# Patient Record
Sex: MALE | Race: WHITE | NOT HISPANIC OR LATINO | Employment: OTHER | URBAN - METROPOLITAN AREA
[De-identification: names, ages, dates, MRNs, and addresses within clinical notes are randomized per-mention and may not be internally consistent; named-entity substitution may affect disease eponyms.]

---

## 2019-07-09 ENCOUNTER — TRANSCRIBE ORDERS (OUTPATIENT)
Dept: ADMINISTRATIVE | Facility: HOSPITAL | Age: 74
End: 2019-07-09

## 2019-07-09 DIAGNOSIS — M54.16 LUMBAR RADICULOPATHY: Primary | ICD-10-CM

## 2019-07-11 ENCOUNTER — HOSPITAL ENCOUNTER (OUTPATIENT)
Dept: MRI IMAGING | Facility: HOSPITAL | Age: 74
Discharge: HOME | End: 2019-07-11
Admitting: INTERNAL MEDICINE

## 2019-07-11 DIAGNOSIS — M54.16 LUMBAR RADICULOPATHY: ICD-10-CM

## 2019-07-11 PROCEDURE — 72148 MRI LUMBAR SPINE W/O DYE: CPT

## 2020-05-06 ENCOUNTER — TRANSCRIBE ORDERS (OUTPATIENT)
Dept: ADMINISTRATIVE | Facility: HOSPITAL | Age: 75
End: 2020-05-06

## 2020-05-06 DIAGNOSIS — M25.562 LEFT KNEE PAIN, UNSPECIFIED CHRONICITY: ICD-10-CM

## 2020-05-06 DIAGNOSIS — M25.462 KNEE EFFUSION, LEFT: Primary | ICD-10-CM

## 2020-05-18 ENCOUNTER — HOSPITAL ENCOUNTER (OUTPATIENT)
Dept: MRI IMAGING | Facility: HOSPITAL | Age: 75
Discharge: HOME | End: 2020-05-18
Admitting: INTERNAL MEDICINE

## 2020-05-18 DIAGNOSIS — M25.462 KNEE EFFUSION, LEFT: ICD-10-CM

## 2020-05-18 DIAGNOSIS — M25.562 LEFT KNEE PAIN, UNSPECIFIED CHRONICITY: ICD-10-CM

## 2020-05-18 PROCEDURE — 73721 MRI JNT OF LWR EXTRE W/O DYE: CPT

## 2021-01-01 ENCOUNTER — OFFICE VISIT (OUTPATIENT)
Dept: INTERNAL MEDICINE | Facility: CLINIC | Age: 76
End: 2021-01-01

## 2021-01-01 VITALS
SYSTOLIC BLOOD PRESSURE: 132 MMHG | TEMPERATURE: 98.2 F | HEIGHT: 73 IN | WEIGHT: 240.8 LBS | OXYGEN SATURATION: 100 % | BODY MASS INDEX: 31.91 KG/M2 | RESPIRATION RATE: 16 BRPM | HEART RATE: 69 BPM | DIASTOLIC BLOOD PRESSURE: 86 MMHG

## 2021-01-01 DIAGNOSIS — G62.9 PERIPHERAL POLYNEUROPATHY: ICD-10-CM

## 2021-01-01 DIAGNOSIS — M17.0 OSTEOARTHRITIS OF BOTH KNEES, UNSPECIFIED OSTEOARTHRITIS TYPE: Primary | ICD-10-CM

## 2021-01-01 PROCEDURE — 99214 OFFICE O/P EST MOD 30 MIN: CPT | Performed by: INTERNAL MEDICINE

## 2021-01-01 RX ORDER — GABAPENTIN 600 MG/1
TABLET ORAL
Qty: 810 TABLET | Refills: 1 | Status: SHIPPED | OUTPATIENT
Start: 2021-01-01 | End: 2022-01-01 | Stop reason: SDUPTHER

## 2021-03-23 ENCOUNTER — TELEPHONE (OUTPATIENT)
Dept: INTERNAL MEDICINE | Facility: CLINIC | Age: 76
End: 2021-03-23

## 2021-03-23 DIAGNOSIS — G62.9 PERIPHERAL POLYNEUROPATHY: Primary | ICD-10-CM

## 2021-03-23 RX ORDER — GABAPENTIN 600 MG/1
TABLET ORAL
Qty: 810 TABLET | Refills: 1 | Status: SHIPPED | OUTPATIENT
Start: 2021-03-23 | End: 2021-09-24

## 2021-06-21 ENCOUNTER — TELEPHONE (OUTPATIENT)
Dept: INTERNAL MEDICINE | Facility: CLINIC | Age: 76
End: 2021-06-21

## 2021-07-07 ENCOUNTER — OFFICE VISIT (OUTPATIENT)
Dept: INTERNAL MEDICINE | Facility: CLINIC | Age: 76
End: 2021-07-07

## 2021-07-07 VITALS
RESPIRATION RATE: 16 BRPM | TEMPERATURE: 97.5 F | HEART RATE: 63 BPM | BODY MASS INDEX: 31.01 KG/M2 | SYSTOLIC BLOOD PRESSURE: 132 MMHG | DIASTOLIC BLOOD PRESSURE: 72 MMHG | HEIGHT: 73 IN | WEIGHT: 234 LBS | OXYGEN SATURATION: 97 %

## 2021-07-07 DIAGNOSIS — C44.310 FACIAL BASAL CELL CANCER: Primary | ICD-10-CM

## 2021-07-07 PROCEDURE — 99213 OFFICE O/P EST LOW 20 MIN: CPT | Performed by: INTERNAL MEDICINE

## 2021-07-07 NOTE — PROGRESS NOTES
"Chief Complaint  Suspicious Skin Lesion    Subjective          Lionel Whitehead presents to [facility]  History of Present Illness  Nonhealing area on the nose that has been there for months.  He was treated with liquid nitrogen previously and is much smaller than before but is persistent.  Not necessarily painful  Objective   Vital Signs:   /72 (BP Location: Left arm, Patient Position: Sitting, Cuff Size: Large Adult)   Pulse 63   Temp 97.5 °F (36.4 °C) (Temporal)   Resp 16   Ht 185.4 cm (73\")   Wt 106 kg (234 lb)   SpO2 97%   BMI 30.87 kg/m²     Physical Exam   Left nares has a 7 to 8 mm diameter irregular flesh-colored lesion     Result Review :                 Assessment and Plan basal cell cancer clinically.  Referral to Dr. Gordo Ruiz for Mohs surgery.  Follow-up as needed and schedule wellness exam in the next few months  Diagnoses and all orders for this visit:    1. Facial basal cell cancer (Primary)  -     Ambulatory Referral to Dermatology        Follow Up   Return in about 3 months (around 10/7/2021) for Medicare Wellness.  Patient was given instructions and counseling regarding his condition or for health maintenance advice. Please see specific information pulled into the AVS if appropriate.       "

## 2021-09-24 DIAGNOSIS — G62.9 PERIPHERAL POLYNEUROPATHY: ICD-10-CM

## 2021-09-24 RX ORDER — GABAPENTIN 600 MG/1
TABLET ORAL
Qty: 810 TABLET | Refills: 0 | Status: SHIPPED | OUTPATIENT
Start: 2021-09-24 | End: 2021-01-01 | Stop reason: SDUPTHER

## 2021-12-29 NOTE — PROGRESS NOTES
"Chief Complaint  Med Refill (F/U VISIT)    Subjective          Lionel Whitehead presents to [facility]  History of Present Illness  Follow-up for chronic pain management.  No changes in medication.  Denies any sharing of medication or misuse of medication.  Overall pain is pretty well controlled.  Denies any other pain medication use.  Has not gotten any pain medicine from other providers.  Overall things are going well.  He is not quite walking as much.  His knee is doing better but still bothers him somewhat  Objective   Vital Signs:   /86   Pulse 69   Temp 98.2 °F (36.8 °C)   Resp 16   Ht 185.4 cm (73\")   Wt 109 kg (240 lb 12.8 oz)   SpO2 100%   BMI 31.77 kg/m²     Physical Exam  Vitals and nursing note reviewed.   Constitutional:       Appearance: Normal appearance.   HENT:      Head: Normocephalic and atraumatic.   Cardiovascular:      Rate and Rhythm: Normal rate and regular rhythm.   Pulmonary:      Effort: Pulmonary effort is normal.      Breath sounds: Normal breath sounds.   Abdominal:      General: Abdomen is flat.      Palpations: Abdomen is soft.   Musculoskeletal:         General: No swelling or deformity.      Cervical back: Neck supple.      Right lower leg: No edema.      Left lower leg: No edema.   Skin:     General: Skin is warm.      Capillary Refill: Capillary refill takes less than 2 seconds.      Findings: No rash.   Neurological:      General: No focal deficit present.      Mental Status: He is alert and oriented to person, place, and time.      Sensory: Sensory deficit present.   Psychiatric:         Mood and Affect: Mood normal.         Behavior: Behavior normal.        Result Review : Previous notes                Assessment and Plan peripheral neuropathy stable.  He does pretty well overall on the gabapentin.  Obviously it is a pretty large dose but he is been off narcotics for a long time and this works well for " him.  Osteoarthritis of the knees.  He is not interested in a definitive treatment at this point.  He is not interested in any vaccines  Diagnoses and all orders for this visit:    1. Osteoarthritis of both knees, unspecified osteoarthritis type (Primary)    2. Peripheral polyneuropathy  -     gabapentin (NEURONTIN) 600 MG tablet; TAKE TWO TABLETS BY MOUTH THREE TIMES A DAY AND THREE TABLETS AT BEDTIME.  Dispense: 810 tablet; Refill: 1        Follow Up   Return in about 6 months (around 6/27/2022) for Medicare Wellness.  Patient was given instructions and counseling regarding his condition or for health maintenance advice. Please see specific information pulled into the AVS if appropriate.       Answers for HPI/ROS submitted by the patient on 12/25/2021  What is the primary reason for your visit?: Other  Please describe your symptoms.: Prescription refill.  Have you had these symptoms before?: Yes  How long have you been having these symptoms?: Greater than 2 weeks  Please list any medications you are currently taking for this condition.: Gabapentin  Please describe any probable cause for these symptoms. : Age

## 2022-01-01 ENCOUNTER — TELEPHONE (OUTPATIENT)
Dept: INTERNAL MEDICINE | Facility: CLINIC | Age: 77
End: 2022-01-01

## 2022-01-01 ENCOUNTER — TELEPHONE (OUTPATIENT)
Dept: GASTROENTEROLOGY | Facility: CLINIC | Age: 77
End: 2022-01-01

## 2022-01-01 ENCOUNTER — APPOINTMENT (OUTPATIENT)
Dept: GENERAL RADIOLOGY | Facility: HOSPITAL | Age: 77
End: 2022-01-01

## 2022-01-01 ENCOUNTER — ANESTHESIA EVENT (OUTPATIENT)
Dept: PERIOP | Facility: HOSPITAL | Age: 77
End: 2022-01-01

## 2022-01-01 ENCOUNTER — APPOINTMENT (OUTPATIENT)
Dept: CT IMAGING | Facility: HOSPITAL | Age: 77
End: 2022-01-01

## 2022-01-01 ENCOUNTER — APPOINTMENT (OUTPATIENT)
Dept: ULTRASOUND IMAGING | Facility: HOSPITAL | Age: 77
End: 2022-01-01

## 2022-01-01 ENCOUNTER — ANESTHESIA (OUTPATIENT)
Dept: PERIOP | Facility: HOSPITAL | Age: 77
End: 2022-01-01

## 2022-01-01 ENCOUNTER — OFFICE VISIT (OUTPATIENT)
Dept: INTERNAL MEDICINE | Facility: CLINIC | Age: 77
End: 2022-01-01

## 2022-01-01 ENCOUNTER — APPOINTMENT (OUTPATIENT)
Dept: MRI IMAGING | Facility: HOSPITAL | Age: 77
End: 2022-01-01

## 2022-01-01 ENCOUNTER — HOSPITAL ENCOUNTER (INPATIENT)
Facility: HOSPITAL | Age: 77
LOS: 13 days | End: 2022-12-19
Attending: EMERGENCY MEDICINE | Admitting: INTERNAL MEDICINE

## 2022-01-01 VITALS
RESPIRATION RATE: 16 BRPM | SYSTOLIC BLOOD PRESSURE: 132 MMHG | HEART RATE: 92 BPM | DIASTOLIC BLOOD PRESSURE: 62 MMHG | OXYGEN SATURATION: 96 % | HEIGHT: 73 IN | BODY MASS INDEX: 30.62 KG/M2 | TEMPERATURE: 97.3 F | WEIGHT: 231 LBS

## 2022-01-01 VITALS
RESPIRATION RATE: 18 BRPM | OXYGEN SATURATION: 98 % | DIASTOLIC BLOOD PRESSURE: 76 MMHG | TEMPERATURE: 98.6 F | HEART RATE: 84 BPM | HEIGHT: 73 IN | WEIGHT: 247 LBS | SYSTOLIC BLOOD PRESSURE: 134 MMHG | BODY MASS INDEX: 32.74 KG/M2

## 2022-01-01 VITALS
HEIGHT: 73 IN | OXYGEN SATURATION: 97 % | WEIGHT: 220 LBS | TEMPERATURE: 98.8 F | HEART RATE: 87 BPM | RESPIRATION RATE: 18 BRPM | BODY MASS INDEX: 29.16 KG/M2

## 2022-01-01 VITALS
DIASTOLIC BLOOD PRESSURE: 50 MMHG | SYSTOLIC BLOOD PRESSURE: 92 MMHG | WEIGHT: 227.74 LBS | HEART RATE: 44 BPM | HEIGHT: 73 IN | OXYGEN SATURATION: 90 % | TEMPERATURE: 96.8 F | BODY MASS INDEX: 30.18 KG/M2 | RESPIRATION RATE: 26 BRPM

## 2022-01-01 DIAGNOSIS — G89.29 CHRONIC RIGHT SHOULDER PAIN: Primary | ICD-10-CM

## 2022-01-01 DIAGNOSIS — M25.562 CHRONIC PAIN OF LEFT KNEE: ICD-10-CM

## 2022-01-01 DIAGNOSIS — R03.0 ELEVATED BLOOD PRESSURE READING: Primary | ICD-10-CM

## 2022-01-01 DIAGNOSIS — G62.9 PERIPHERAL POLYNEUROPATHY: ICD-10-CM

## 2022-01-01 DIAGNOSIS — G89.29 CHRONIC PAIN OF LEFT KNEE: ICD-10-CM

## 2022-01-01 DIAGNOSIS — M25.511 CHRONIC RIGHT SHOULDER PAIN: Primary | ICD-10-CM

## 2022-01-01 DIAGNOSIS — K92.2 UPPER GI BLEED: Primary | ICD-10-CM

## 2022-01-01 DIAGNOSIS — R73.01 ELEVATED FASTING GLUCOSE: ICD-10-CM

## 2022-01-01 DIAGNOSIS — E78.5 HYPERLIPIDEMIA, UNSPECIFIED HYPERLIPIDEMIA TYPE: ICD-10-CM

## 2022-01-01 DIAGNOSIS — B34.9 VIRAL INFECTION: Primary | ICD-10-CM

## 2022-01-01 LAB
ABO GROUP BLD: NORMAL
ABO GROUP BLD: NORMAL
ALBUMIN FLD-MCNC: <0.2 G/DL
ALBUMIN SERPL-MCNC: 2.5 G/DL (ref 3.5–5.2)
ALBUMIN SERPL-MCNC: 2.6 G/DL (ref 3.5–5.2)
ALBUMIN SERPL-MCNC: 2.6 G/DL (ref 3.5–5.2)
ALBUMIN SERPL-MCNC: 2.7 G/DL (ref 3.5–5.2)
ALBUMIN SERPL-MCNC: 2.8 G/DL (ref 3.5–5.2)
ALBUMIN SERPL-MCNC: 2.9 G/DL (ref 3.5–5.2)
ALBUMIN SERPL-MCNC: 2.9 G/DL (ref 3.5–5.2)
ALBUMIN SERPL-MCNC: 3.1 G/DL (ref 3.5–5.2)
ALBUMIN SERPL-MCNC: 4.4 G/DL (ref 3.7–4.7)
ALBUMIN/GLOB SERPL: 0.8 G/DL
ALBUMIN/GLOB SERPL: 0.9 G/DL
ALBUMIN/GLOB SERPL: 0.9 G/DL
ALBUMIN/GLOB SERPL: 1.1 G/DL
ALBUMIN/GLOB SERPL: 1.2 G/DL
ALBUMIN/GLOB SERPL: 1.3 G/DL
ALBUMIN/GLOB SERPL: 1.8 {RATIO} (ref 1.2–2.2)
ALP SERPL-CCNC: 109 U/L (ref 39–117)
ALP SERPL-CCNC: 116 IU/L (ref 44–121)
ALP SERPL-CCNC: 223 U/L (ref 39–117)
ALP SERPL-CCNC: 251 U/L (ref 39–117)
ALP SERPL-CCNC: 262 U/L (ref 39–117)
ALP SERPL-CCNC: 267 U/L (ref 39–117)
ALP SERPL-CCNC: 275 U/L (ref 39–117)
ALP SERPL-CCNC: 282 U/L (ref 39–117)
ALP SERPL-CCNC: 323 U/L (ref 39–117)
ALP SERPL-CCNC: 360 U/L (ref 39–117)
ALP SERPL-CCNC: 397 U/L (ref 39–117)
ALPHA-FETOPROTEIN: 650 NG/ML (ref 0–8.3)
ALT SERPL W P-5'-P-CCNC: 137 U/L (ref 1–41)
ALT SERPL W P-5'-P-CCNC: 1480 U/L (ref 1–41)
ALT SERPL W P-5'-P-CCNC: 204 U/L (ref 1–41)
ALT SERPL W P-5'-P-CCNC: 25 U/L (ref 1–41)
ALT SERPL W P-5'-P-CCNC: 2610 U/L (ref 1–41)
ALT SERPL W P-5'-P-CCNC: 327 U/L (ref 1–41)
ALT SERPL W P-5'-P-CCNC: 3613 U/L (ref 1–41)
ALT SERPL W P-5'-P-CCNC: 419 U/L (ref 1–41)
ALT SERPL W P-5'-P-CCNC: 592 U/L (ref 1–41)
ALT SERPL W P-5'-P-CCNC: 943 U/L (ref 1–41)
ALT SERPL-CCNC: 18 IU/L (ref 0–44)
AMMONIA BLD-SCNC: 126 UMOL/L (ref 16–60)
AMMONIA BLD-SCNC: 142 UMOL/L (ref 16–60)
AMMONIA BLD-SCNC: 15 UMOL/L (ref 16–60)
AMMONIA BLD-SCNC: 174 UMOL/L (ref 16–60)
AMPHETAMINES UR QL SCN: NEGATIVE NG/ML
ANION GAP SERPL CALCULATED.3IONS-SCNC: 10.7 MMOL/L (ref 5–15)
ANION GAP SERPL CALCULATED.3IONS-SCNC: 10.9 MMOL/L (ref 5–15)
ANION GAP SERPL CALCULATED.3IONS-SCNC: 11.3 MMOL/L (ref 5–15)
ANION GAP SERPL CALCULATED.3IONS-SCNC: 11.3 MMOL/L (ref 5–15)
ANION GAP SERPL CALCULATED.3IONS-SCNC: 11.4 MMOL/L (ref 5–15)
ANION GAP SERPL CALCULATED.3IONS-SCNC: 11.4 MMOL/L (ref 5–15)
ANION GAP SERPL CALCULATED.3IONS-SCNC: 12.3 MMOL/L (ref 5–15)
ANION GAP SERPL CALCULATED.3IONS-SCNC: 12.7 MMOL/L (ref 5–15)
ANION GAP SERPL CALCULATED.3IONS-SCNC: 13 MMOL/L (ref 5–15)
ANION GAP SERPL CALCULATED.3IONS-SCNC: 14 MMOL/L (ref 5–15)
ANION GAP SERPL CALCULATED.3IONS-SCNC: 14.7 MMOL/L (ref 5–15)
ANION GAP SERPL CALCULATED.3IONS-SCNC: 15.6 MMOL/L (ref 5–15)
ANION GAP SERPL CALCULATED.3IONS-SCNC: 16.7 MMOL/L (ref 5–15)
ANION GAP SERPL CALCULATED.3IONS-SCNC: 9.5 MMOL/L (ref 5–15)
ANION GAP SERPL CALCULATED.3IONS-SCNC: 9.6 MMOL/L (ref 5–15)
ANION GAP SERPL CALCULATED.3IONS-SCNC: 9.9 MMOL/L (ref 5–15)
ANISOCYTOSIS BLD QL: NORMAL
APPEARANCE FLD: CLEAR
APTT PPP: 26.3 SECONDS (ref 24.3–38.1)
APTT PPP: 27.4 SECONDS (ref 24.3–38.1)
ARTERIAL PATENCY WRIST A: ABNORMAL
ARTERIAL PATENCY WRIST A: ABNORMAL
AST SERPL-CCNC: 13 IU/L (ref 0–40)
AST SERPL-CCNC: 18 U/L (ref 1–40)
AST SERPL-CCNC: 24 U/L (ref 1–40)
AST SERPL-CCNC: 25 U/L (ref 1–40)
AST SERPL-CCNC: 25 U/L (ref 1–40)
AST SERPL-CCNC: 31 U/L (ref 1–40)
AST SERPL-CCNC: 362 U/L (ref 1–40)
AST SERPL-CCNC: 37 U/L (ref 1–40)
AST SERPL-CCNC: 52 U/L (ref 1–40)
AST SERPL-CCNC: 87 U/L (ref 1–40)
AST SERPL-CCNC: 889 U/L (ref 1–40)
ATMOSPHERIC PRESS: 741 MMHG
ATMOSPHERIC PRESS: 747 MMHG
BACTERIA FLD CULT: NORMAL
BACTERIA SPEC AEROBE CULT: NORMAL
BACTERIA UR QL AUTO: ABNORMAL /HPF
BACTERIA UR QL AUTO: ABNORMAL /HPF
BARBITURATES UR QL SCN: NEGATIVE NG/ML
BASE EXCESS BLDA CALC-SCNC: -2.4 MMOL/L (ref 0–2)
BASE EXCESS BLDA CALC-SCNC: -8.6 MMOL/L (ref 0–2)
BASOPHILS # BLD AUTO: 0 10*3/MM3 (ref 0–0.2)
BASOPHILS # BLD AUTO: 0.01 10*3/MM3 (ref 0–0.2)
BASOPHILS # BLD AUTO: 0.03 10*3/MM3 (ref 0–0.2)
BASOPHILS # BLD AUTO: 0.03 10*3/MM3 (ref 0–0.2)
BASOPHILS # BLD AUTO: 0.04 10*3/MM3 (ref 0–0.2)
BASOPHILS # BLD AUTO: 0.04 10*3/MM3 (ref 0–0.2)
BASOPHILS # BLD AUTO: 0.05 10*3/MM3 (ref 0–0.2)
BASOPHILS NFR BLD AUTO: 0 % (ref 0–1.5)
BASOPHILS NFR BLD AUTO: 0.1 % (ref 0–1.5)
BASOPHILS NFR BLD AUTO: 0.2 % (ref 0–1.5)
BASOPHILS NFR BLD AUTO: 0.3 % (ref 0–1.5)
BASOPHILS NFR BLD AUTO: 0.4 % (ref 0–1.5)
BDY SITE: ABNORMAL
BDY SITE: ABNORMAL
BENZODIAZ UR QL SCN: NEGATIVE NG/ML
BH BB BLOOD EXPIRATION DATE: NORMAL
BH BB BLOOD TYPE BARCODE: 600
BH BB BLOOD TYPE BARCODE: 6200
BH BB BLOOD TYPE BARCODE: 6200
BH BB DISPENSE STATUS: NORMAL
BH BB PRODUCT CODE: NORMAL
BH BB UNIT NUMBER: NORMAL
BILIRUB SERPL-MCNC: 0.4 MG/DL (ref 0–1.2)
BILIRUB SERPL-MCNC: 0.6 MG/DL (ref 0–1.2)
BILIRUB SERPL-MCNC: 1.5 MG/DL (ref 0–1.2)
BILIRUB SERPL-MCNC: 1.6 MG/DL (ref 0–1.2)
BILIRUB SERPL-MCNC: 1.7 MG/DL (ref 0–1.2)
BILIRUB SERPL-MCNC: 1.8 MG/DL (ref 0–1.2)
BILIRUB SERPL-MCNC: 1.8 MG/DL (ref 0–1.2)
BILIRUB SERPL-MCNC: 2.5 MG/DL (ref 0–1.2)
BILIRUB SERPL-MCNC: 2.6 MG/DL (ref 0–1.2)
BILIRUB SERPL-MCNC: 2.7 MG/DL (ref 0–1.2)
BILIRUB SERPL-MCNC: 3.1 MG/DL (ref 0–1.2)
BILIRUB UR QL STRIP: NEGATIVE
BILIRUB UR QL STRIP: NEGATIVE
BLD GP AB SCN SERPL QL: NEGATIVE
BODY TEMPERATURE: 37 C
BODY TEMPERATURE: 37 C
BUN SERPL-MCNC: 43 MG/DL (ref 8–23)
BUN SERPL-MCNC: 43 MG/DL (ref 8–23)
BUN SERPL-MCNC: 45 MG/DL (ref 8–23)
BUN SERPL-MCNC: 47 MG/DL (ref 8–23)
BUN SERPL-MCNC: 47 MG/DL (ref 8–23)
BUN SERPL-MCNC: 48 MG/DL (ref 8–23)
BUN SERPL-MCNC: 48 MG/DL (ref 8–23)
BUN SERPL-MCNC: 53 MG/DL (ref 8–23)
BUN SERPL-MCNC: 53 MG/DL (ref 8–23)
BUN SERPL-MCNC: 60 MG/DL (ref 8–23)
BUN SERPL-MCNC: 65 MG/DL (ref 8–23)
BUN SERPL-MCNC: 68 MG/DL (ref 8–23)
BUN SERPL-MCNC: 74 MG/DL (ref 8–23)
BUN SERPL-MCNC: 8 MG/DL (ref 8–27)
BUN SERPL-MCNC: 82 MG/DL (ref 8–23)
BUN SERPL-MCNC: 85 MG/DL (ref 8–23)
BUN SERPL-MCNC: 89 MG/DL (ref 8–23)
BUN/CREAT SERPL: 38.7 (ref 7–25)
BUN/CREAT SERPL: 39.2 (ref 7–25)
BUN/CREAT SERPL: 39.4 (ref 7–25)
BUN/CREAT SERPL: 41.3 (ref 7–25)
BUN/CREAT SERPL: 42 (ref 7–25)
BUN/CREAT SERPL: 42.5 (ref 7–25)
BUN/CREAT SERPL: 42.9 (ref 7–25)
BUN/CREAT SERPL: 43.8 (ref 7–25)
BUN/CREAT SERPL: 44.3 (ref 7–25)
BUN/CREAT SERPL: 47.1 (ref 7–25)
BUN/CREAT SERPL: 48 (ref 7–25)
BUN/CREAT SERPL: 48 (ref 7–25)
BUN/CREAT SERPL: 48.6 (ref 7–25)
BUN/CREAT SERPL: 54.6 (ref 7–25)
BUN/CREAT SERPL: 56.7 (ref 7–25)
BUN/CREAT SERPL: 59.1 (ref 7–25)
BUN/CREAT SERPL: 8 (ref 10–24)
BZE UR QL SCN: NEGATIVE NG/ML
CALCIUM SERPL-MCNC: 9.5 MG/DL (ref 8.6–10.2)
CALCIUM SPEC-SCNC: 7.7 MG/DL (ref 8.6–10.5)
CALCIUM SPEC-SCNC: 7.9 MG/DL (ref 8.6–10.5)
CALCIUM SPEC-SCNC: 8 MG/DL (ref 8.6–10.5)
CALCIUM SPEC-SCNC: 8 MG/DL (ref 8.6–10.5)
CALCIUM SPEC-SCNC: 8.1 MG/DL (ref 8.6–10.5)
CALCIUM SPEC-SCNC: 8.3 MG/DL (ref 8.6–10.5)
CALCIUM SPEC-SCNC: 8.4 MG/DL (ref 8.6–10.5)
CALCIUM SPEC-SCNC: 8.5 MG/DL (ref 8.6–10.5)
CALCIUM SPEC-SCNC: 8.6 MG/DL (ref 8.6–10.5)
CALCIUM SPEC-SCNC: 8.7 MG/DL (ref 8.6–10.5)
CANNABINOIDS UR QL SCN: NEGATIVE NG/ML
CHLORIDE SERPL-SCNC: 103 MMOL/L (ref 98–107)
CHLORIDE SERPL-SCNC: 105 MMOL/L (ref 96–106)
CHLORIDE SERPL-SCNC: 105 MMOL/L (ref 98–107)
CHLORIDE SERPL-SCNC: 105 MMOL/L (ref 98–107)
CHLORIDE SERPL-SCNC: 106 MMOL/L (ref 98–107)
CHLORIDE SERPL-SCNC: 109 MMOL/L (ref 98–107)
CHLORIDE SERPL-SCNC: 112 MMOL/L (ref 98–107)
CHLORIDE SERPL-SCNC: 113 MMOL/L (ref 98–107)
CHLORIDE SERPL-SCNC: 118 MMOL/L (ref 98–107)
CHLORIDE SERPL-SCNC: 119 MMOL/L (ref 98–107)
CHLORIDE SERPL-SCNC: 120 MMOL/L (ref 98–107)
CHLORIDE SERPL-SCNC: 123 MMOL/L (ref 98–107)
CHLORIDE SERPL-SCNC: 123 MMOL/L (ref 98–107)
CHLORIDE SERPL-SCNC: 124 MMOL/L (ref 98–107)
CHLORIDE SERPL-SCNC: 124 MMOL/L (ref 98–107)
CHLORIDE SERPL-SCNC: 127 MMOL/L (ref 98–107)
CHLORIDE SERPL-SCNC: 127 MMOL/L (ref 98–107)
CHOLEST SERPL-MCNC: 175 MG/DL (ref 100–199)
CHOLEST/HDLC SERPL: 5 RATIO (ref 0–5)
CLARITY UR: CLEAR
CLARITY UR: CLEAR
CO2 SERPL-SCNC: 14.3 MMOL/L (ref 22–29)
CO2 SERPL-SCNC: 15.3 MMOL/L (ref 22–29)
CO2 SERPL-SCNC: 15.4 MMOL/L (ref 22–29)
CO2 SERPL-SCNC: 16.3 MMOL/L (ref 22–29)
CO2 SERPL-SCNC: 17 MMOL/L (ref 22–29)
CO2 SERPL-SCNC: 19 MMOL/L (ref 22–29)
CO2 SERPL-SCNC: 19.1 MMOL/L (ref 22–29)
CO2 SERPL-SCNC: 19.1 MMOL/L (ref 22–29)
CO2 SERPL-SCNC: 19.4 MMOL/L (ref 22–29)
CO2 SERPL-SCNC: 19.6 MMOL/L (ref 22–29)
CO2 SERPL-SCNC: 19.7 MMOL/L (ref 22–29)
CO2 SERPL-SCNC: 20.3 MMOL/L (ref 22–29)
CO2 SERPL-SCNC: 21 MMOL/L (ref 20–29)
CO2 SERPL-SCNC: 21.5 MMOL/L (ref 22–29)
CO2 SERPL-SCNC: 21.6 MMOL/L (ref 22–29)
COLOR FLD: COLORLESS
COLOR UR: ABNORMAL
COLOR UR: YELLOW
CREAT SERPL-MCNC: 0.97 MG/DL (ref 0.76–1.27)
CREAT SERPL-MCNC: 1 MG/DL (ref 0.76–1.27)
CREAT SERPL-MCNC: 1.05 MG/DL (ref 0.76–1.27)
CREAT SERPL-MCNC: 1.09 MG/DL (ref 0.76–1.27)
CREAT SERPL-MCNC: 1.09 MG/DL (ref 0.76–1.27)
CREAT SERPL-MCNC: 1.12 MG/DL (ref 0.76–1.27)
CREAT SERPL-MCNC: 1.12 MG/DL (ref 0.76–1.27)
CREAT SERPL-MCNC: 1.15 MG/DL (ref 0.76–1.27)
CREAT SERPL-MCNC: 1.19 MG/DL (ref 0.76–1.27)
CREAT SERPL-MCNC: 1.2 MG/DL (ref 0.76–1.27)
CREAT SERPL-MCNC: 1.21 MG/DL (ref 0.76–1.27)
CREAT SERPL-MCNC: 1.25 MG/DL (ref 0.76–1.27)
CREAT SERPL-MCNC: 1.37 MG/DL (ref 0.76–1.27)
CREAT SERPL-MCNC: 1.5 MG/DL (ref 0.76–1.27)
CREAT SERPL-MCNC: 1.57 MG/DL (ref 0.76–1.27)
CREAT SERPL-MCNC: 1.83 MG/DL (ref 0.76–1.27)
CREAT SERPL-MCNC: 1.93 MG/DL (ref 0.76–1.27)
CREAT UR-MCNC: 66.8 MG/DL (ref 20–300)
CROSSMATCH INTERPRETATION: NORMAL
CRP SERPL-MCNC: 15.16 MG/DL (ref 0–0.5)
CYTO UR: NORMAL
D-LACTATE SERPL-SCNC: 2.5 MMOL/L (ref 0.5–2)
D-LACTATE SERPL-SCNC: 3.5 MMOL/L (ref 0.5–2)
D-LACTATE SERPL-SCNC: 3.6 MMOL/L (ref 0.5–2)
D-LACTATE SERPL-SCNC: 3.9 MMOL/L (ref 0.5–2)
D-LACTATE SERPL-SCNC: 4.1 MMOL/L (ref 0.5–2)
D-LACTATE SERPL-SCNC: 5.2 MMOL/L (ref 0.5–2)
D-LACTATE SERPL-SCNC: 5.4 MMOL/L (ref 0.5–2)
D-LACTATE SERPL-SCNC: 6.2 MMOL/L (ref 0.5–2)
DEPRECATED RDW RBC AUTO: 54.8 FL (ref 37–54)
DEPRECATED RDW RBC AUTO: 54.8 FL (ref 37–54)
DEPRECATED RDW RBC AUTO: 58.4 FL (ref 37–54)
DEPRECATED RDW RBC AUTO: 65.3 FL (ref 37–54)
DEPRECATED RDW RBC AUTO: 65.6 FL (ref 37–54)
DEPRECATED RDW RBC AUTO: 66.1 FL (ref 37–54)
DEPRECATED RDW RBC AUTO: 66.3 FL (ref 37–54)
DEPRECATED RDW RBC AUTO: 66.4 FL (ref 37–54)
DEPRECATED RDW RBC AUTO: 71.5 FL (ref 37–54)
EGFRCR SERPLBLD CKD-EPI 2021: 35.7 ML/MIN/1.73
EGFRCR SERPLBLD CKD-EPI 2021: 38 ML/MIN/1.73
EGFRCR SERPLBLD CKD-EPI 2021: 45.7 ML/MIN/1.73
EGFRCR SERPLBLD CKD-EPI 2021: 48.2 ML/MIN/1.73
EGFRCR SERPLBLD CKD-EPI 2021: 53.8 ML/MIN/1.73
EGFRCR SERPLBLD CKD-EPI 2021: 60 ML/MIN/1.73
EGFRCR SERPLBLD CKD-EPI 2021: 62.4 ML/MIN/1.73
EGFRCR SERPLBLD CKD-EPI 2021: 63.1 ML/MIN/1.73
EGFRCR SERPLBLD CKD-EPI 2021: 63.7 ML/MIN/1.73
EGFRCR SERPLBLD CKD-EPI 2021: 66.4 ML/MIN/1.73
EGFRCR SERPLBLD CKD-EPI 2021: 68.5 ML/MIN/1.73
EGFRCR SERPLBLD CKD-EPI 2021: 68.5 ML/MIN/1.73
EGFRCR SERPLBLD CKD-EPI 2021: 70.8 ML/MIN/1.73
EGFRCR SERPLBLD CKD-EPI 2021: 70.8 ML/MIN/1.73
EGFRCR SERPLBLD CKD-EPI 2021: 74 ML/MIN/1.73
EGFRCR SERPLBLD CKD-EPI 2021: 78.5 ML/MIN/1.73
EGFRCR SERPLBLD CKD-EPI 2021: 81.4 ML/MIN/1.73
EOSINOPHIL # BLD AUTO: 0 10*3/MM3 (ref 0–0.4)
EOSINOPHIL # BLD AUTO: 0.01 10*3/MM3 (ref 0–0.4)
EOSINOPHIL # BLD AUTO: 0.02 10*3/MM3 (ref 0–0.4)
EOSINOPHIL # BLD AUTO: 0.09 10*3/MM3 (ref 0–0.4)
EOSINOPHIL # BLD AUTO: 0.11 10*3/MM3 (ref 0–0.4)
EOSINOPHIL # BLD AUTO: 0.18 10*3/MM3 (ref 0–0.4)
EOSINOPHIL # BLD AUTO: 0.18 10*3/MM3 (ref 0–0.4)
EOSINOPHIL NFR BLD AUTO: 0 % (ref 0.3–6.2)
EOSINOPHIL NFR BLD AUTO: 0.1 % (ref 0.3–6.2)
EOSINOPHIL NFR BLD AUTO: 0.3 % (ref 0.3–6.2)
EOSINOPHIL NFR BLD AUTO: 0.6 % (ref 0.3–6.2)
EOSINOPHIL NFR BLD AUTO: 1.2 % (ref 0.3–6.2)
EOSINOPHIL NFR BLD AUTO: 1.4 % (ref 0.3–6.2)
EOSINOPHIL NFR BLD AUTO: 1.5 % (ref 0.3–6.2)
ERYTHROCYTE [DISTWIDTH] IN BLOOD BY AUTOMATED COUNT: 15.5 % (ref 12.3–15.4)
ERYTHROCYTE [DISTWIDTH] IN BLOOD BY AUTOMATED COUNT: 15.7 % (ref 12.3–15.4)
ERYTHROCYTE [DISTWIDTH] IN BLOOD BY AUTOMATED COUNT: 16.8 % (ref 12.3–15.4)
ERYTHROCYTE [DISTWIDTH] IN BLOOD BY AUTOMATED COUNT: 17.9 % (ref 12.3–15.4)
ERYTHROCYTE [DISTWIDTH] IN BLOOD BY AUTOMATED COUNT: 17.9 % (ref 12.3–15.4)
ERYTHROCYTE [DISTWIDTH] IN BLOOD BY AUTOMATED COUNT: 18.2 % (ref 12.3–15.4)
ERYTHROCYTE [DISTWIDTH] IN BLOOD BY AUTOMATED COUNT: 18.3 % (ref 12.3–15.4)
ERYTHROCYTE [DISTWIDTH] IN BLOOD BY AUTOMATED COUNT: 18.4 % (ref 12.3–15.4)
ERYTHROCYTE [DISTWIDTH] IN BLOOD BY AUTOMATED COUNT: 18.6 % (ref 12.3–15.4)
EXPIRATION DATE: NORMAL
FLUAV AG NPH QL: NEGATIVE
FLUAV RNA RESP QL NAA+PROBE: NOT DETECTED
FLUBV AG NPH QL: NEGATIVE
FLUBV RNA RESP QL NAA+PROBE: NOT DETECTED
GLOBULIN SER CALC-MCNC: 2.5 G/DL (ref 1.5–4.5)
GLOBULIN UR ELPH-MCNC: 2.1 GM/DL
GLOBULIN UR ELPH-MCNC: 2.1 GM/DL
GLOBULIN UR ELPH-MCNC: 2.2 GM/DL
GLOBULIN UR ELPH-MCNC: 2.3 GM/DL
GLOBULIN UR ELPH-MCNC: 2.3 GM/DL
GLOBULIN UR ELPH-MCNC: 2.4 GM/DL
GLOBULIN UR ELPH-MCNC: 2.5 GM/DL
GLOBULIN UR ELPH-MCNC: 2.8 GM/DL
GLOBULIN UR ELPH-MCNC: 3 GM/DL
GLOBULIN UR ELPH-MCNC: 3.2 GM/DL
GLUCOSE BLDC GLUCOMTR-MCNC: 127 MG/DL (ref 70–130)
GLUCOSE BLDC GLUCOMTR-MCNC: 135 MG/DL (ref 70–130)
GLUCOSE BLDC GLUCOMTR-MCNC: 139 MG/DL (ref 70–130)
GLUCOSE BLDC GLUCOMTR-MCNC: 155 MG/DL (ref 70–130)
GLUCOSE BLDC GLUCOMTR-MCNC: 155 MG/DL (ref 70–130)
GLUCOSE BLDC GLUCOMTR-MCNC: 156 MG/DL (ref 70–130)
GLUCOSE BLDC GLUCOMTR-MCNC: 159 MG/DL (ref 70–130)
GLUCOSE BLDC GLUCOMTR-MCNC: 163 MG/DL (ref 70–130)
GLUCOSE BLDC GLUCOMTR-MCNC: 164 MG/DL (ref 70–130)
GLUCOSE BLDC GLUCOMTR-MCNC: 165 MG/DL (ref 70–130)
GLUCOSE BLDC GLUCOMTR-MCNC: 167 MG/DL (ref 70–130)
GLUCOSE BLDC GLUCOMTR-MCNC: 168 MG/DL (ref 70–130)
GLUCOSE BLDC GLUCOMTR-MCNC: 169 MG/DL (ref 70–130)
GLUCOSE BLDC GLUCOMTR-MCNC: 174 MG/DL (ref 70–130)
GLUCOSE BLDC GLUCOMTR-MCNC: 177 MG/DL (ref 70–130)
GLUCOSE BLDC GLUCOMTR-MCNC: 180 MG/DL (ref 70–130)
GLUCOSE BLDC GLUCOMTR-MCNC: 181 MG/DL (ref 70–130)
GLUCOSE BLDC GLUCOMTR-MCNC: 185 MG/DL (ref 70–130)
GLUCOSE BLDC GLUCOMTR-MCNC: 187 MG/DL (ref 70–130)
GLUCOSE BLDC GLUCOMTR-MCNC: 188 MG/DL (ref 70–130)
GLUCOSE BLDC GLUCOMTR-MCNC: 190 MG/DL (ref 70–130)
GLUCOSE BLDC GLUCOMTR-MCNC: 197 MG/DL (ref 70–130)
GLUCOSE BLDC GLUCOMTR-MCNC: 198 MG/DL (ref 70–130)
GLUCOSE BLDC GLUCOMTR-MCNC: 203 MG/DL (ref 70–130)
GLUCOSE BLDC GLUCOMTR-MCNC: 212 MG/DL (ref 70–130)
GLUCOSE BLDC GLUCOMTR-MCNC: 228 MG/DL (ref 70–130)
GLUCOSE BLDC GLUCOMTR-MCNC: 237 MG/DL (ref 70–130)
GLUCOSE SERPL-MCNC: 116 MG/DL (ref 65–99)
GLUCOSE SERPL-MCNC: 136 MG/DL (ref 65–99)
GLUCOSE SERPL-MCNC: 136 MG/DL (ref 65–99)
GLUCOSE SERPL-MCNC: 137 MG/DL (ref 65–99)
GLUCOSE SERPL-MCNC: 144 MG/DL (ref 65–99)
GLUCOSE SERPL-MCNC: 144 MG/DL (ref 65–99)
GLUCOSE SERPL-MCNC: 145 MG/DL (ref 65–99)
GLUCOSE SERPL-MCNC: 149 MG/DL (ref 65–99)
GLUCOSE SERPL-MCNC: 153 MG/DL (ref 65–99)
GLUCOSE SERPL-MCNC: 155 MG/DL (ref 65–99)
GLUCOSE SERPL-MCNC: 157 MG/DL (ref 65–99)
GLUCOSE SERPL-MCNC: 170 MG/DL (ref 65–99)
GLUCOSE SERPL-MCNC: 171 MG/DL (ref 65–99)
GLUCOSE SERPL-MCNC: 172 MG/DL (ref 65–99)
GLUCOSE SERPL-MCNC: 174 MG/DL (ref 65–99)
GLUCOSE SERPL-MCNC: 187 MG/DL (ref 65–99)
GLUCOSE SERPL-MCNC: 192 MG/DL (ref 65–99)
GLUCOSE UR STRIP-MCNC: NEGATIVE MG/DL
GLUCOSE UR STRIP-MCNC: NEGATIVE MG/DL
GRAM STN SPEC: NORMAL
GRAM STN SPEC: NORMAL
HBA1C MFR BLD: 6.1 % (ref 4.8–5.6)
HCO3 BLDA-SCNC: 15.2 MMOL/L (ref 20–26)
HCO3 BLDA-SCNC: 20.8 MMOL/L (ref 20–26)
HCT VFR BLD AUTO: 20.2 % (ref 37.5–51)
HCT VFR BLD AUTO: 23.6 % (ref 37.5–51)
HCT VFR BLD AUTO: 23.6 % (ref 37.5–51)
HCT VFR BLD AUTO: 25.5 % (ref 37.5–51)
HCT VFR BLD AUTO: 25.6 % (ref 37.5–51)
HCT VFR BLD AUTO: 25.9 % (ref 37.5–51)
HCT VFR BLD AUTO: 26.4 % (ref 37.5–51)
HCT VFR BLD AUTO: 26.4 % (ref 37.5–51)
HCT VFR BLD AUTO: 27.2 % (ref 37.5–51)
HCT VFR BLD AUTO: 27.3 % (ref 37.5–51)
HCT VFR BLD AUTO: 27.3 % (ref 37.5–51)
HCT VFR BLD AUTO: 27.4 % (ref 37.5–51)
HCT VFR BLD AUTO: 27.9 % (ref 37.5–51)
HCT VFR BLD AUTO: 28.1 % (ref 37.5–51)
HCT VFR BLD AUTO: 28.1 % (ref 37.5–51)
HCT VFR BLD AUTO: 29 % (ref 37.5–51)
HCT VFR BLD AUTO: 34.7 % (ref 37.5–51)
HCT VFR BLD AUTO: 35.6 % (ref 37.5–51)
HCT VFR BLD AUTO: 35.9 % (ref 37.5–51)
HDLC SERPL-MCNC: 35 MG/DL
HGB BLD-MCNC: 10.2 G/DL (ref 13–17.7)
HGB BLD-MCNC: 10.4 G/DL (ref 13–17.7)
HGB BLD-MCNC: 10.4 G/DL (ref 13–17.7)
HGB BLD-MCNC: 6.5 G/DL (ref 13–17.7)
HGB BLD-MCNC: 7.7 G/DL (ref 13–17.7)
HGB BLD-MCNC: 7.7 G/DL (ref 13–17.7)
HGB BLD-MCNC: 7.9 G/DL (ref 13–17.7)
HGB BLD-MCNC: 8 G/DL (ref 13–17.7)
HGB BLD-MCNC: 8.2 G/DL (ref 13–17.7)
HGB BLD-MCNC: 8.4 G/DL (ref 13–17.7)
HGB BLD-MCNC: 8.5 G/DL (ref 13–17.7)
HGB BLD-MCNC: 8.5 G/DL (ref 13–17.7)
HGB BLD-MCNC: 8.6 G/DL (ref 13–17.7)
HGB BLD-MCNC: 8.7 G/DL (ref 13–17.7)
HGB BLD-MCNC: 8.8 G/DL (ref 13–17.7)
HGB BLD-MCNC: 9 G/DL (ref 13–17.7)
HGB BLD-MCNC: 9.1 G/DL (ref 13–17.7)
HGB BLD-MCNC: 9.2 G/DL (ref 13–17.7)
HGB BLD-MCNC: 9.2 G/DL (ref 13–17.7)
HGB BLDA-MCNC: 8.2 G/DL (ref 14–18)
HGB BLDA-MCNC: 9.1 G/DL (ref 14–18)
HGB UR QL STRIP.AUTO: ABNORMAL
HGB UR QL STRIP.AUTO: ABNORMAL
HYALINE CASTS UR QL AUTO: ABNORMAL /LPF
HYALINE CASTS UR QL AUTO: ABNORMAL /LPF
IMM GRANULOCYTES # BLD AUTO: 0.1 10*3/MM3 (ref 0–0.05)
IMM GRANULOCYTES # BLD AUTO: 0.1 10*3/MM3 (ref 0–0.05)
IMM GRANULOCYTES # BLD AUTO: 0.15 10*3/MM3 (ref 0–0.05)
IMM GRANULOCYTES # BLD AUTO: 0.2 10*3/MM3 (ref 0–0.05)
IMM GRANULOCYTES # BLD AUTO: 0.24 10*3/MM3 (ref 0–0.05)
IMM GRANULOCYTES # BLD AUTO: 0.27 10*3/MM3 (ref 0–0.05)
IMM GRANULOCYTES # BLD AUTO: 0.35 10*3/MM3 (ref 0–0.05)
IMM GRANULOCYTES # BLD AUTO: 0.36 10*3/MM3 (ref 0–0.05)
IMM GRANULOCYTES # BLD AUTO: 0.49 10*3/MM3 (ref 0–0.05)
IMM GRANULOCYTES NFR BLD AUTO: 1.3 % (ref 0–0.5)
IMM GRANULOCYTES NFR BLD AUTO: 1.3 % (ref 0–0.5)
IMM GRANULOCYTES NFR BLD AUTO: 1.4 % (ref 0–0.5)
IMM GRANULOCYTES NFR BLD AUTO: 1.5 % (ref 0–0.5)
IMM GRANULOCYTES NFR BLD AUTO: 2.1 % (ref 0–0.5)
IMM GRANULOCYTES NFR BLD AUTO: 2.2 % (ref 0–0.5)
IMM GRANULOCYTES NFR BLD AUTO: 2.3 % (ref 0–0.5)
IMM GRANULOCYTES NFR BLD AUTO: 2.8 % (ref 0–0.5)
IMM GRANULOCYTES NFR BLD AUTO: 3.4 % (ref 0–0.5)
INR PPP: 1.34 (ref 0.9–1.1)
INR PPP: 1.68 (ref 0.9–1.1)
INR PPP: 1.77 (ref 0.9–1.1)
INTERNAL CONTROL: NORMAL
KETONES UR QL STRIP: NEGATIVE
KETONES UR QL STRIP: NEGATIVE
LAB AP CASE REPORT: NORMAL
LAB AP CASE REPORT: NORMAL
LAB AP DIAGNOSIS COMMENT: NORMAL
LAB AP SPECIAL STAINS: NORMAL
LABCORP SARS-COV-2, NAA 2 DAY TAT: NORMAL
LABORATORY COMMENT REPORT: NORMAL
LDLC SERPL CALC-MCNC: 121 MG/DL (ref 0–99)
LEUKOCYTE ESTERASE UR QL STRIP.AUTO: ABNORMAL
LEUKOCYTE ESTERASE UR QL STRIP.AUTO: ABNORMAL
LIPASE SERPL-CCNC: 34 U/L (ref 13–60)
LYMPHOCYTES # BLD AUTO: 0.44 10*3/MM3 (ref 0.7–3.1)
LYMPHOCYTES # BLD AUTO: 0.48 10*3/MM3 (ref 0.7–3.1)
LYMPHOCYTES # BLD AUTO: 0.52 10*3/MM3 (ref 0.7–3.1)
LYMPHOCYTES # BLD AUTO: 0.64 10*3/MM3 (ref 0.7–3.1)
LYMPHOCYTES # BLD AUTO: 0.64 10*3/MM3 (ref 0.7–3.1)
LYMPHOCYTES # BLD AUTO: 0.73 10*3/MM3 (ref 0.7–3.1)
LYMPHOCYTES # BLD AUTO: 0.75 10*3/MM3 (ref 0.7–3.1)
LYMPHOCYTES # BLD AUTO: 0.75 10*3/MM3 (ref 0.7–3.1)
LYMPHOCYTES # BLD AUTO: 1.61 10*3/MM3 (ref 0.7–3.1)
LYMPHOCYTES NFR BLD AUTO: 11.3 % (ref 19.6–45.3)
LYMPHOCYTES NFR BLD AUTO: 4.2 % (ref 19.6–45.3)
LYMPHOCYTES NFR BLD AUTO: 4.6 % (ref 19.6–45.3)
LYMPHOCYTES NFR BLD AUTO: 4.9 % (ref 19.6–45.3)
LYMPHOCYTES NFR BLD AUTO: 5.2 % (ref 19.6–45.3)
LYMPHOCYTES NFR BLD AUTO: 5.9 % (ref 19.6–45.3)
LYMPHOCYTES NFR BLD AUTO: 6.4 % (ref 19.6–45.3)
LYMPHOCYTES NFR BLD AUTO: 6.7 % (ref 19.6–45.3)
LYMPHOCYTES NFR BLD AUTO: 6.8 % (ref 19.6–45.3)
LYMPHOCYTES NFR FLD MANUAL: 68 %
Lab: ABNORMAL
Lab: ABNORMAL
Lab: NORMAL
MAGNESIUM SERPL-MCNC: 2 MG/DL (ref 1.6–2.4)
MAGNESIUM SERPL-MCNC: 2.1 MG/DL (ref 1.6–2.4)
MCH RBC QN AUTO: 30.1 PG (ref 26.6–33)
MCH RBC QN AUTO: 30.9 PG (ref 26.6–33)
MCH RBC QN AUTO: 30.9 PG (ref 26.6–33)
MCH RBC QN AUTO: 31 PG (ref 26.6–33)
MCH RBC QN AUTO: 31.1 PG (ref 26.6–33)
MCH RBC QN AUTO: 31.1 PG (ref 26.6–33)
MCH RBC QN AUTO: 31.8 PG (ref 26.6–33)
MCH RBC QN AUTO: 31.9 PG (ref 26.6–33)
MCH RBC QN AUTO: 32.1 PG (ref 26.6–33)
MCHC RBC AUTO-ENTMCNC: 28.4 G/DL (ref 31.5–35.7)
MCHC RBC AUTO-ENTMCNC: 29.2 G/DL (ref 31.5–35.7)
MCHC RBC AUTO-ENTMCNC: 30 G/DL (ref 31.5–35.7)
MCHC RBC AUTO-ENTMCNC: 30.1 G/DL (ref 31.5–35.7)
MCHC RBC AUTO-ENTMCNC: 30.3 G/DL (ref 31.5–35.7)
MCHC RBC AUTO-ENTMCNC: 30.5 G/DL (ref 31.5–35.7)
MCHC RBC AUTO-ENTMCNC: 32.2 G/DL (ref 31.5–35.7)
MCHC RBC AUTO-ENTMCNC: 32.6 G/DL (ref 31.5–35.7)
MCHC RBC AUTO-ENTMCNC: 32.7 G/DL (ref 31.5–35.7)
MCV RBC AUTO: 101.9 FL (ref 79–97)
MCV RBC AUTO: 102.2 FL (ref 79–97)
MCV RBC AUTO: 102.9 FL (ref 79–97)
MCV RBC AUTO: 103 FL (ref 79–97)
MCV RBC AUTO: 103 FL (ref 79–97)
MCV RBC AUTO: 109.1 FL (ref 79–97)
MCV RBC AUTO: 97.5 FL (ref 79–97)
MCV RBC AUTO: 97.9 FL (ref 79–97)
MCV RBC AUTO: 99 FL (ref 79–97)
METHADONE UR QL SCN: NEGATIVE NG/ML
MODALITY: ABNORMAL
MODALITY: ABNORMAL
MONOCYTES # BLD AUTO: 0.55 10*3/MM3 (ref 0.1–0.9)
MONOCYTES # BLD AUTO: 0.65 10*3/MM3 (ref 0.1–0.9)
MONOCYTES # BLD AUTO: 0.78 10*3/MM3 (ref 0.1–0.9)
MONOCYTES # BLD AUTO: 0.8 10*3/MM3 (ref 0.1–0.9)
MONOCYTES # BLD AUTO: 0.97 10*3/MM3 (ref 0.1–0.9)
MONOCYTES # BLD AUTO: 1.05 10*3/MM3 (ref 0.1–0.9)
MONOCYTES # BLD AUTO: 1.07 10*3/MM3 (ref 0.1–0.9)
MONOCYTES # BLD AUTO: 1.07 10*3/MM3 (ref 0.1–0.9)
MONOCYTES # BLD AUTO: 1.23 10*3/MM3 (ref 0.1–0.9)
MONOCYTES NFR BLD AUTO: 10 % (ref 5–12)
MONOCYTES NFR BLD AUTO: 10.1 % (ref 5–12)
MONOCYTES NFR BLD AUTO: 5.8 % (ref 5–12)
MONOCYTES NFR BLD AUTO: 6.3 % (ref 5–12)
MONOCYTES NFR BLD AUTO: 6.5 % (ref 5–12)
MONOCYTES NFR BLD AUTO: 7.3 % (ref 5–12)
MONOCYTES NFR BLD AUTO: 7.7 % (ref 5–12)
MONOCYTES NFR BLD AUTO: 8.4 % (ref 5–12)
MONOCYTES NFR BLD AUTO: 8.6 % (ref 5–12)
MRSA DNA SPEC QL NAA+PROBE: NORMAL
NEUTROPHILS NFR BLD AUTO: 10.44 10*3/MM3 (ref 1.7–7)
NEUTROPHILS NFR BLD AUTO: 10.44 10*3/MM3 (ref 1.7–7)
NEUTROPHILS NFR BLD AUTO: 11.02 10*3/MM3 (ref 1.7–7)
NEUTROPHILS NFR BLD AUTO: 13.26 10*3/MM3 (ref 1.7–7)
NEUTROPHILS NFR BLD AUTO: 13.69 10*3/MM3 (ref 1.7–7)
NEUTROPHILS NFR BLD AUTO: 6.26 10*3/MM3 (ref 1.7–7)
NEUTROPHILS NFR BLD AUTO: 6.4 10*3/MM3 (ref 1.7–7)
NEUTROPHILS NFR BLD AUTO: 7.94 10*3/MM3 (ref 1.7–7)
NEUTROPHILS NFR BLD AUTO: 77.1 % (ref 42.7–76)
NEUTROPHILS NFR BLD AUTO: 8.9 10*3/MM3 (ref 1.7–7)
NEUTROPHILS NFR BLD AUTO: 81.4 % (ref 42.7–76)
NEUTROPHILS NFR BLD AUTO: 81.9 % (ref 42.7–76)
NEUTROPHILS NFR BLD AUTO: 83.3 % (ref 42.7–76)
NEUTROPHILS NFR BLD AUTO: 83.8 % (ref 42.7–76)
NEUTROPHILS NFR BLD AUTO: 84.2 % (ref 42.7–76)
NEUTROPHILS NFR BLD AUTO: 84.3 % (ref 42.7–76)
NEUTROPHILS NFR BLD AUTO: 86 % (ref 42.7–76)
NEUTROPHILS NFR BLD AUTO: 86.1 % (ref 42.7–76)
NEUTROPHILS NFR FLD MANUAL: 32 %
NITRITE UR QL STRIP: NEGATIVE
NITRITE UR QL STRIP: NEGATIVE
NRBC BLD AUTO-RTO: 0 /100 WBC (ref 0–0.2)
NRBC BLD AUTO-RTO: 0 /100 WBC (ref 0–0.2)
NRBC BLD AUTO-RTO: 0.2 /100 WBC (ref 0–0.2)
NRBC BLD AUTO-RTO: 0.3 /100 WBC (ref 0–0.2)
NRBC BLD AUTO-RTO: 0.4 /100 WBC (ref 0–0.2)
NRBC BLD AUTO-RTO: 0.6 /100 WBC (ref 0–0.2)
OPIATES UR QL SCN: NEGATIVE NG/ML
OXYCODONE+OXYMORPHONE UR QL SCN: NEGATIVE NG/ML
PATH REPORT.FINAL DX SPEC: NORMAL
PATH REPORT.FINAL DX SPEC: NORMAL
PATH REPORT.GROSS SPEC: NORMAL
PATH REPORT.GROSS SPEC: NORMAL
PCO2 BLDA: 25.4 MM HG (ref 35–45)
PCO2 BLDA: 29 MM HG (ref 35–45)
PCO2 TEMP ADJ BLD: 25.4 MM HG (ref 35–45)
PCO2 TEMP ADJ BLD: 29 MM HG (ref 35–45)
PCP UR QL: NEGATIVE NG/ML
PH BLDA: 7.39 PH UNITS (ref 7.35–7.45)
PH BLDA: 7.46 PH UNITS (ref 7.35–7.45)
PH UR STRIP.AUTO: 5.5 [PH] (ref 4.5–8)
PH UR STRIP.AUTO: 5.5 [PH] (ref 4.5–8)
PH UR: 6.3 [PH] (ref 4.5–8.9)
PH, TEMP CORRECTED: 7.39 PH UNITS (ref 7.35–7.45)
PH, TEMP CORRECTED: 7.46 PH UNITS (ref 7.35–7.45)
PHOSPHATE SERPL-MCNC: 4.2 MG/DL (ref 2.5–4.5)
PHOSPHATE SERPL-MCNC: 5 MG/DL (ref 2.5–4.5)
PLAT MORPH BLD: NORMAL
PLATELET # BLD AUTO: 129 10*3/MM3 (ref 140–450)
PLATELET # BLD AUTO: 177 10*3/MM3 (ref 140–450)
PLATELET # BLD AUTO: 59 10*3/MM3 (ref 140–450)
PLATELET # BLD AUTO: 60 10*3/MM3 (ref 140–450)
PLATELET # BLD AUTO: 60 10*3/MM3 (ref 140–450)
PLATELET # BLD AUTO: 62 10*3/MM3 (ref 140–450)
PLATELET # BLD AUTO: 74 10*3/MM3 (ref 140–450)
PLATELET # BLD AUTO: 79 10*3/MM3 (ref 140–450)
PLATELET # BLD AUTO: 88 10*3/MM3 (ref 140–450)
PMV BLD AUTO: 11.8 FL (ref 6–12)
PMV BLD AUTO: 12.1 FL (ref 6–12)
PMV BLD AUTO: 12.4 FL (ref 6–12)
PMV BLD AUTO: 12.6 FL (ref 6–12)
PMV BLD AUTO: 12.8 FL (ref 6–12)
PMV BLD AUTO: 12.8 FL (ref 6–12)
PMV BLD AUTO: 13.5 FL (ref 6–12)
PMV BLD AUTO: 14.2 FL (ref 6–12)
PMV BLD AUTO: 14.5 FL (ref 6–12)
PO2 BLDA: 67.5 MM HG (ref 83–108)
PO2 BLDA: 77.6 MM HG (ref 83–108)
PO2 TEMP ADJ BLD: 67.5 MM HG (ref 83–108)
PO2 TEMP ADJ BLD: 77.6 MM HG (ref 83–108)
POTASSIUM SERPL-SCNC: 3.5 MMOL/L (ref 3.5–5.2)
POTASSIUM SERPL-SCNC: 3.7 MMOL/L (ref 3.5–5.2)
POTASSIUM SERPL-SCNC: 3.8 MMOL/L (ref 3.5–5.2)
POTASSIUM SERPL-SCNC: 4 MMOL/L (ref 3.5–5.2)
POTASSIUM SERPL-SCNC: 4 MMOL/L (ref 3.5–5.2)
POTASSIUM SERPL-SCNC: 4.1 MMOL/L (ref 3.5–5.2)
POTASSIUM SERPL-SCNC: 4.2 MMOL/L (ref 3.5–5.2)
POTASSIUM SERPL-SCNC: 4.3 MMOL/L (ref 3.5–5.2)
POTASSIUM SERPL-SCNC: 4.6 MMOL/L (ref 3.5–5.2)
POTASSIUM SERPL-SCNC: 4.6 MMOL/L (ref 3.5–5.2)
POTASSIUM SERPL-SCNC: 4.7 MMOL/L (ref 3.5–5.2)
POTASSIUM SERPL-SCNC: 5 MMOL/L (ref 3.5–5.2)
POTASSIUM SERPL-SCNC: 5.1 MMOL/L (ref 3.5–5.2)
POTASSIUM SERPL-SCNC: 5.2 MMOL/L (ref 3.5–5.2)
POTASSIUM SERPL-SCNC: 5.6 MMOL/L (ref 3.5–5.2)
PROCALCITONIN SERPL-MCNC: 0.2 NG/ML (ref 0–0.25)
PROCALCITONIN SERPL-MCNC: 0.72 NG/ML (ref 0–0.25)
PROCALCITONIN SERPL-MCNC: 0.94 NG/ML (ref 0–0.25)
PROCALCITONIN SERPL-MCNC: 1.88 NG/ML (ref 0–0.25)
PROCALCITONIN SERPL-MCNC: 4.46 NG/ML (ref 0–0.25)
PROCALCITONIN SERPL-MCNC: 4.66 NG/ML (ref 0–0.25)
PROPOXYPH UR QL SCN: NEGATIVE NG/ML
PROT FLD-MCNC: <1 G/DL
PROT SERPL-MCNC: 4.9 G/DL (ref 6–8.5)
PROT SERPL-MCNC: 5 G/DL (ref 6–8.5)
PROT SERPL-MCNC: 5.1 G/DL (ref 6–8.5)
PROT SERPL-MCNC: 5.2 G/DL (ref 6–8.5)
PROT SERPL-MCNC: 5.3 G/DL (ref 6–8.5)
PROT SERPL-MCNC: 5.6 G/DL (ref 6–8.5)
PROT SERPL-MCNC: 5.8 G/DL (ref 6–8.5)
PROT SERPL-MCNC: 5.8 G/DL (ref 6–8.5)
PROT SERPL-MCNC: 6.9 G/DL (ref 6–8.5)
PROT UR QL STRIP: ABNORMAL
PROT UR QL STRIP: ABNORMAL
PROTHROMBIN TIME: 16.7 SECONDS (ref 12.1–15)
PROTHROMBIN TIME: 20.1 SECONDS (ref 12.1–15)
PROTHROMBIN TIME: 20.9 SECONDS (ref 12.1–15)
QT INTERVAL: 305 MS
QT INTERVAL: 351 MS
QT INTERVAL: 354 MS
RBC # BLD AUTO: 2.04 10*6/MM3 (ref 4.14–5.8)
RBC # BLD AUTO: 2.42 10*6/MM3 (ref 4.14–5.8)
RBC # BLD AUTO: 2.59 10*6/MM3 (ref 4.14–5.8)
RBC # BLD AUTO: 2.64 10*6/MM3 (ref 4.14–5.8)
RBC # BLD AUTO: 2.73 10*6/MM3 (ref 4.14–5.8)
RBC # BLD AUTO: 2.87 10*6/MM3 (ref 4.14–5.8)
RBC # BLD AUTO: 3.29 10*6/MM3 (ref 4.14–5.8)
RBC # BLD AUTO: 3.37 10*6/MM3 (ref 4.14–5.8)
RBC # BLD AUTO: 3.46 10*6/MM3 (ref 4.14–5.8)
RBC # FLD AUTO: 0 /MM3
RBC # UR STRIP: ABNORMAL /HPF
RBC # UR STRIP: ABNORMAL /HPF
REF LAB TEST METHOD: ABNORMAL
REF LAB TEST METHOD: ABNORMAL
RH BLD: POSITIVE
RH BLD: POSITIVE
SAO2 % BLDCOA: 93.7 % (ref 94–99)
SAO2 % BLDCOA: 96.3 % (ref 94–99)
SARS-COV-2 RNA RESP QL NAA+PROBE: DETECTED
SARS-COV-2 RNA RESP QL NAA+PROBE: NOT DETECTED
SODIUM SERPL-SCNC: 133 MMOL/L (ref 136–145)
SODIUM SERPL-SCNC: 137 MMOL/L (ref 136–145)
SODIUM SERPL-SCNC: 137 MMOL/L (ref 136–145)
SODIUM SERPL-SCNC: 138 MMOL/L (ref 136–145)
SODIUM SERPL-SCNC: 140 MMOL/L (ref 136–145)
SODIUM SERPL-SCNC: 141 MMOL/L (ref 134–144)
SODIUM SERPL-SCNC: 144 MMOL/L (ref 136–145)
SODIUM SERPL-SCNC: 144 MMOL/L (ref 136–145)
SODIUM SERPL-SCNC: 145 MMOL/L (ref 136–145)
SODIUM SERPL-SCNC: 149 MMOL/L (ref 136–145)
SODIUM SERPL-SCNC: 150 MMOL/L (ref 136–145)
SODIUM SERPL-SCNC: 154 MMOL/L (ref 136–145)
SODIUM SERPL-SCNC: 155 MMOL/L (ref 136–145)
SODIUM SERPL-SCNC: 155 MMOL/L (ref 136–145)
SODIUM SERPL-SCNC: 156 MMOL/L (ref 136–145)
SODIUM SERPL-SCNC: 156 MMOL/L (ref 136–145)
SODIUM SERPL-SCNC: 157 MMOL/L (ref 136–145)
SP GR UR STRIP: 1.02 (ref 1–1.03)
SP GR UR STRIP: 1.02 (ref 1–1.03)
SQUAMOUS #/AREA URNS HPF: ABNORMAL /HPF
SQUAMOUS #/AREA URNS HPF: ABNORMAL /HPF
T&S EXPIRATION DATE: NORMAL
TRIGL SERPL-MCNC: 104 MG/DL (ref 0–149)
TROPONIN T SERPL-MCNC: <0.01 NG/ML (ref 0–0.03)
TSH SERPL DL<=0.05 MIU/L-ACNC: 0.28 UIU/ML (ref 0.27–4.2)
UNIT  ABO: NORMAL
UNIT  RH: NORMAL
UROBILINOGEN UR QL STRIP: ABNORMAL
UROBILINOGEN UR QL STRIP: ABNORMAL
VLDLC SERPL CALC-MCNC: 19 MG/DL (ref 5–40)
WBC # FLD AUTO: 105 /MM3
WBC # UR STRIP: ABNORMAL /HPF
WBC # UR STRIP: ABNORMAL /HPF
WBC MORPH BLD: NORMAL
WBC NRBC COR # BLD: 10.56 10*3/MM3 (ref 3.4–10.8)
WBC NRBC COR # BLD: 12.38 10*3/MM3 (ref 3.4–10.8)
WBC NRBC COR # BLD: 12.81 10*3/MM3 (ref 3.4–10.8)
WBC NRBC COR # BLD: 14.3 10*3/MM3 (ref 3.4–10.8)
WBC NRBC COR # BLD: 15.38 10*3/MM3 (ref 3.4–10.8)
WBC NRBC COR # BLD: 15.92 10*3/MM3 (ref 3.4–10.8)
WBC NRBC COR # BLD: 7.52 10*3/MM3 (ref 3.4–10.8)
WBC NRBC COR # BLD: 7.81 10*3/MM3 (ref 3.4–10.8)
WBC NRBC COR # BLD: 9.47 10*3/MM3 (ref 3.4–10.8)

## 2022-01-01 PROCEDURE — 25010000002 OCTREOTIDE PER 25 MCG: Performed by: INTERNAL MEDICINE

## 2022-01-01 PROCEDURE — 99232 SBSQ HOSP IP/OBS MODERATE 35: CPT | Performed by: INTERNAL MEDICINE

## 2022-01-01 PROCEDURE — 93005 ELECTROCARDIOGRAM TRACING: CPT | Performed by: EMERGENCY MEDICINE

## 2022-01-01 PROCEDURE — 74018 RADEX ABDOMEN 1 VIEW: CPT

## 2022-01-01 PROCEDURE — 36600 WITHDRAWAL OF ARTERIAL BLOOD: CPT

## 2022-01-01 PROCEDURE — 25010000002 FUROSEMIDE PER 20 MG: Performed by: HOSPITALIST

## 2022-01-01 PROCEDURE — 25010000002 PIPERACILLIN SOD-TAZOBACTAM PER 1 G: Performed by: INTERNAL MEDICINE

## 2022-01-01 PROCEDURE — 85018 HEMOGLOBIN: CPT | Performed by: INTERNAL MEDICINE

## 2022-01-01 PROCEDURE — 85025 COMPLETE CBC W/AUTO DIFF WBC: CPT | Performed by: HOSPITALIST

## 2022-01-01 PROCEDURE — 86900 BLOOD TYPING SEROLOGIC ABO: CPT

## 2022-01-01 PROCEDURE — 85025 COMPLETE CBC W/AUTO DIFF WBC: CPT | Performed by: INTERNAL MEDICINE

## 2022-01-01 PROCEDURE — 25010000002 PIPERACILLIN SOD-TAZOBACTAM PER 1 G: Performed by: HOSPITALIST

## 2022-01-01 PROCEDURE — 25010000002 PIPERACILLIN SOD-TAZOBACTAM PER 1 G: Performed by: STUDENT IN AN ORGANIZED HEALTH CARE EDUCATION/TRAINING PROGRAM

## 2022-01-01 PROCEDURE — 99223 1ST HOSP IP/OBS HIGH 75: CPT | Performed by: HOSPITALIST

## 2022-01-01 PROCEDURE — 99222 1ST HOSP IP/OBS MODERATE 55: CPT | Performed by: INTERNAL MEDICINE

## 2022-01-01 PROCEDURE — 94799 UNLISTED PULMONARY SVC/PX: CPT

## 2022-01-01 PROCEDURE — 82962 GLUCOSE BLOOD TEST: CPT

## 2022-01-01 PROCEDURE — 87040 BLOOD CULTURE FOR BACTERIA: CPT | Performed by: HOSPITALIST

## 2022-01-01 PROCEDURE — 93010 ELECTROCARDIOGRAM REPORT: CPT | Performed by: INTERNAL MEDICINE

## 2022-01-01 PROCEDURE — 99233 SBSQ HOSP IP/OBS HIGH 50: CPT | Performed by: HOSPITALIST

## 2022-01-01 PROCEDURE — 87804 INFLUENZA ASSAY W/OPTIC: CPT | Performed by: INTERNAL MEDICINE

## 2022-01-01 PROCEDURE — 0 IOPAMIDOL PER 1 ML: Performed by: HOSPITALIST

## 2022-01-01 PROCEDURE — 99233 SBSQ HOSP IP/OBS HIGH 50: CPT | Performed by: INTERNAL MEDICINE

## 2022-01-01 PROCEDURE — 25010000002 OCTREOTIDE PER 25 MCG: Performed by: HOSPITALIST

## 2022-01-01 PROCEDURE — 85610 PROTHROMBIN TIME: CPT | Performed by: STUDENT IN AN ORGANIZED HEALTH CARE EDUCATION/TRAINING PROGRAM

## 2022-01-01 PROCEDURE — 80053 COMPREHEN METABOLIC PANEL: CPT | Performed by: HOSPITALIST

## 2022-01-01 PROCEDURE — 85610 PROTHROMBIN TIME: CPT | Performed by: EMERGENCY MEDICINE

## 2022-01-01 PROCEDURE — 0W9G3ZZ DRAINAGE OF PERITONEAL CAVITY, PERCUTANEOUS APPROACH: ICD-10-PCS | Performed by: RADIOLOGY

## 2022-01-01 PROCEDURE — 99233 SBSQ HOSP IP/OBS HIGH 50: CPT | Performed by: STUDENT IN AN ORGANIZED HEALTH CARE EDUCATION/TRAINING PROGRAM

## 2022-01-01 PROCEDURE — 80053 COMPREHEN METABOLIC PANEL: CPT | Performed by: STUDENT IN AN ORGANIZED HEALTH CARE EDUCATION/TRAINING PROGRAM

## 2022-01-01 PROCEDURE — 76942 ECHO GUIDE FOR BIOPSY: CPT

## 2022-01-01 PROCEDURE — 94761 N-INVAS EAR/PLS OXIMETRY MLT: CPT

## 2022-01-01 PROCEDURE — 73030 X-RAY EXAM OF SHOULDER: CPT

## 2022-01-01 PROCEDURE — 99214 OFFICE O/P EST MOD 30 MIN: CPT | Performed by: INTERNAL MEDICINE

## 2022-01-01 PROCEDURE — 87015 SPECIMEN INFECT AGNT CONCNTJ: CPT | Performed by: INTERNAL MEDICINE

## 2022-01-01 PROCEDURE — 70450 CT HEAD/BRAIN W/O DYE: CPT

## 2022-01-01 PROCEDURE — 85018 HEMOGLOBIN: CPT | Performed by: HOSPITALIST

## 2022-01-01 PROCEDURE — 83690 ASSAY OF LIPASE: CPT | Performed by: EMERGENCY MEDICINE

## 2022-01-01 PROCEDURE — 82140 ASSAY OF AMMONIA: CPT | Performed by: HOSPITALIST

## 2022-01-01 PROCEDURE — 85007 BL SMEAR W/DIFF WBC COUNT: CPT | Performed by: INTERNAL MEDICINE

## 2022-01-01 PROCEDURE — 25010000002 LORAZEPAM PER 2 MG: Performed by: INTERNAL MEDICINE

## 2022-01-01 PROCEDURE — 36410 VNPNXR 3YR/> PHY/QHP DX/THER: CPT

## 2022-01-01 PROCEDURE — 71045 X-RAY EXAM CHEST 1 VIEW: CPT

## 2022-01-01 PROCEDURE — 83605 ASSAY OF LACTIC ACID: CPT | Performed by: HOSPITALIST

## 2022-01-01 PROCEDURE — 25010000002 OCTREOTIDE PER 25 MCG

## 2022-01-01 PROCEDURE — 85730 THROMBOPLASTIN TIME PARTIAL: CPT | Performed by: EMERGENCY MEDICINE

## 2022-01-01 PROCEDURE — 85014 HEMATOCRIT: CPT | Performed by: INTERNAL MEDICINE

## 2022-01-01 PROCEDURE — 82803 BLOOD GASES ANY COMBINATION: CPT

## 2022-01-01 PROCEDURE — 84145 PROCALCITONIN (PCT): CPT | Performed by: HOSPITALIST

## 2022-01-01 PROCEDURE — 86923 COMPATIBILITY TEST ELECTRIC: CPT

## 2022-01-01 PROCEDURE — 80048 BASIC METABOLIC PNL TOTAL CA: CPT | Performed by: HOSPITALIST

## 2022-01-01 PROCEDURE — 84484 ASSAY OF TROPONIN QUANT: CPT | Performed by: EMERGENCY MEDICINE

## 2022-01-01 PROCEDURE — 87205 SMEAR GRAM STAIN: CPT | Performed by: INTERNAL MEDICINE

## 2022-01-01 PROCEDURE — 25010000002 MORPHINE PER 10 MG: Performed by: INTERNAL MEDICINE

## 2022-01-01 PROCEDURE — 25010000002 HYDROMORPHONE PER 4 MG: Performed by: STUDENT IN AN ORGANIZED HEALTH CARE EDUCATION/TRAINING PROGRAM

## 2022-01-01 PROCEDURE — 43244 EGD VARICES LIGATION: CPT | Performed by: INTERNAL MEDICINE

## 2022-01-01 PROCEDURE — 84100 ASSAY OF PHOSPHORUS: CPT | Performed by: HOSPITALIST

## 2022-01-01 PROCEDURE — 84443 ASSAY THYROID STIM HORMONE: CPT | Performed by: HOSPITALIST

## 2022-01-01 PROCEDURE — 76700 US EXAM ABDOM COMPLETE: CPT

## 2022-01-01 PROCEDURE — 85025 COMPLETE CBC W/AUTO DIFF WBC: CPT | Performed by: STUDENT IN AN ORGANIZED HEALTH CARE EDUCATION/TRAINING PROGRAM

## 2022-01-01 PROCEDURE — 87636 SARSCOV2 & INF A&B AMP PRB: CPT | Performed by: EMERGENCY MEDICINE

## 2022-01-01 PROCEDURE — 06L38CZ OCCLUSION OF ESOPHAGEAL VEIN WITH EXTRALUMINAL DEVICE, VIA NATURAL OR ARTIFICIAL OPENING ENDOSCOPIC: ICD-10-PCS | Performed by: INTERNAL MEDICINE

## 2022-01-01 PROCEDURE — 83735 ASSAY OF MAGNESIUM: CPT | Performed by: STUDENT IN AN ORGANIZED HEALTH CARE EDUCATION/TRAINING PROGRAM

## 2022-01-01 PROCEDURE — 87070 CULTURE OTHR SPECIMN AEROBIC: CPT | Performed by: INTERNAL MEDICINE

## 2022-01-01 PROCEDURE — 86901 BLOOD TYPING SEROLOGIC RH(D): CPT

## 2022-01-01 PROCEDURE — 86850 RBC ANTIBODY SCREEN: CPT | Performed by: EMERGENCY MEDICINE

## 2022-01-01 PROCEDURE — P9016 RBC LEUKOCYTES REDUCED: HCPCS

## 2022-01-01 PROCEDURE — 0DH673Z INSERTION OF INFUSION DEVICE INTO STOMACH, VIA NATURAL OR ARTIFICIAL OPENING: ICD-10-PCS | Performed by: INTERNAL MEDICINE

## 2022-01-01 PROCEDURE — 84157 ASSAY OF PROTEIN OTHER: CPT | Performed by: INTERNAL MEDICINE

## 2022-01-01 PROCEDURE — 0 LIDOCAINE 1 % SOLUTION: Performed by: HOSPITALIST

## 2022-01-01 PROCEDURE — 82140 ASSAY OF AMMONIA: CPT | Performed by: STUDENT IN AN ORGANIZED HEALTH CARE EDUCATION/TRAINING PROGRAM

## 2022-01-01 PROCEDURE — 92610 EVALUATE SWALLOWING FUNCTION: CPT

## 2022-01-01 PROCEDURE — 25010000002 ONDANSETRON PER 1 MG: Performed by: EMERGENCY MEDICINE

## 2022-01-01 PROCEDURE — 85014 HEMATOCRIT: CPT | Performed by: HOSPITALIST

## 2022-01-01 PROCEDURE — 36430 TRANSFUSION BLD/BLD COMPNT: CPT

## 2022-01-01 PROCEDURE — 25010000002 HALOPERIDOL LACTATE PER 5 MG: Performed by: PSYCHIATRY & NEUROLOGY

## 2022-01-01 PROCEDURE — 83605 ASSAY OF LACTIC ACID: CPT | Performed by: EMERGENCY MEDICINE

## 2022-01-01 PROCEDURE — 25010000002 FUROSEMIDE PER 20 MG: Performed by: INTERNAL MEDICINE

## 2022-01-01 PROCEDURE — 88313 SPECIAL STAINS GROUP 2: CPT | Performed by: INTERNAL MEDICINE

## 2022-01-01 PROCEDURE — 81001 URINALYSIS AUTO W/SCOPE: CPT | Performed by: STUDENT IN AN ORGANIZED HEALTH CARE EDUCATION/TRAINING PROGRAM

## 2022-01-01 PROCEDURE — 93005 ELECTROCARDIOGRAM TRACING: CPT | Performed by: HOSPITALIST

## 2022-01-01 PROCEDURE — C1751 CATH, INF, PER/CENT/MIDLINE: HCPCS

## 2022-01-01 PROCEDURE — 89051 BODY FLUID CELL COUNT: CPT | Performed by: INTERNAL MEDICINE

## 2022-01-01 PROCEDURE — 25010000002 VANCOMYCIN 1 G RECONSTITUTED SOLUTION 1 EACH VIAL: Performed by: STUDENT IN AN ORGANIZED HEALTH CARE EDUCATION/TRAINING PROGRAM

## 2022-01-01 PROCEDURE — 87086 URINE CULTURE/COLONY COUNT: CPT | Performed by: HOSPITALIST

## 2022-01-01 PROCEDURE — 25010000002 PROPOFOL 200 MG/20ML EMULSION: Performed by: NURSE ANESTHETIST, CERTIFIED REGISTERED

## 2022-01-01 PROCEDURE — 70551 MRI BRAIN STEM W/O DYE: CPT

## 2022-01-01 PROCEDURE — 83735 ASSAY OF MAGNESIUM: CPT | Performed by: HOSPITALIST

## 2022-01-01 PROCEDURE — 88305 TISSUE EXAM BY PATHOLOGIST: CPT | Performed by: INTERNAL MEDICINE

## 2022-01-01 PROCEDURE — 84145 PROCALCITONIN (PCT): CPT | Performed by: EMERGENCY MEDICINE

## 2022-01-01 PROCEDURE — 99222 1ST HOSP IP/OBS MODERATE 55: CPT | Performed by: PSYCHIATRY & NEUROLOGY

## 2022-01-01 PROCEDURE — 86900 BLOOD TYPING SEROLOGIC ABO: CPT | Performed by: EMERGENCY MEDICINE

## 2022-01-01 PROCEDURE — 99285 EMERGENCY DEPT VISIT HI MDM: CPT

## 2022-01-01 PROCEDURE — 82105 ALPHA-FETOPROTEIN SERUM: CPT | Performed by: INTERNAL MEDICINE

## 2022-01-01 PROCEDURE — 0FB03ZX EXCISION OF LIVER, PERCUTANEOUS APPROACH, DIAGNOSTIC: ICD-10-PCS | Performed by: RADIOLOGY

## 2022-01-01 PROCEDURE — 87641 MR-STAPH DNA AMP PROBE: CPT | Performed by: STUDENT IN AN ORGANIZED HEALTH CARE EDUCATION/TRAINING PROGRAM

## 2022-01-01 PROCEDURE — 93005 ELECTROCARDIOGRAM TRACING: CPT | Performed by: STUDENT IN AN ORGANIZED HEALTH CARE EDUCATION/TRAINING PROGRAM

## 2022-01-01 PROCEDURE — 81001 URINALYSIS AUTO W/SCOPE: CPT | Performed by: HOSPITALIST

## 2022-01-01 PROCEDURE — 74170 CT ABD WO CNTRST FLWD CNTRST: CPT

## 2022-01-01 PROCEDURE — 82042 OTHER SOURCE ALBUMIN QUAN EA: CPT | Performed by: INTERNAL MEDICINE

## 2022-01-01 PROCEDURE — 88307 TISSUE EXAM BY PATHOLOGIST: CPT | Performed by: INTERNAL MEDICINE

## 2022-01-01 PROCEDURE — 20610 DRAIN/INJ JOINT/BURSA W/O US: CPT | Performed by: INTERNAL MEDICINE

## 2022-01-01 PROCEDURE — 84145 PROCALCITONIN (PCT): CPT | Performed by: INTERNAL MEDICINE

## 2022-01-01 PROCEDURE — 80053 COMPREHEN METABOLIC PANEL: CPT | Performed by: EMERGENCY MEDICINE

## 2022-01-01 PROCEDURE — 88112 CYTOPATH CELL ENHANCE TECH: CPT | Performed by: INTERNAL MEDICINE

## 2022-01-01 PROCEDURE — 84145 PROCALCITONIN (PCT): CPT | Performed by: STUDENT IN AN ORGANIZED HEALTH CARE EDUCATION/TRAINING PROGRAM

## 2022-01-01 PROCEDURE — 86140 C-REACTIVE PROTEIN: CPT | Performed by: STUDENT IN AN ORGANIZED HEALTH CARE EDUCATION/TRAINING PROGRAM

## 2022-01-01 PROCEDURE — 92526 ORAL FUNCTION THERAPY: CPT

## 2022-01-01 PROCEDURE — 86901 BLOOD TYPING SEROLOGIC RH(D): CPT | Performed by: EMERGENCY MEDICINE

## 2022-01-01 PROCEDURE — 85730 THROMBOPLASTIN TIME PARTIAL: CPT | Performed by: STUDENT IN AN ORGANIZED HEALTH CARE EDUCATION/TRAINING PROGRAM

## 2022-01-01 PROCEDURE — 85025 COMPLETE CBC W/AUTO DIFF WBC: CPT | Performed by: EMERGENCY MEDICINE

## 2022-01-01 PROCEDURE — 99213 OFFICE O/P EST LOW 20 MIN: CPT | Performed by: INTERNAL MEDICINE

## 2022-01-01 PROCEDURE — 93010 ELECTROCARDIOGRAM REPORT: CPT | Performed by: STUDENT IN AN ORGANIZED HEALTH CARE EDUCATION/TRAINING PROGRAM

## 2022-01-01 PROCEDURE — 82140 ASSAY OF AMMONIA: CPT | Performed by: INTERNAL MEDICINE

## 2022-01-01 PROCEDURE — 85610 PROTHROMBIN TIME: CPT | Performed by: INTERNAL MEDICINE

## 2022-01-01 PROCEDURE — 25010000002 OCTREOTIDE PER 25 MCG: Performed by: EMERGENCY MEDICINE

## 2022-01-01 RX ORDER — MAGNESIUM HYDROXIDE 1200 MG/15ML
LIQUID ORAL AS NEEDED
Status: DISCONTINUED | OUTPATIENT
Start: 2022-01-01 | End: 2022-01-01 | Stop reason: HOSPADM

## 2022-01-01 RX ORDER — SODIUM CHLORIDE 9 MG/ML
40 INJECTION, SOLUTION INTRAVENOUS AS NEEDED
Status: DISCONTINUED | OUTPATIENT
Start: 2022-01-01 | End: 2022-01-01 | Stop reason: HOSPADM

## 2022-01-01 RX ORDER — GABAPENTIN 400 MG/1
400 CAPSULE ORAL EVERY 8 HOURS SCHEDULED
Status: DISCONTINUED | OUTPATIENT
Start: 2022-01-01 | End: 2022-01-01 | Stop reason: SDUPTHER

## 2022-01-01 RX ORDER — SODIUM CHLORIDE 0.9 % (FLUSH) 0.9 %
10 SYRINGE (ML) INJECTION EVERY 12 HOURS SCHEDULED
Status: DISCONTINUED | OUTPATIENT
Start: 2022-01-01 | End: 2022-01-01 | Stop reason: HOSPADM

## 2022-01-01 RX ORDER — LORAZEPAM 2 MG/ML
1 INJECTION INTRAMUSCULAR EVERY 6 HOURS
Status: DISCONTINUED | OUTPATIENT
Start: 2022-01-01 | End: 2022-01-01 | Stop reason: HOSPADM

## 2022-01-01 RX ORDER — LIDOCAINE HYDROCHLORIDE 10 MG/ML
INJECTION, SOLUTION EPIDURAL; INFILTRATION; INTRACAUDAL; PERINEURAL AS NEEDED
Status: DISCONTINUED | OUTPATIENT
Start: 2022-01-01 | End: 2022-01-01 | Stop reason: SURG

## 2022-01-01 RX ORDER — LABETALOL HYDROCHLORIDE 5 MG/ML
10 INJECTION, SOLUTION INTRAVENOUS
Status: DISCONTINUED | OUTPATIENT
Start: 2022-01-01 | End: 2022-01-01 | Stop reason: HOSPADM

## 2022-01-01 RX ORDER — POTASSIUM CHLORIDE 20 MEQ/1
40 TABLET, EXTENDED RELEASE ORAL AS NEEDED
Status: DISCONTINUED | OUTPATIENT
Start: 2022-01-01 | End: 2022-01-01

## 2022-01-01 RX ORDER — AMOXICILLIN AND CLAVULANATE POTASSIUM 875; 125 MG/1; MG/1
1 TABLET, FILM COATED ORAL EVERY 12 HOURS SCHEDULED
Status: COMPLETED | OUTPATIENT
Start: 2022-01-01 | End: 2022-01-01

## 2022-01-01 RX ORDER — SODIUM CHLORIDE 0.9 % (FLUSH) 0.9 %
10 SYRINGE (ML) INJECTION AS NEEDED
Status: DISCONTINUED | OUTPATIENT
Start: 2022-01-01 | End: 2022-01-01 | Stop reason: HOSPADM

## 2022-01-01 RX ORDER — SODIUM CHLORIDE, SODIUM LACTATE, POTASSIUM CHLORIDE, CALCIUM CHLORIDE 600; 310; 30; 20 MG/100ML; MG/100ML; MG/100ML; MG/100ML
9 INJECTION, SOLUTION INTRAVENOUS CONTINUOUS PRN
Status: DISCONTINUED | OUTPATIENT
Start: 2022-01-01 | End: 2022-01-01

## 2022-01-01 RX ORDER — MORPHINE SULFATE 2 MG/ML
1 INJECTION, SOLUTION INTRAMUSCULAR; INTRAVENOUS
Status: DISCONTINUED | OUTPATIENT
Start: 2022-01-01 | End: 2022-01-01 | Stop reason: HOSPADM

## 2022-01-01 RX ORDER — MIDAZOLAM HYDROCHLORIDE 1 MG/ML
2 INJECTION INTRAMUSCULAR; INTRAVENOUS
Status: DISCONTINUED | OUTPATIENT
Start: 2022-01-01 | End: 2022-01-01

## 2022-01-01 RX ORDER — GABAPENTIN 300 MG/1
600 CAPSULE ORAL 3 TIMES DAILY
Status: DISCONTINUED | OUTPATIENT
Start: 2022-01-01 | End: 2022-01-01

## 2022-01-01 RX ORDER — TRIAMCINOLONE ACETONIDE 40 MG/ML
20 INJECTION, SUSPENSION INTRA-ARTICULAR; INTRAMUSCULAR
Status: COMPLETED | OUTPATIENT
Start: 2022-01-01 | End: 2022-01-01

## 2022-01-01 RX ORDER — LIDOCAINE HYDROCHLORIDE 10 MG/ML
0.5 INJECTION, SOLUTION EPIDURAL; INFILTRATION; INTRACAUDAL; PERINEURAL ONCE AS NEEDED
Status: DISCONTINUED | OUTPATIENT
Start: 2022-01-01 | End: 2022-01-01 | Stop reason: HOSPADM

## 2022-01-01 RX ORDER — LORAZEPAM 1 MG/1
1 TABLET ORAL
Status: DISCONTINUED | OUTPATIENT
Start: 2022-01-01 | End: 2022-01-01

## 2022-01-01 RX ORDER — LACTULOSE 20 G/30ML
20 SOLUTION ORAL EVERY 4 HOURS
Status: DISCONTINUED | OUTPATIENT
Start: 2022-01-01 | End: 2022-01-01

## 2022-01-01 RX ORDER — MIDAZOLAM HYDROCHLORIDE 1 MG/ML
1 INJECTION INTRAMUSCULAR; INTRAVENOUS
Status: DISCONTINUED | OUTPATIENT
Start: 2022-01-01 | End: 2022-01-01

## 2022-01-01 RX ORDER — PROPOFOL 10 MG/ML
INJECTION, EMULSION INTRAVENOUS AS NEEDED
Status: DISCONTINUED | OUTPATIENT
Start: 2022-01-01 | End: 2022-01-01 | Stop reason: SURG

## 2022-01-01 RX ORDER — LIDOCAINE 50 MG/G
1 PATCH TOPICAL ONCE
Status: COMPLETED | OUTPATIENT
Start: 2022-01-01 | End: 2022-01-01

## 2022-01-01 RX ORDER — FUROSEMIDE 10 MG/ML
40 INJECTION INTRAMUSCULAR; INTRAVENOUS
Status: DISCONTINUED | OUTPATIENT
Start: 2022-01-01 | End: 2022-01-01 | Stop reason: HOSPADM

## 2022-01-01 RX ORDER — DEXTROSE MONOHYDRATE 50 MG/ML
125 INJECTION, SOLUTION INTRAVENOUS CONTINUOUS
Status: DISCONTINUED | OUTPATIENT
Start: 2022-01-01 | End: 2022-01-01

## 2022-01-01 RX ORDER — OCTREOTIDE ACETATE 500 UG/ML
INJECTION, SOLUTION INTRAVENOUS; SUBCUTANEOUS
Status: COMPLETED
Start: 2022-01-01 | End: 2022-01-01

## 2022-01-01 RX ORDER — LORAZEPAM 2 MG/ML
1 INJECTION INTRAMUSCULAR ONCE
Status: COMPLETED | OUTPATIENT
Start: 2022-01-01 | End: 2022-01-01

## 2022-01-01 RX ORDER — TRIAMCINOLONE ACETONIDE 40 MG/ML
40 INJECTION, SUSPENSION INTRA-ARTICULAR; INTRAMUSCULAR
Status: COMPLETED | OUTPATIENT
Start: 2022-01-01 | End: 2022-01-01

## 2022-01-01 RX ORDER — IBUPROFEN 200 MG
1 TABLET ORAL DAILY
COMMUNITY

## 2022-01-01 RX ORDER — PIPERACILLIN SODIUM, TAZOBACTAM SODIUM 4; .5 G/20ML; G/20ML
INJECTION, POWDER, LYOPHILIZED, FOR SOLUTION INTRAVENOUS
Status: DISPENSED
Start: 2022-01-01 | End: 2022-01-01

## 2022-01-01 RX ORDER — LIDOCAINE HYDROCHLORIDE 10 MG/ML
5 INJECTION, SOLUTION INFILTRATION; PERINEURAL ONCE
Status: COMPLETED | OUTPATIENT
Start: 2022-01-01 | End: 2022-01-01

## 2022-01-01 RX ORDER — SODIUM CHLORIDE 9 MG/ML
INJECTION, SOLUTION INTRAVENOUS
Status: COMPLETED
Start: 2022-01-01 | End: 2022-01-01

## 2022-01-01 RX ORDER — PANTOPRAZOLE SODIUM 40 MG/10ML
INJECTION, POWDER, LYOPHILIZED, FOR SOLUTION INTRAVENOUS
Status: DISPENSED
Start: 2022-01-01 | End: 2022-01-01

## 2022-01-01 RX ORDER — POTASSIUM CHLORIDE 20 MEQ/1
40 TABLET, EXTENDED RELEASE ORAL AS NEEDED
Status: DISCONTINUED | OUTPATIENT
Start: 2022-01-01 | End: 2022-01-01 | Stop reason: HOSPADM

## 2022-01-01 RX ORDER — SODIUM CHLORIDE 9 MG/ML
INJECTION, SOLUTION INTRAVENOUS
Status: DISPENSED
Start: 2022-01-01 | End: 2022-01-01

## 2022-01-01 RX ORDER — POTASSIUM CHLORIDE 1.5 G/1.77G
40 POWDER, FOR SOLUTION ORAL AS NEEDED
Status: DISCONTINUED | OUTPATIENT
Start: 2022-01-01 | End: 2022-01-01

## 2022-01-01 RX ORDER — LABETALOL HYDROCHLORIDE 5 MG/ML
10 INJECTION, SOLUTION INTRAVENOUS
Status: DISCONTINUED | OUTPATIENT
Start: 2022-01-01 | End: 2022-01-01

## 2022-01-01 RX ORDER — GLYCOPYRROLATE 0.2 MG/ML
0.1 INJECTION INTRAMUSCULAR; INTRAVENOUS 2 TIMES DAILY
Status: DISCONTINUED | OUTPATIENT
Start: 2022-01-01 | End: 2022-01-01 | Stop reason: HOSPADM

## 2022-01-01 RX ORDER — ONDANSETRON 2 MG/ML
4 INJECTION INTRAMUSCULAR; INTRAVENOUS ONCE
Status: COMPLETED | OUTPATIENT
Start: 2022-01-01 | End: 2022-01-01

## 2022-01-01 RX ORDER — LACTULOSE 20 G/30ML
20 SOLUTION ORAL 3 TIMES DAILY
Status: DISCONTINUED | OUTPATIENT
Start: 2022-01-01 | End: 2022-01-01

## 2022-01-01 RX ORDER — PANTOPRAZOLE SODIUM 40 MG/10ML
40 INJECTION, POWDER, LYOPHILIZED, FOR SOLUTION INTRAVENOUS
Status: DISCONTINUED | OUTPATIENT
Start: 2022-01-01 | End: 2022-01-01 | Stop reason: HOSPADM

## 2022-01-01 RX ORDER — OCTREOTIDE ACETATE 50 UG/ML
INJECTION, SOLUTION INTRAVENOUS; SUBCUTANEOUS
Status: DISPENSED
Start: 2022-01-01 | End: 2022-01-01

## 2022-01-01 RX ORDER — GABAPENTIN 400 MG/1
400 CAPSULE ORAL ONCE
Status: COMPLETED | OUTPATIENT
Start: 2022-01-01 | End: 2022-01-01

## 2022-01-01 RX ORDER — POTASSIUM CHLORIDE 7.45 MG/ML
10 INJECTION INTRAVENOUS
Status: DISCONTINUED | OUTPATIENT
Start: 2022-01-01 | End: 2022-01-01 | Stop reason: HOSPADM

## 2022-01-01 RX ORDER — MELOXICAM 15 MG/1
15 TABLET ORAL DAILY PRN
Qty: 30 TABLET | Refills: 2 | Status: ON HOLD | OUTPATIENT
Start: 2022-01-01 | End: 2022-01-01

## 2022-01-01 RX ORDER — LACTULOSE 20 G/30ML
20 SOLUTION ORAL
Status: DISCONTINUED | OUTPATIENT
Start: 2022-01-01 | End: 2022-01-01

## 2022-01-01 RX ORDER — OCTREOTIDE ACETATE 50 UG/ML
50 INJECTION, SOLUTION INTRAVENOUS; SUBCUTANEOUS ONCE
Status: COMPLETED | OUTPATIENT
Start: 2022-01-01 | End: 2022-01-01

## 2022-01-01 RX ORDER — POTASSIUM CHLORIDE 1.5 G/1.77G
POWDER, FOR SOLUTION ORAL
Status: COMPLETED
Start: 2022-01-01 | End: 2022-01-01

## 2022-01-01 RX ORDER — LORAZEPAM 0.5 MG/1
0.5 TABLET ORAL
Status: DISCONTINUED | OUTPATIENT
Start: 2022-01-01 | End: 2022-01-01

## 2022-01-01 RX ORDER — HALOPERIDOL 5 MG/ML
2 INJECTION INTRAMUSCULAR ONCE
Status: COMPLETED | OUTPATIENT
Start: 2022-01-01 | End: 2022-01-01

## 2022-01-01 RX ORDER — ACETAMINOPHEN 325 MG/1
650 TABLET ORAL EVERY 6 HOURS PRN
Status: DISCONTINUED | OUTPATIENT
Start: 2022-01-01 | End: 2022-01-01

## 2022-01-01 RX ORDER — SODIUM CHLORIDE 0.9 % (FLUSH) 0.9 %
20 SYRINGE (ML) INJECTION AS NEEDED
Status: DISCONTINUED | OUTPATIENT
Start: 2022-01-01 | End: 2022-01-01 | Stop reason: HOSPADM

## 2022-01-01 RX ORDER — POTASSIUM CHLORIDE 1.5 G/1.77G
40 POWDER, FOR SOLUTION ORAL AS NEEDED
Status: DISCONTINUED | OUTPATIENT
Start: 2022-01-01 | End: 2022-01-01 | Stop reason: HOSPADM

## 2022-01-01 RX ORDER — PANTOPRAZOLE SODIUM 40 MG/10ML
40 INJECTION, POWDER, LYOPHILIZED, FOR SOLUTION INTRAVENOUS ONCE
Status: COMPLETED | OUTPATIENT
Start: 2022-01-01 | End: 2022-01-01

## 2022-01-01 RX ORDER — HYDROMORPHONE HCL 110MG/55ML
0.5 PATIENT CONTROLLED ANALGESIA SYRINGE INTRAVENOUS ONCE
Status: COMPLETED | OUTPATIENT
Start: 2022-01-01 | End: 2022-01-01

## 2022-01-01 RX ORDER — SODIUM CHLORIDE, SODIUM LACTATE, POTASSIUM CHLORIDE, CALCIUM CHLORIDE 600; 310; 30; 20 MG/100ML; MG/100ML; MG/100ML; MG/100ML
100 INJECTION, SOLUTION INTRAVENOUS CONTINUOUS
Status: DISCONTINUED | OUTPATIENT
Start: 2022-01-01 | End: 2022-01-01

## 2022-01-01 RX ORDER — GABAPENTIN 100 MG/1
200 CAPSULE ORAL ONCE
Status: COMPLETED | OUTPATIENT
Start: 2022-01-01 | End: 2022-01-01

## 2022-01-01 RX ORDER — L.ACID,PARA/B.BIFIDUM/S.THERM 8B CELL
1 CAPSULE ORAL DAILY
Status: DISCONTINUED | OUTPATIENT
Start: 2022-01-01 | End: 2022-01-01 | Stop reason: HOSPADM

## 2022-01-01 RX ORDER — MULTIPLE VITAMINS W/ MINERALS TAB 9MG-400MCG
1 TAB ORAL DAILY
COMMUNITY

## 2022-01-01 RX ORDER — DEXTROSE MONOHYDRATE 50 MG/ML
75 INJECTION, SOLUTION INTRAVENOUS CONTINUOUS
Status: DISCONTINUED | OUTPATIENT
Start: 2022-01-01 | End: 2022-01-01

## 2022-01-01 RX ORDER — LACTULOSE 20 G/30ML
20 SOLUTION ORAL EVERY 6 HOURS
Status: DISCONTINUED | OUTPATIENT
Start: 2022-01-01 | End: 2022-01-01 | Stop reason: HOSPADM

## 2022-01-01 RX ORDER — SODIUM CHLORIDE, SODIUM LACTATE, POTASSIUM CHLORIDE, CALCIUM CHLORIDE 600; 310; 30; 20 MG/100ML; MG/100ML; MG/100ML; MG/100ML
125 INJECTION, SOLUTION INTRAVENOUS CONTINUOUS
Status: DISCONTINUED | OUTPATIENT
Start: 2022-01-01 | End: 2022-01-01

## 2022-01-01 RX ORDER — POTASSIUM CHLORIDE 7.45 MG/ML
10 INJECTION INTRAVENOUS
Status: DISCONTINUED | OUTPATIENT
Start: 2022-01-01 | End: 2022-01-01

## 2022-01-01 RX ORDER — GABAPENTIN 600 MG/1
TABLET ORAL
Qty: 810 TABLET | Refills: 1 | Status: SHIPPED | OUTPATIENT
Start: 2022-01-01

## 2022-01-01 RX ORDER — OXYCODONE AND ACETAMINOPHEN 10; 325 MG/1; MG/1
1 TABLET ORAL 2 TIMES DAILY PRN
Qty: 15 TABLET | Refills: 0 | Status: SHIPPED | OUTPATIENT
Start: 2022-01-01

## 2022-01-01 RX ADMIN — SODIUM CHLORIDE 8 MG/HR: 900 INJECTION INTRAVENOUS at 00:17

## 2022-01-01 RX ADMIN — SODIUM CHLORIDE, POTASSIUM CHLORIDE, SODIUM LACTATE AND CALCIUM CHLORIDE 100 ML/HR: 600; 310; 30; 20 INJECTION, SOLUTION INTRAVENOUS at 04:54

## 2022-01-01 RX ADMIN — LABETALOL HYDROCHLORIDE 10 MG: 5 INJECTION, SOLUTION INTRAVENOUS at 13:57

## 2022-01-01 RX ADMIN — Medication 10 ML: at 08:59

## 2022-01-01 RX ADMIN — Medication 1 CAPSULE: at 08:28

## 2022-01-01 RX ADMIN — SODIUM CHLORIDE 8 MG/HR: 900 INJECTION INTRAVENOUS at 19:47

## 2022-01-01 RX ADMIN — LACTULOSE 20 G: 20 SOLUTION ORAL at 00:41

## 2022-01-01 RX ADMIN — LACTULOSE 20 G: 20 SOLUTION ORAL at 17:43

## 2022-01-01 RX ADMIN — LIDOCAINE HYDROCHLORIDE 5 ML: 10 INJECTION, SOLUTION EPIDURAL; INFILTRATION; INTRACAUDAL; PERINEURAL at 11:43

## 2022-01-01 RX ADMIN — Medication 10 ML: at 08:48

## 2022-01-01 RX ADMIN — LABETALOL HYDROCHLORIDE 10 MG: 5 INJECTION, SOLUTION INTRAVENOUS at 10:48

## 2022-01-01 RX ADMIN — Medication 10 ML: at 21:04

## 2022-01-01 RX ADMIN — LORAZEPAM 1 MG: 2 INJECTION INTRAMUSCULAR; INTRAVENOUS at 13:56

## 2022-01-01 RX ADMIN — IOPAMIDOL 100 ML: 755 INJECTION, SOLUTION INTRAVENOUS at 12:03

## 2022-01-01 RX ADMIN — Medication 10 ML: at 21:41

## 2022-01-01 RX ADMIN — SODIUM CHLORIDE 8 MG/HR: 900 INJECTION INTRAVENOUS at 08:08

## 2022-01-01 RX ADMIN — OCTREOTIDE ACETATE 50 MCG/HR: 500 INJECTION, SOLUTION INTRAVENOUS; SUBCUTANEOUS at 09:21

## 2022-01-01 RX ADMIN — Medication 10 ML: at 09:50

## 2022-01-01 RX ADMIN — LACTULOSE 20 G: 20 SOLUTION ORAL at 01:33

## 2022-01-01 RX ADMIN — LORAZEPAM 1 MG: 2 INJECTION INTRAMUSCULAR; INTRAVENOUS at 22:34

## 2022-01-01 RX ADMIN — SODIUM CHLORIDE 8 MG/HR: 900 INJECTION INTRAVENOUS at 10:45

## 2022-01-01 RX ADMIN — Medication 10 ML: at 08:29

## 2022-01-01 RX ADMIN — OCTREOTIDE ACETATE 50 MCG/HR: 500 INJECTION, SOLUTION INTRAVENOUS; SUBCUTANEOUS at 08:47

## 2022-01-01 RX ADMIN — Medication 10 ML: at 20:03

## 2022-01-01 RX ADMIN — LACTULOSE 20 G: 20 SOLUTION ORAL at 23:46

## 2022-01-01 RX ADMIN — LABETALOL HYDROCHLORIDE 10 MG: 5 INJECTION, SOLUTION INTRAVENOUS at 22:08

## 2022-01-01 RX ADMIN — LACTULOSE 20 G: 20 SOLUTION ORAL at 22:34

## 2022-01-01 RX ADMIN — LACTULOSE 20 G: 20 SOLUTION ORAL at 18:48

## 2022-01-01 RX ADMIN — Medication 10 ML: at 10:23

## 2022-01-01 RX ADMIN — OCTREOTIDE ACETATE 50 MCG/HR: 500 INJECTION, SOLUTION INTRAVENOUS; SUBCUTANEOUS at 05:23

## 2022-01-01 RX ADMIN — LACTULOSE 20 G: 20 SOLUTION ORAL at 19:13

## 2022-01-01 RX ADMIN — Medication 10 ML: at 10:17

## 2022-01-01 RX ADMIN — SODIUM CHLORIDE 8 MG/HR: 900 INJECTION INTRAVENOUS at 05:40

## 2022-01-01 RX ADMIN — Medication 10 ML: at 20:38

## 2022-01-01 RX ADMIN — SODIUM CHLORIDE 8 MG/HR: 900 INJECTION INTRAVENOUS at 23:52

## 2022-01-01 RX ADMIN — LORAZEPAM 1 MG: 2 INJECTION INTRAMUSCULAR; INTRAVENOUS at 02:36

## 2022-01-01 RX ADMIN — Medication 10 ML: at 21:33

## 2022-01-01 RX ADMIN — RIFAXIMIN 600 MG: 200 TABLET ORAL at 20:04

## 2022-01-01 RX ADMIN — SODIUM CHLORIDE 8 MG/HR: 900 INJECTION INTRAVENOUS at 12:47

## 2022-01-01 RX ADMIN — LABETALOL HYDROCHLORIDE 10 MG: 5 INJECTION, SOLUTION INTRAVENOUS at 06:20

## 2022-01-01 RX ADMIN — Medication 10 ML: at 09:29

## 2022-01-01 RX ADMIN — OCTREOTIDE ACETATE 50 MCG/HR: 500 INJECTION, SOLUTION INTRAVENOUS; SUBCUTANEOUS at 00:38

## 2022-01-01 RX ADMIN — SODIUM CHLORIDE 8 MG/HR: 900 INJECTION INTRAVENOUS at 18:05

## 2022-01-01 RX ADMIN — PIPERACILLIN AND TAZOBACTAM 4.5 G: 4; .5 INJECTION, POWDER, LYOPHILIZED, FOR SOLUTION INTRAVENOUS; PARENTERAL at 00:38

## 2022-01-01 RX ADMIN — LACTULOSE 20 G: 20 SOLUTION ORAL at 06:04

## 2022-01-01 RX ADMIN — Medication 1 CAPSULE: at 09:21

## 2022-01-01 RX ADMIN — SODIUM CHLORIDE 8 MG/HR: 900 INJECTION INTRAVENOUS at 12:27

## 2022-01-01 RX ADMIN — FUROSEMIDE 40 MG: 10 INJECTION, SOLUTION INTRAMUSCULAR; INTRAVENOUS at 08:20

## 2022-01-01 RX ADMIN — SODIUM CHLORIDE 8 MG/HR: 900 INJECTION INTRAVENOUS at 19:25

## 2022-01-01 RX ADMIN — SODIUM CHLORIDE 8 MG/HR: 900 INJECTION INTRAVENOUS at 15:40

## 2022-01-01 RX ADMIN — DEXTROSE MONOHYDRATE 125 ML/HR: 50 INJECTION, SOLUTION INTRAVENOUS at 19:59

## 2022-01-01 RX ADMIN — SODIUM CHLORIDE, POTASSIUM CHLORIDE, SODIUM LACTATE AND CALCIUM CHLORIDE 100 ML/HR: 600; 310; 30; 20 INJECTION, SOLUTION INTRAVENOUS at 14:28

## 2022-01-01 RX ADMIN — LACTULOSE 20 G: 20 SOLUTION ORAL at 13:56

## 2022-01-01 RX ADMIN — Medication 10 ML: at 21:31

## 2022-01-01 RX ADMIN — GABAPENTIN 400 MG: 400 CAPSULE ORAL at 05:04

## 2022-01-01 RX ADMIN — Medication 10 ML: at 08:53

## 2022-01-01 RX ADMIN — AMOXICILLIN AND CLAVULANATE POTASSIUM 1 TABLET: 875; 125 TABLET, FILM COATED ORAL at 08:52

## 2022-01-01 RX ADMIN — Medication 10 ML: at 20:57

## 2022-01-01 RX ADMIN — LACTULOSE 20 G: 20 SOLUTION ORAL at 17:46

## 2022-01-01 RX ADMIN — LORAZEPAM 1 MG: 2 INJECTION INTRAMUSCULAR; INTRAVENOUS at 09:08

## 2022-01-01 RX ADMIN — PIPERACILLIN AND TAZOBACTAM 4.5 G: 4; .5 INJECTION, POWDER, LYOPHILIZED, FOR SOLUTION INTRAVENOUS; PARENTERAL at 17:31

## 2022-01-01 RX ADMIN — SODIUM CHLORIDE 8 MG/HR: 900 INJECTION INTRAVENOUS at 17:28

## 2022-01-01 RX ADMIN — LACTULOSE 20 G: 20 SOLUTION ORAL at 12:31

## 2022-01-01 RX ADMIN — OCTREOTIDE ACETATE 50 MCG/HR: 500 INJECTION, SOLUTION INTRAVENOUS; SUBCUTANEOUS at 00:58

## 2022-01-01 RX ADMIN — OCTREOTIDE ACETATE 50 MCG/HR: 500 INJECTION, SOLUTION INTRAVENOUS; SUBCUTANEOUS at 21:41

## 2022-01-01 RX ADMIN — PANTOPRAZOLE SODIUM 40 MG: 40 INJECTION, POWDER, FOR SOLUTION INTRAVENOUS at 05:01

## 2022-01-01 RX ADMIN — PIPERACILLIN AND TAZOBACTAM 4.5 G: 4; .5 INJECTION, POWDER, LYOPHILIZED, FOR SOLUTION INTRAVENOUS; PARENTERAL at 08:36

## 2022-01-01 RX ADMIN — ONDANSETRON 4 MG: 2 INJECTION INTRAMUSCULAR; INTRAVENOUS at 00:22

## 2022-01-01 RX ADMIN — LABETALOL HYDROCHLORIDE 10 MG: 5 INJECTION, SOLUTION INTRAVENOUS at 08:38

## 2022-01-01 RX ADMIN — PIPERACILLIN AND TAZOBACTAM 4.5 G: 4; .5 INJECTION, POWDER, LYOPHILIZED, FOR SOLUTION INTRAVENOUS; PARENTERAL at 18:10

## 2022-01-01 RX ADMIN — LACTULOSE 20 G: 20 SOLUTION ORAL at 03:18

## 2022-01-01 RX ADMIN — GABAPENTIN 600 MG: 300 CAPSULE ORAL at 14:01

## 2022-01-01 RX ADMIN — AMOXICILLIN AND CLAVULANATE POTASSIUM 1 TABLET: 875; 125 TABLET, FILM COATED ORAL at 20:53

## 2022-01-01 RX ADMIN — OCTREOTIDE ACETATE 50 MCG/HR: 500 INJECTION, SOLUTION INTRAVENOUS; SUBCUTANEOUS at 20:56

## 2022-01-01 RX ADMIN — SODIUM CHLORIDE, POTASSIUM CHLORIDE, SODIUM LACTATE AND CALCIUM CHLORIDE 100 ML/HR: 600; 310; 30; 20 INJECTION, SOLUTION INTRAVENOUS at 00:35

## 2022-01-01 RX ADMIN — TRIAMCINOLONE ACETONIDE 20 MG: 40 INJECTION, SUSPENSION INTRA-ARTICULAR; INTRAMUSCULAR at 15:04

## 2022-01-01 RX ADMIN — Medication 10 ML: at 11:22

## 2022-01-01 RX ADMIN — PIPERACILLIN AND TAZOBACTAM 4.5 G: 4; .5 INJECTION, POWDER, LYOPHILIZED, FOR SOLUTION INTRAVENOUS; PARENTERAL at 00:57

## 2022-01-01 RX ADMIN — LACTULOSE 20 G: 20 SOLUTION ORAL at 08:21

## 2022-01-01 RX ADMIN — Medication 10 ML: at 08:21

## 2022-01-01 RX ADMIN — SODIUM CHLORIDE 8 MG/HR: 900 INJECTION INTRAVENOUS at 01:23

## 2022-01-01 RX ADMIN — Medication 10 ML: at 22:33

## 2022-01-01 RX ADMIN — GLYCOPYRROLATE 0.1 MG: 0.2 INJECTION INTRAMUSCULAR; INTRAVENOUS at 22:34

## 2022-01-01 RX ADMIN — LACTULOSE 20 G: 20 SOLUTION ORAL at 03:22

## 2022-01-01 RX ADMIN — SODIUM CHLORIDE, POTASSIUM CHLORIDE, SODIUM LACTATE AND CALCIUM CHLORIDE 100 ML/HR: 600; 310; 30; 20 INJECTION, SOLUTION INTRAVENOUS at 12:27

## 2022-01-01 RX ADMIN — LACTULOSE 20 G: 20 SOLUTION ORAL at 01:26

## 2022-01-01 RX ADMIN — OCTREOTIDE ACETATE 50 MCG/HR: 500 INJECTION, SOLUTION INTRAVENOUS; SUBCUTANEOUS at 22:16

## 2022-01-01 RX ADMIN — SODIUM CHLORIDE, POTASSIUM CHLORIDE, SODIUM LACTATE AND CALCIUM CHLORIDE 100 ML/HR: 600; 310; 30; 20 INJECTION, SOLUTION INTRAVENOUS at 21:49

## 2022-01-01 RX ADMIN — SODIUM CHLORIDE 8 MG/HR: 900 INJECTION INTRAVENOUS at 02:54

## 2022-01-01 RX ADMIN — TRIAMCINOLONE ACETONIDE 40 MG: 40 INJECTION, SUSPENSION INTRA-ARTICULAR; INTRAMUSCULAR at 15:04

## 2022-01-01 RX ADMIN — RIFAXIMIN 600 MG: 200 TABLET ORAL at 10:14

## 2022-01-01 RX ADMIN — Medication 10 ML: at 20:26

## 2022-01-01 RX ADMIN — SODIUM CHLORIDE 8 MG/HR: 900 INJECTION INTRAVENOUS at 11:14

## 2022-01-01 RX ADMIN — GABAPENTIN 600 MG: 300 CAPSULE ORAL at 21:21

## 2022-01-01 RX ADMIN — Medication 10 ML: at 08:27

## 2022-01-01 RX ADMIN — LACTULOSE 20 G: 20 SOLUTION ORAL at 06:38

## 2022-01-01 RX ADMIN — RIFAXIMIN 600 MG: 200 TABLET ORAL at 08:28

## 2022-01-01 RX ADMIN — LACTULOSE 20 G: 20 SOLUTION ORAL at 00:40

## 2022-01-01 RX ADMIN — GABAPENTIN 600 MG: 300 CAPSULE ORAL at 21:13

## 2022-01-01 RX ADMIN — Medication 1 CAPSULE: at 08:20

## 2022-01-01 RX ADMIN — LIDOCAINE HYDROCHLORIDE 5 ML: 10 INJECTION, SOLUTION INFILTRATION; PERINEURAL at 10:15

## 2022-01-01 RX ADMIN — LACTULOSE 20 G: 20 SOLUTION ORAL at 14:31

## 2022-01-01 RX ADMIN — SODIUM CHLORIDE 8 MG/HR: 900 INJECTION INTRAVENOUS at 05:13

## 2022-01-01 RX ADMIN — OCTREOTIDE ACETATE 50 MCG/HR: 500 INJECTION, SOLUTION INTRAVENOUS; SUBCUTANEOUS at 09:57

## 2022-01-01 RX ADMIN — LACTULOSE 20 G: 20 SOLUTION ORAL at 12:17

## 2022-01-01 RX ADMIN — SODIUM CHLORIDE 8 MG/HR: 900 INJECTION INTRAVENOUS at 11:20

## 2022-01-01 RX ADMIN — SODIUM CHLORIDE 8 MG/HR: 900 INJECTION INTRAVENOUS at 11:45

## 2022-01-01 RX ADMIN — PIPERACILLIN AND TAZOBACTAM 4.5 G: 4; .5 INJECTION, POWDER, LYOPHILIZED, FOR SOLUTION INTRAVENOUS; PARENTERAL at 17:21

## 2022-01-01 RX ADMIN — LACTULOSE 20 G: 20 SOLUTION ORAL at 05:57

## 2022-01-01 RX ADMIN — SODIUM CHLORIDE, POTASSIUM CHLORIDE, SODIUM LACTATE AND CALCIUM CHLORIDE 125 ML/HR: 600; 310; 30; 20 INJECTION, SOLUTION INTRAVENOUS at 15:02

## 2022-01-01 RX ADMIN — OCTREOTIDE ACETATE 50 MCG/HR: 500 INJECTION, SOLUTION INTRAVENOUS; SUBCUTANEOUS at 17:43

## 2022-01-01 RX ADMIN — SODIUM CHLORIDE, POTASSIUM CHLORIDE, SODIUM LACTATE AND CALCIUM CHLORIDE 150 ML/HR: 600; 310; 30; 20 INJECTION, SOLUTION INTRAVENOUS at 09:08

## 2022-01-01 RX ADMIN — LACTULOSE 20 G: 20 SOLUTION ORAL at 00:08

## 2022-01-01 RX ADMIN — VANCOMYCIN HYDROCHLORIDE 2000 MG: 1 INJECTION, POWDER, LYOPHILIZED, FOR SOLUTION INTRAVENOUS at 14:23

## 2022-01-01 RX ADMIN — Medication 10 ML: at 22:32

## 2022-01-01 RX ADMIN — SODIUM CHLORIDE 8 MG/HR: 900 INJECTION INTRAVENOUS at 03:16

## 2022-01-01 RX ADMIN — POTASSIUM CHLORIDE 40 MEQ: 1.5 POWDER, FOR SOLUTION ORAL at 16:26

## 2022-01-01 RX ADMIN — Medication 10 ML: at 10:14

## 2022-01-01 RX ADMIN — LACTULOSE 20 G: 20 SOLUTION ORAL at 06:03

## 2022-01-01 RX ADMIN — LACTULOSE 20 G: 20 SOLUTION ORAL at 02:06

## 2022-01-01 RX ADMIN — LACTULOSE 20 G: 20 SOLUTION ORAL at 20:26

## 2022-01-01 RX ADMIN — Medication 10 ML: at 20:58

## 2022-01-01 RX ADMIN — SODIUM CHLORIDE 8 MG/HR: 900 INJECTION INTRAVENOUS at 00:46

## 2022-01-01 RX ADMIN — OCTREOTIDE ACETATE 50 MCG/HR: 500 INJECTION, SOLUTION INTRAVENOUS; SUBCUTANEOUS at 11:19

## 2022-01-01 RX ADMIN — SODIUM CHLORIDE 8 MG/HR: 900 INJECTION INTRAVENOUS at 12:37

## 2022-01-01 RX ADMIN — LIDOCAINE HYDROCHLORIDE 100 MG: 10 INJECTION, SOLUTION EPIDURAL; INFILTRATION; INTRACAUDAL; PERINEURAL at 17:10

## 2022-01-01 RX ADMIN — Medication 1 CAPSULE: at 08:52

## 2022-01-01 RX ADMIN — POTASSIUM CHLORIDE 40 MEQ: 1.5 POWDER, FOR SOLUTION ORAL at 20:26

## 2022-01-01 RX ADMIN — Medication 10 ML: at 09:26

## 2022-01-01 RX ADMIN — SODIUM CHLORIDE 8 MG/HR: 900 INJECTION INTRAVENOUS at 22:49

## 2022-01-01 RX ADMIN — SODIUM CHLORIDE 8 MG/HR: 900 INJECTION INTRAVENOUS at 20:09

## 2022-01-01 RX ADMIN — SODIUM CHLORIDE 40 ML: 900 INJECTION, SOLUTION INTRAVENOUS at 06:20

## 2022-01-01 RX ADMIN — SODIUM CHLORIDE, POTASSIUM CHLORIDE, SODIUM LACTATE AND CALCIUM CHLORIDE 150 ML/HR: 600; 310; 30; 20 INJECTION, SOLUTION INTRAVENOUS at 14:33

## 2022-01-01 RX ADMIN — Medication 10 ML: at 08:20

## 2022-01-01 RX ADMIN — OCTREOTIDE ACETATE 50 MCG/HR: 500 INJECTION, SOLUTION INTRAVENOUS; SUBCUTANEOUS at 08:25

## 2022-01-01 RX ADMIN — Medication 10 ML: at 09:02

## 2022-01-01 RX ADMIN — FUROSEMIDE 40 MG: 10 INJECTION, SOLUTION INTRAMUSCULAR; INTRAVENOUS at 18:48

## 2022-01-01 RX ADMIN — FUROSEMIDE 40 MG: 10 INJECTION, SOLUTION INTRAMUSCULAR; INTRAVENOUS at 17:37

## 2022-01-01 RX ADMIN — GABAPENTIN 200 MG: 100 CAPSULE ORAL at 04:18

## 2022-01-01 RX ADMIN — OCTREOTIDE ACETATE 50 MCG/HR: 500 INJECTION, SOLUTION INTRAVENOUS; SUBCUTANEOUS at 19:09

## 2022-01-01 RX ADMIN — SODIUM CHLORIDE 8 MG/HR: 900 INJECTION INTRAVENOUS at 13:19

## 2022-01-01 RX ADMIN — FUROSEMIDE 40 MG: 10 INJECTION, SOLUTION INTRAMUSCULAR; INTRAVENOUS at 22:17

## 2022-01-01 RX ADMIN — SODIUM CHLORIDE: 900 INJECTION, SOLUTION INTRAVENOUS at 03:30

## 2022-01-01 RX ADMIN — SODIUM CHLORIDE, POTASSIUM CHLORIDE, SODIUM LACTATE AND CALCIUM CHLORIDE 1000 ML: 600; 310; 30; 20 INJECTION, SOLUTION INTRAVENOUS at 00:18

## 2022-01-01 RX ADMIN — Medication 10 ML: at 08:25

## 2022-01-01 RX ADMIN — SODIUM CHLORIDE 8 MG/HR: 900 INJECTION INTRAVENOUS at 05:43

## 2022-01-01 RX ADMIN — SODIUM CHLORIDE 8 MG/HR: 900 INJECTION INTRAVENOUS at 06:27

## 2022-01-01 RX ADMIN — PIPERACILLIN AND TAZOBACTAM 4.5 G: 4; .5 INJECTION, POWDER, LYOPHILIZED, FOR SOLUTION INTRAVENOUS; PARENTERAL at 01:26

## 2022-01-01 RX ADMIN — LACTULOSE 20 G: 20 SOLUTION ORAL at 15:44

## 2022-01-01 RX ADMIN — FUROSEMIDE 40 MG: 10 INJECTION, SOLUTION INTRAMUSCULAR; INTRAVENOUS at 17:26

## 2022-01-01 RX ADMIN — Medication 10 ML: at 09:10

## 2022-01-01 RX ADMIN — FUROSEMIDE 40 MG: 10 INJECTION, SOLUTION INTRAMUSCULAR; INTRAVENOUS at 08:28

## 2022-01-01 RX ADMIN — SODIUM CHLORIDE 8 MG/HR: 900 INJECTION INTRAVENOUS at 14:22

## 2022-01-01 RX ADMIN — LACTULOSE 20 G: 20 SOLUTION ORAL at 14:59

## 2022-01-01 RX ADMIN — SODIUM CHLORIDE, POTASSIUM CHLORIDE, SODIUM LACTATE AND CALCIUM CHLORIDE 100 ML/HR: 600; 310; 30; 20 INJECTION, SOLUTION INTRAVENOUS at 19:07

## 2022-01-01 RX ADMIN — SODIUM CHLORIDE, POTASSIUM CHLORIDE, SODIUM LACTATE AND CALCIUM CHLORIDE 150 ML/HR: 600; 310; 30; 20 INJECTION, SOLUTION INTRAVENOUS at 02:01

## 2022-01-01 RX ADMIN — OCTREOTIDE ACETATE 50 MCG/HR: 500 INJECTION, SOLUTION INTRAVENOUS; SUBCUTANEOUS at 05:51

## 2022-01-01 RX ADMIN — LACTULOSE 20 G: 20 SOLUTION ORAL at 00:46

## 2022-01-01 RX ADMIN — PIPERACILLIN AND TAZOBACTAM 4.5 G: 4; .5 INJECTION, POWDER, LYOPHILIZED, FOR SOLUTION INTRAVENOUS; PARENTERAL at 09:58

## 2022-01-01 RX ADMIN — LACTULOSE 20 G: 20 SOLUTION ORAL at 14:32

## 2022-01-01 RX ADMIN — SODIUM CHLORIDE 8 MG/HR: 900 INJECTION INTRAVENOUS at 09:09

## 2022-01-01 RX ADMIN — SODIUM CHLORIDE, POTASSIUM CHLORIDE, SODIUM LACTATE AND CALCIUM CHLORIDE 100 ML/HR: 600; 310; 30; 20 INJECTION, SOLUTION INTRAVENOUS at 06:04

## 2022-01-01 RX ADMIN — SODIUM CHLORIDE 8 MG/HR: 900 INJECTION INTRAVENOUS at 18:28

## 2022-01-01 RX ADMIN — PIPERACILLIN AND TAZOBACTAM 4.5 G: 4; .5 INJECTION, POWDER, LYOPHILIZED, FOR SOLUTION INTRAVENOUS; PARENTERAL at 09:49

## 2022-01-01 RX ADMIN — LACTULOSE 20 G: 20 SOLUTION ORAL at 01:58

## 2022-01-01 RX ADMIN — LACTULOSE 20 G: 20 SOLUTION ORAL at 12:43

## 2022-01-01 RX ADMIN — OCTREOTIDE ACETATE 50 MCG/HR: 500 INJECTION, SOLUTION INTRAVENOUS; SUBCUTANEOUS at 20:12

## 2022-01-01 RX ADMIN — GABAPENTIN 600 MG: 300 CAPSULE ORAL at 05:34

## 2022-01-01 RX ADMIN — LACTULOSE 20 G: 20 SOLUTION ORAL at 06:21

## 2022-01-01 RX ADMIN — PIPERACILLIN AND TAZOBACTAM 4.5 G: 4; .5 INJECTION, POWDER, LYOPHILIZED, FOR SOLUTION INTRAVENOUS; PARENTERAL at 08:22

## 2022-01-01 RX ADMIN — PANTOPRAZOLE SODIUM 40 MG: 40 INJECTION, POWDER, FOR SOLUTION INTRAVENOUS at 00:21

## 2022-01-01 RX ADMIN — LABETALOL HYDROCHLORIDE 10 MG: 5 INJECTION, SOLUTION INTRAVENOUS at 02:06

## 2022-01-01 RX ADMIN — PIPERACILLIN AND TAZOBACTAM 4.5 G: 4; .5 INJECTION, POWDER, LYOPHILIZED, FOR SOLUTION INTRAVENOUS; PARENTERAL at 08:24

## 2022-01-01 RX ADMIN — SODIUM CHLORIDE 8 MG/HR: 900 INJECTION INTRAVENOUS at 05:51

## 2022-01-01 RX ADMIN — SODIUM CHLORIDE 8 MG/HR: 900 INJECTION INTRAVENOUS at 17:31

## 2022-01-01 RX ADMIN — LACTULOSE 20 G: 20 SOLUTION ORAL at 18:07

## 2022-01-01 RX ADMIN — PIPERACILLIN AND TAZOBACTAM 4.5 G: 4; .5 INJECTION, POWDER, LYOPHILIZED, FOR SOLUTION INTRAVENOUS; PARENTERAL at 09:11

## 2022-01-01 RX ADMIN — LACTULOSE 20 G: 20 SOLUTION ORAL at 09:46

## 2022-01-01 RX ADMIN — GABAPENTIN 400 MG: 400 CAPSULE ORAL at 04:00

## 2022-01-01 RX ADMIN — LACTULOSE 20 G: 20 SOLUTION ORAL at 06:30

## 2022-01-01 RX ADMIN — DEXTROSE MONOHYDRATE 125 ML/HR: 50 INJECTION, SOLUTION INTRAVENOUS at 08:31

## 2022-01-01 RX ADMIN — LACTULOSE 20 G: 20 SOLUTION ORAL at 18:11

## 2022-01-01 RX ADMIN — LACTULOSE 20 G: 20 SOLUTION ORAL at 12:47

## 2022-01-01 RX ADMIN — LIDOCAINE 1 PATCH: 50 PATCH CUTANEOUS at 19:52

## 2022-01-01 RX ADMIN — PIPERACILLIN AND TAZOBACTAM 4.5 G: 4; .5 INJECTION, POWDER, LYOPHILIZED, FOR SOLUTION INTRAVENOUS; PARENTERAL at 01:05

## 2022-01-01 RX ADMIN — PANTOPRAZOLE SODIUM 40 MG: 40 INJECTION, POWDER, FOR SOLUTION INTRAVENOUS at 06:31

## 2022-01-01 RX ADMIN — LACTULOSE 20 G: 20 SOLUTION ORAL at 13:49

## 2022-01-01 RX ADMIN — SODIUM CHLORIDE 8 MG/HR: 900 INJECTION INTRAVENOUS at 17:15

## 2022-01-01 RX ADMIN — OCTREOTIDE ACETATE 50 MCG/HR: 500 INJECTION, SOLUTION INTRAVENOUS; SUBCUTANEOUS at 18:07

## 2022-01-01 RX ADMIN — LACTULOSE 20 G: 20 SOLUTION ORAL at 05:42

## 2022-01-01 RX ADMIN — Medication 10 ML: at 09:16

## 2022-01-01 RX ADMIN — OCTREOTIDE ACETATE 500 MCG: 500 INJECTION, SOLUTION INTRAVENOUS; SUBCUTANEOUS at 00:58

## 2022-01-01 RX ADMIN — ACETAMINOPHEN 650 MG: 325 TABLET ORAL at 21:21

## 2022-01-01 RX ADMIN — RIFAXIMIN 600 MG: 200 TABLET ORAL at 22:33

## 2022-01-01 RX ADMIN — GABAPENTIN 600 MG: 300 CAPSULE ORAL at 05:57

## 2022-01-01 RX ADMIN — Medication 10 ML: at 20:04

## 2022-01-01 RX ADMIN — Medication 10 ML: at 21:34

## 2022-01-01 RX ADMIN — LACTULOSE 20 G: 20 SOLUTION ORAL at 22:21

## 2022-01-01 RX ADMIN — AMOXICILLIN AND CLAVULANATE POTASSIUM 1 TABLET: 875; 125 TABLET, FILM COATED ORAL at 09:21

## 2022-01-01 RX ADMIN — PROPOFOL INJECTABLE EMULSION 250 MG: 10 INJECTION, EMULSION INTRAVENOUS at 17:10

## 2022-01-01 RX ADMIN — SODIUM CHLORIDE 8 MG/HR: 900 INJECTION INTRAVENOUS at 02:59

## 2022-01-01 RX ADMIN — SODIUM CHLORIDE 8 MG/HR: 900 INJECTION INTRAVENOUS at 07:04

## 2022-01-01 RX ADMIN — LABETALOL HYDROCHLORIDE 10 MG: 5 INJECTION, SOLUTION INTRAVENOUS at 04:18

## 2022-01-01 RX ADMIN — Medication 10 ML: at 08:45

## 2022-01-01 RX ADMIN — PANTOPRAZOLE SODIUM 40 MG: 40 INJECTION, POWDER, FOR SOLUTION INTRAVENOUS at 05:48

## 2022-01-01 RX ADMIN — LACTULOSE 20 G: 20 SOLUTION ORAL at 13:18

## 2022-01-01 RX ADMIN — LACTULOSE 20 G: 20 SOLUTION ORAL at 22:46

## 2022-01-01 RX ADMIN — FUROSEMIDE 40 MG: 10 INJECTION, SOLUTION INTRAMUSCULAR; INTRAVENOUS at 22:33

## 2022-01-01 RX ADMIN — SODIUM CHLORIDE 8 MG/HR: 900 INJECTION INTRAVENOUS at 21:48

## 2022-01-01 RX ADMIN — SODIUM CHLORIDE 8 MG/HR: 900 INJECTION INTRAVENOUS at 21:13

## 2022-01-01 RX ADMIN — OCTREOTIDE ACETATE 50 MCG/HR: 500 INJECTION, SOLUTION INTRAVENOUS; SUBCUTANEOUS at 03:56

## 2022-01-01 RX ADMIN — SODIUM CHLORIDE 100 ML: 9 INJECTION, SOLUTION INTRAVENOUS at 00:58

## 2022-01-01 RX ADMIN — SODIUM CHLORIDE 8 MG/HR: 900 INJECTION INTRAVENOUS at 06:02

## 2022-01-01 RX ADMIN — LACTULOSE 20 G: 20 SOLUTION ORAL at 11:13

## 2022-01-01 RX ADMIN — SODIUM CHLORIDE 8 MG/HR: 900 INJECTION INTRAVENOUS at 00:37

## 2022-01-01 RX ADMIN — OCTREOTIDE ACETATE 50 MCG: 50 INJECTION, SOLUTION INTRAVENOUS; SUBCUTANEOUS at 00:38

## 2022-01-01 RX ADMIN — AMOXICILLIN AND CLAVULANATE POTASSIUM 1 TABLET: 875; 125 TABLET, FILM COATED ORAL at 21:31

## 2022-01-01 RX ADMIN — MORPHINE SULFATE 1 MG: 2 INJECTION, SOLUTION INTRAMUSCULAR; INTRAVENOUS at 02:36

## 2022-01-01 RX ADMIN — SODIUM CHLORIDE, POTASSIUM CHLORIDE, SODIUM LACTATE AND CALCIUM CHLORIDE 150 ML/HR: 600; 310; 30; 20 INJECTION, SOLUTION INTRAVENOUS at 05:39

## 2022-01-01 RX ADMIN — HALOPERIDOL LACTATE 2 MG: 5 INJECTION, SOLUTION INTRAMUSCULAR at 12:27

## 2022-01-01 RX ADMIN — HYDROMORPHONE HYDROCHLORIDE 0.5 MG: 2 INJECTION, SOLUTION INTRAMUSCULAR; INTRAVENOUS; SUBCUTANEOUS at 15:21

## 2022-01-01 RX ADMIN — RIFAXIMIN 600 MG: 200 TABLET ORAL at 21:04

## 2022-01-01 RX ADMIN — PANTOPRAZOLE SODIUM 40 MG: 40 INJECTION, POWDER, FOR SOLUTION INTRAVENOUS at 06:38

## 2022-01-01 RX ADMIN — PIPERACILLIN AND TAZOBACTAM 4.5 G: 4; .5 INJECTION, POWDER, LYOPHILIZED, FOR SOLUTION INTRAVENOUS; PARENTERAL at 00:41

## 2022-01-01 RX ADMIN — OCTREOTIDE ACETATE 50 MCG/HR: 500 INJECTION, SOLUTION INTRAVENOUS; SUBCUTANEOUS at 06:02

## 2022-01-01 RX ADMIN — PIPERACILLIN AND TAZOBACTAM 4.5 G: 4; .5 INJECTION, POWDER, LYOPHILIZED, FOR SOLUTION INTRAVENOUS; PARENTERAL at 16:31

## 2022-01-01 RX ADMIN — Medication 10 ML: at 21:46

## 2022-01-01 RX ADMIN — SODIUM CHLORIDE 8 MG/HR: 900 INJECTION INTRAVENOUS at 00:44

## 2022-01-01 RX ADMIN — RIFAXIMIN 600 MG: 200 TABLET ORAL at 08:19

## 2022-01-01 RX ADMIN — Medication 10 ML: at 08:22

## 2022-01-01 RX ADMIN — Medication 10 ML: at 09:21

## 2022-01-01 RX ADMIN — OCTREOTIDE ACETATE 50 MCG/HR: 500 INJECTION, SOLUTION INTRAVENOUS; SUBCUTANEOUS at 17:08

## 2022-01-01 RX ADMIN — OCTREOTIDE ACETATE 50 MCG/HR: 500 INJECTION, SOLUTION INTRAVENOUS; SUBCUTANEOUS at 14:07

## 2022-01-01 RX ADMIN — RIFAXIMIN 600 MG: 200 TABLET ORAL at 20:03

## 2022-01-01 RX ADMIN — GABAPENTIN 600 MG: 300 CAPSULE ORAL at 14:32

## 2022-01-01 RX ADMIN — FUROSEMIDE 40 MG: 10 INJECTION, SOLUTION INTRAMUSCULAR; INTRAVENOUS at 09:21

## 2022-01-01 RX ADMIN — LACTULOSE 20 G: 20 SOLUTION ORAL at 21:04

## 2022-01-01 RX ADMIN — Medication 1 CAPSULE: at 10:14

## 2022-01-01 RX ADMIN — LACTULOSE 20 G: 20 SOLUTION ORAL at 06:34

## 2022-01-01 RX ADMIN — FUROSEMIDE 40 MG: 10 INJECTION, SOLUTION INTRAMUSCULAR; INTRAVENOUS at 10:14

## 2022-01-01 RX ADMIN — PIPERACILLIN AND TAZOBACTAM 4.5 G: 4; .5 INJECTION, POWDER, LYOPHILIZED, FOR SOLUTION INTRAVENOUS; PARENTERAL at 17:28

## 2022-01-20 NOTE — PROGRESS NOTES
"Chief Complaint  Fatigue, Chills, Fever, and Cough    Subjective          Lionel Whitehead presents to [facility]  Pt has had 2 days of fever (up to 100.5F), rhinorrhea, dry cough, fatigue, and decreased appetite. He believes he has been tachycardic at home when febrile up to 105. Today his symptoms have improved but wanted to be evaluated to be sure. Pt is not vaccinated against covid-19 or influenza. Denies CP, SOB, anosmia.         Objective   Vital Signs:   Pulse 87   Temp 98.8 °F (37.1 °C)   Resp 18   Ht 185.4 cm (73\")   Wt 99.8 kg (220 lb)   SpO2 97%   BMI 29.03 kg/m²     Physical Exam  Vitals and nursing note reviewed.   Constitutional:       General: He is not in acute distress.     Appearance: Normal appearance.   HENT:      Head: Normocephalic and atraumatic.      Right Ear: External ear normal.      Left Ear: External ear normal.      Nose: Nose normal.      Mouth/Throat:      Mouth: Mucous membranes are moist.      Pharynx: Oropharynx is clear.   Eyes:      Extraocular Movements: Extraocular movements intact.      Conjunctiva/sclera: Conjunctivae normal.      Pupils: Pupils are equal, round, and reactive to light.   Cardiovascular:      Rate and Rhythm: Normal rate and regular rhythm.      Pulses: Normal pulses.      Heart sounds: Normal heart sounds. No murmur heard.  No gallop.    Pulmonary:      Effort: Pulmonary effort is normal.      Breath sounds: Normal breath sounds.   Abdominal:      General: Abdomen is flat. Bowel sounds are normal. There is no distension.      Palpations: Abdomen is soft. There is no mass.      Tenderness: There is no abdominal tenderness.   Musculoskeletal:         General: No swelling. Normal range of motion.      Cervical back: Normal range of motion and neck supple.   Skin:     General: Skin is warm and dry.      Findings: No rash.   Neurological:      General: No focal deficit present.      Mental Status: He is alert and " oriented to person, place, and time. Mental status is at baseline.   Psychiatric:         Mood and Affect: Mood normal.         Behavior: Behavior normal.        Result Review :                 Assessment and Plan    Diagnoses and all orders for this visit:    1. Viral infection (Primary)  -     POCT Influenza A/B  -     COVID-19,[facility] ROUTINE, NP/OP SWAB IN TRANSPORT MEDIA OR ESWAB 72 HR TAT - Swab, Nasopharynx; Future  -     COVID-19,[facility] ROUTINE, NP/OP SWAB IN TRANSPORT MEDIA OR ESWAB 72 HR TAT - Swab, Nasopharynx    - continue supportive care, fluids, hydration  - discussed risks and benefits of testing  - will follow covid and flu testing, consider monoclonal Ab therapy pending above for covid if positive  - tylenol for fever or body aches      Follow Up   No follow-ups on file.  Patient was given instructions and counseling regarding his condition or for health maintenance advice. Please see specific information pulled into the AVS if appropriate.

## 2022-01-26 NOTE — TELEPHONE ENCOUNTER
Caller: Lionel Whitehead    Relationship: Self    Best call back number: [phone]      What is the best time to reach you: ANYTIME    Who are you requesting to speak with (clinical staff, provider,  specific staff member): DOCTOR YUN      What was the call regarding: WANTS TO TALK WITH DOCTOR ABOUT HIS COVID SAYS DOCTOR HAS BEEN TAKING CARE OF THIS FOR HIM.    Do you require a callback: YES

## 2022-03-18 NOTE — TELEPHONE ENCOUNTER
DARNELL, A PHARMACIST AT [facility], CALLED AND SAID THAT SHE WOULD NEED TO TALK TO A PHYSICIAN PRIOR TO FILLING THIS PATIENT'S GABAPENTIN.  SHE SAID THAT THE DOSE WAS TOO MUCH.    THANK YOU

## 2022-03-18 NOTE — TELEPHONE ENCOUNTER
I S/W DARNELL AT [facility] AND RELAYED YOUR MESSAGE.  SHE SAID THAT IT WAS FINE.  SHE JUST NEEDED TO DOCUMENT THAT HE HAS NEVER HAD ANY ISSUES WITH IT.    THANK YOU

## 2022-06-07 NOTE — TELEPHONE ENCOUNTER
Pt called with knee pain concerns. He also needs a a refill on his gabapentin. He has an appointment on the 20th with . please advise

## 2022-06-27 NOTE — PROGRESS NOTES
"Chief Complaint  Knee Pain (Left )    Subjective        Lionel Whitehead presents to [facility]  Here with left knee pain; present for months, no redness, no swelling    HTN, doing well, no chest pain, sob, headaches, vision changes, no lows; keeping home log, normal        Objective   Vital Signs:  /76   Pulse 84   Temp 98.6 °F (37 °C)   Resp 18   Ht 185.4 cm (73\")   Wt 112 kg (247 lb)   SpO2 98%   BMI 32.59 kg/m²   Estimated body mass index is 32.59 kg/m² as calculated from the following:    Height as of this encounter: 185.4 cm (73\").    Weight as of this encounter: 112 kg (247 lb).    \plain      Physical Exam  Vitals and nursing note reviewed.   Constitutional:       General: He is not in acute distress.     Appearance: Normal appearance.   HENT:      Head: Normocephalic and atraumatic.      Right Ear: External ear normal.      Left Ear: External ear normal.      Nose: Nose normal.      Mouth/Throat:      Mouth: Mucous membranes are moist.      Pharynx: Oropharynx is clear.   Eyes:      Extraocular Movements: Extraocular movements intact.      Conjunctiva/sclera: Conjunctivae normal.      Pupils: Pupils are equal, round, and reactive to light.   Cardiovascular:      Rate and Rhythm: Normal rate and regular rhythm.      Pulses: Normal pulses.      Heart sounds: Normal heart sounds. No murmur heard.    No gallop.   Pulmonary:      Effort: Pulmonary effort is normal.      Breath sounds: Normal breath sounds.   Abdominal:      General: Abdomen is flat. Bowel sounds are normal. There is no distension.      Palpations: Abdomen is soft. There is no mass.      Tenderness: There is no abdominal tenderness.   Musculoskeletal:         General: No swelling. Normal range of motion.      Cervical back: Normal range of motion and neck supple.   Skin:     General: Skin is warm and dry.      Findings: No rash.   Neurological:      General: No focal deficit present.      " Mental Status: He is alert and oriented to person, place, and time. Mental status is at baseline.   Psychiatric:         Mood and Affect: Mood normal.         Behavior: Behavior normal.        Result Review :                Assessment and Plan   Diagnoses and all orders for this visit:    1. Elevated blood pressure reading (Primary)  -     Comprehensive Metabolic Panel  -     Lipid Panel With / Chol / HDL Ratio  -     Hemoglobin A1c    2. Elevated fasting glucose  -     Hemoglobin A1c    3. Hyperlipidemia, unspecified hyperlipidemia type   -     Lipid Panel With / Chol / HDL Ratio    4. Chronic pain of left knee  -     Ambulatory Referral to Orthopedic Surgery    Other orders  -     meloxicam (MOBIC) 15 MG tablet; Take 1 tablet by mouth Daily As Needed for Mild Pain .  Dispense: 30 tablet; Refill: 2    - labs as aboev, will adjust meds as needed though doing well  - referral to ortho discussed, may benefit from surgical intervention at this point; consider PT  - rt  follo wup pending above         Follow Up   No follow-ups on file.  Patient was given instructions and counseling regarding his condition or for health maintenance advice. Please see specific information pulled into the AVS if appropriate.       Answers for HPI/ROS submitted by the patient on 6/13/2022  What is the primary reason for your visit?: Other  Please describe your symptoms.: Knee. Dr. Healy shot it with cortisone a couple years ago. Would like to discuss shooting it again. Also, discuss thumb joints.  Have you had these symptoms before?: Yes  How long have you been having these symptoms?: Greater than 2 weeks

## 2022-11-21 NOTE — PROGRESS NOTES
"Chief Complaint  Shoulder Pain    Subjective        Lionel Whitehead presents to [facility]  History of Present Illness  Ongoing right shoulder pain for several weeks.  No injury but he has had a long history of physical labor over the years and feels like it is wearing tear.  Also had a severe lumbar radiculopathy 2019 and he had 15 Percocets from then still takes 1 very occasionally.  Wanted to see if he can get a refill for that.    Objective   Vital Signs:  /62 (BP Location: Left arm, Patient Position: Sitting, Cuff Size: Large Adult)   Pulse 92   Temp 97.3 °F (36.3 °C) (Temporal)   Resp 16   Ht 185.4 cm (73\")   Wt 105 kg (231 lb)   SpO2 96%   BMI 30.48 kg/m²   Estimated body mass index is 30.48 kg/m² as calculated from the following:    Height as of this encounter: 185.4 cm (73\").    Weight as of this encounter: 105 kg (231 lb).          Physical Exam  Constitutional:       Appearance: Normal appearance.   HENT:      Head: Normocephalic.   Eyes:      Pupils: Pupils are equal, round, and reactive to light.   Cardiovascular:      Rate and Rhythm: Normal rate and regular rhythm.      Pulses: Normal pulses.   Pulmonary:      Effort: Pulmonary effort is normal. No respiratory distress.      Breath sounds: Normal breath sounds.   Abdominal:      General: Abdomen is flat.      Palpations: Abdomen is soft.   Musculoskeletal:      Right shoulder: No bony tenderness.      Cervical back: Normal range of motion and neck supple. No rigidity.      Comments: Decreased range of motion.  No significant palpable tenderness.  Decreased internal and external rotation.  No weakness to resistance testing.  Reflexes normal in the upper extremities   Lymphadenopathy:      Cervical: No cervical adenopathy.        Result Review :         Arthrocentesis    Date/Time: 11/21/2022 3:04 PM  Performed by: Reymundo Healy MD (Tony)  Authorized by: Reymundo Healy (Tony), " MD   Indications: pain   Body area: shoulder  Joint: right shoulder  Local anesthesia used: yes    Anesthesia:  Local anesthesia used: yes  Local Anesthetic: lidocaine 2% without epinephrine    Sedation:  Patient sedated: no    Needle gauge: 25 gauge.  Ultrasound guidance: no  Approach: posterior  Meds administered: 40 mg triamcinolone acetonide 40 MG/ML; 20 mg triamcinolone acetonide 40 MG/ML  Patient tolerance: patient tolerated the procedure well with no immediate complications       Previous records reviewed     Assessment and Plan   Diagnoses and all orders for this visit:    1. Peripheral polyneuropathy (Primary)  -     oxyCODONE-acetaminophen (Percocet)  MG per tablet; Take 1 tablet by mouth 2 (Two) Times a Day As Needed for Moderate Pain.  Dispense: 15 tablet; Refill: 0  -     XR Shoulder 2+ View Right; Future    2. Chronic right shoulder pain  -     oxyCODONE-acetaminophen (Percocet)  MG per tablet; Take 1 tablet by mouth 2 (Two) Times a Day As Needed for Moderate Pain.  Dispense: 15 tablet; Refill: 0  -     XR Shoulder 2+ View Right; Future    Chronic right shoulder pain probably arthritic in nature but also rotator cuff wear.  Interested in doing a Kenalog injection today.  He tolerated the Kenalog injection very well.  Check an x-ray and follow-up pending results or in 4 weeks.  History of severe back injury years ago.  He took 20 Percocet over the past 3 years which he takes obviously very rarely but he did want a few more just in case needed.  #15 given.  UDS ordered.  Needs physical for care gap closure also          Follow Up   Return in about 4 weeks (around 12/19/2022).  Patient was given instructions and counseling regarding his condition or for health maintenance advice. Please see specific information pulled into the AVS if appropriate.

## 2022-12-06 PROBLEM — K92.2 UPPER GI BLEED: Status: ACTIVE | Noted: 2022-01-01

## 2022-12-06 NOTE — CASE MANAGEMENT/SOCIAL WORK
Discharge Planning Assessment  [facility]OUBAC[facility]     Patient Name: Lionel Whitehead  MRN: [id number]  Today's Date: 12/6/2022    Admit Date: 12/5/2022    Plan: Home with wigfe   Discharge Needs Assessment     Row Name 12/06/22 1411       Living Environment    People in Home spouse    Name(s) of People in Home ngoc Caceres    Current Living Arrangements home    Duration at Residence 30 Y    Potentially Unsafe Housing Conditions --  none    Primary Care Provided by self    Provides Primary Care For no one    Caregiving Concerns no care giving concerns voiced by patient at this time.    Family Caregiver if Needed spouse    Family Caregiver Names ngoc Ackerman    Quality of Family Relationships helpful;involved;supportive    Able to Return to Prior Arrangements yes    Living Arrangement Comments Patient states he lives with his wife in a single story house with one step to gain entry       Resource/Environmental Concerns    Resource/Environmental Concerns none    Transportation Concerns none       Transition Planning    Patient/Family Anticipates Transition to home with family    Patient/Family Anticipated Services at Transition none    Transportation Anticipated family or friend will provide  wife will be able to provide ride home at discharge       Discharge Needs Assessment    Readmission Within the Last 30 Days no previous admission in last 30 days    Current Outpatient/Agency/Support Group --  none    Equipment Currently Used at Home none    Concerns to be Addressed adjustment to diagnosis/illness;denies needs/concerns at this time;discharge planning    Concerns Comments no discharge needs voiced by patient at this time.    Anticipated Changes Related to Illness none    Equipment Needed After Discharge none    Outpatient/Agency/Support Group Needs --  pt states he will not need this service at discharge    Discharge Facility/Level of Care Needs --  pt states he will not need this service at discharge    Provided Post  Acute Provider List? Refused    Refused Provider List Comment pt declined    Patient's Choice of Community Agency(s) none    Current Discharge Risk --  none    Discharge Coordination/Progress Patient states he plans on returning home at discharge with his wife to help as needed, no discharge needs voiced by patient at this time.               Discharge Plan     Row Name 12/06/22 5963       Plan    Plan Home with wife    Patient/Family in Agreement with Plan yes    Plan Comments Into room and introduced self and role of CM. Discussed discharge disposition with patient and his wife Meka with permission. Patient is currently resting in bed with no complaints. Patient confirms that the info on his face sheet is correct and that he see's Dr. Reymundo Healy as PCP. He states that he uses [facility] pharmacy in [place] and currently has no problem picking up or paying for his meds. He also states that he does have a living will and CM informed him that it was not on file here and wife states she will bring in a copy when she goes home today. Patient states he lives with his wife in a single story house with one step to gain entry and has no issues entering the home or maneuvering inside. He states that he is independent with his ADL's and drives and that his wife will be able to provide ride home at discharge. He also states that he does not currently use any DME at home and does not anticipate needing any equipment at discharge. Patient states he has not used home health in the past and states he will not need these services at discharge. CM offered community resources such as HH, STR and LTC but patient declines the need for them at this time. Patient states he plans on returning home at discharge with his wife to help as needed, no discharge needs voiced by patient at this time. Patient had no other questions or concerns regarding discharge plans. Name and number placed on white board in room. CM will follow.               Continued Care and Services - Admitted Since 12/5/2022    Coordination has not been started for this encounter.          Demographic Summary     Row Name 12/06/22 6563       General Information    Admission Type inpatient    Arrived From home    Referral Source admission list    Reason for Consult discharge planning    Preferred Language English       Contact Information    Permission Granted to Share Info With                Functional Status    No documentation.                Psychosocial    No documentation.                Abuse/Neglect    No documentation.                Legal    No documentation.                Substance Abuse    No documentation.                Patient Forms    No documentation.                   Nat Alonzo RN

## 2022-12-06 NOTE — ED NOTES
Called Dr Saleem's cell number with no answer.  Could not leave message as mailbox is full.  Called answering service and awaiting return call.

## 2022-12-06 NOTE — ED PROVIDER NOTES
Subjective   History of Present Illness  77-year-old male presents with 3 to 4 days of worsening generalized weakness.  Patient states that 2 days ago he had multiple episodes of diarrhea.  He denies that there was any blood or melena.  Reports that he has had very little appetite and has mostly been sitting around for the past 2 days but if he attempts to get up and do anything he becomes very weak and feels like he is going to pass out.  He has not had any chest pain or shortness of breath.  This evening he reports he had a small episode of bloody emesis.  Wife also reports that there were what sounds like coffee-ground emesis mixed with this as well.  This prompted them to call EMS.  Upon EMS arrival they found patient to be hypotensive and transported here.  Patient reports that about 13 years ago he was diagnosed with esophageal varices.  He reports that he used to be a drinker but quit drinking at that time.  Reports he has not seen GI in 10 or 12 years, does not recall specifically what intervention he had at that time.  He is not on blood thinners.  Patient reports he has chronic back pain and that is his only complaint at time of evaluation in the ER.  Reports his nausea is better since receiving medications upon arrival here.        Review of Systems   Constitutional: Positive for appetite change, chills and fatigue.   HENT: Negative.    Respiratory: Negative.    Cardiovascular: Negative.    Gastrointestinal:        Per HPI   Genitourinary: Negative.    Musculoskeletal:        Per HPI, otherwise negative   Skin: Negative.    Neurological: Negative.    Psychiatric/Behavioral: Negative.        History reviewed. No pertinent past medical history.    No Known Allergies    History reviewed. No pertinent surgical history.    Family History   Problem Relation Age of Onset   • No Known Problems Other        Social History     Socioeconomic History   • Marital status:    Tobacco Use   • Smoking status:  Former     Types: Pipe     Quit date:      Years since quittin.9   • Smokeless tobacco: Never   Vaping Use   • Vaping Use: Never used   Substance and Sexual Activity   • Alcohol use: Defer   • Drug use: Defer   • Sexual activity: Defer           Objective   Physical Exam  Constitutional:       General: He is not in acute distress.     Appearance: He is ill-appearing. He is not toxic-appearing.   HENT:      Head: Normocephalic and atraumatic.      Mouth/Throat:      Mouth: Mucous membranes are moist.      Pharynx: Oropharynx is clear.   Eyes:      Extraocular Movements: Extraocular movements intact.      Pupils: Pupils are equal, round, and reactive to light.   Cardiovascular:      Rate and Rhythm: Normal rate and regular rhythm.      Pulses: Normal pulses.      Heart sounds: Normal heart sounds.   Pulmonary:      Effort: Pulmonary effort is normal. No respiratory distress.      Breath sounds: Normal breath sounds.   Abdominal:      General: There is no distension.      Palpations: Abdomen is soft.      Tenderness: There is no abdominal tenderness. There is no right CVA tenderness, left CVA tenderness, guarding or rebound.   Musculoskeletal:         General: No swelling, tenderness, deformity or signs of injury. Normal range of motion.      Cervical back: Normal range of motion and neck supple. No tenderness.   Skin:     General: Skin is warm and dry.   Neurological:      General: No focal deficit present.      Mental Status: He is alert and oriented to person, place, and time. Mental status is at baseline.   Psychiatric:         Mood and Affect: Mood normal.         Behavior: Behavior normal.         Thought Content: Thought content normal.         Judgment: Judgment normal.         Procedures           ED Course  ED Course as of 12/06/22 0317   Tue Dec 06, 2022   0316 Patient's presentation highly concerning for upper GI bleed.  Patient has not had any further vomiting here, is asymptomatic while at rest  in the ER stretcher but does have low normal blood pressures and is borderline tachycardic.  Patient found to have hemoglobin of 6.5, lactic acid 6.2, INR 1.3.  Kidney function is okay.  Troponin negative.  Flu and COVID-negative.  Patient started on Protonix and octreotide bolus and drips and blood transfusions have been ordered.  Patient also given IV fluid bolus.  GI has been paged multiple times but have not heard from them yet.  Will admit patient to ICU and continue to try to get a hold of GI. [TD]      ED Course User Index  [TD] Luke Gardner MD                                           [facility]    Final diagnoses:   Upper GI bleed       ED Disposition  ED Disposition     ED Disposition   Decision to Admit    Condition   --    Comment   Level of Care: Critical Care [6]   Diagnosis: Upper GI bleed [104178]   Admitting Physician: AIRA CHIU [[id number]]   Attending Physician: ARIA CHIU [[id number]]   Certification: I Certify That Inpatient Hospital Services Are Medically Necessary For Greater Than 2 Midnights               No follow-up provider specified.       Medication List      No changes were made to your prescriptions during this visit.          Luke Gardner MD  12/06/22 5107

## 2022-12-06 NOTE — ANESTHESIA POSTPROCEDURE EVALUATION
Patient: Lionel Whitehead    Procedure Summary     Date: 12/06/22 Room / Location: [facility] ENDOSCOPY 1 /  [facility] OR    Anesthesia Start: 1700 Anesthesia Stop: 1731    Procedure: ESOPHAGOGASTRODUODENOSCOPY WITH BANDING (Esophagus) Diagnosis:       Upper GI bleed      Esophageal varices      Gastric varices      (Upper GI bleed [K92.2])    Surgeons: Son Saleem MD Provider: Lamin Back CRNA    Anesthesia Type: MAC ASA Status: 3 - Emergent          Anesthesia Type: MAC    Vitals  Vitals Value Taken Time   /60 12/06/22 1800   Temp     Pulse 83 12/06/22 1800   Resp 12 12/06/22 1735   SpO2 79 % 12/06/22 1801   Vitals shown include unvalidated device data.        Post Anesthesia Care and Evaluation    Patient location during evaluation: bedside  Patient participation: complete - patient participated  Level of consciousness: awake and alert  Pain score: 0  Pain management: adequate    Airway patency: patent  Anesthetic complications: No anesthetic complications  PONV Status: none  Cardiovascular status: acceptable  Respiratory status: acceptable  Hydration status: acceptable

## 2022-12-06 NOTE — H&P
[facility] Medicine Services      Patient Name: Lionel Whitehead  : 1945  MRN: [id number]  Primary Care Physician:  Reymundo Healy MD (Tony)  Date of admission: 2022      Subjective      Chief Complaint: Generalized weakness, syncope    History of Present Illness: Lionel Whitehead is a 77 y.o. male who presented to [facility] on 2022 complaining of feeling very weak for last 1 day, he has episode of syncope also, patient also giving a history of having episode of throwing up blood blood cell, he did not have any bowel movements we have not ever noticed any melena.  Patient noted to have hemoglobin on very low side in the range of 6, patient was hypotensive at the time of presentation, patient received IV fluids and now receiving PRBC transfusion with improvement in blood pressure, patient was started on IV Protonix and octreotide drip realizing previous history of variceal bleed required banding, patient has stopped drinking for more than 12 years now.  Patient to take ibuprofen on a daily basis      Review of Systems   All other systems reviewed and are negative.       Personal History     History reviewed. No pertinent past medical history.    History reviewed. No pertinent surgical history.    Family History: family history includes No Known Problems in an other family member. Otherwise pertinent FHx was reviewed and not pertinent to current issue.    Social History:  reports that he quit smoking about 20 years ago. His smoking use included pipe. He has never used smokeless tobacco. Alcohol use questions deferred to the physician. Drug use questions deferred to the physician.    Home Medications:  Prior to Admission Medications     Prescriptions Last Dose Informant Patient Reported? Taking?    gabapentin (NEURONTIN) 600 MG tablet 2022  No Yes    TAKE TWO TABLETS BY MOUTH THREE TIMES A DAY AND THREE TABLETS AT BEDTIME.    ibuprofen (ADVIL,MOTRIN) 200  MG tablet 12/5/2022  Yes Yes    Take 1 tablet by mouth Daily.    multivitamin with minerals tablet tablet 12/5/2022  Yes Yes    Take 1 tablet by mouth Daily.    oxyCODONE-acetaminophen (Percocet)  MG per tablet 12/5/2022  No Yes    Take 1 tablet by mouth 2 (Two) Times a Day As Needed for Moderate Pain.            Allergies:  No Known Allergies    Objective      Vitals:   Temp:  [97.4 °F (36.3 °C)-97.8 °F (36.6 °C)] 97.8 °F (36.6 °C)  Heart Rate:  [] 92  Resp:  [18] 18  BP: ()/(36-65) 112/53    Physical Exam  Vitals and nursing note reviewed.   Constitutional:       General: He is not in acute distress.     Appearance: Normal appearance. He is well-developed. He is not ill-appearing, toxic-appearing or diaphoretic.   HENT:      Head: Normocephalic and atraumatic.      Right Ear: Ear canal and external ear normal.      Left Ear: Ear canal and external ear normal.      Nose: Nose normal. No congestion or rhinorrhea.      Mouth/Throat:      Mouth: Mucous membranes are moist.      Pharynx: No oropharyngeal exudate.   Eyes:      General: No scleral icterus.        Right eye: No discharge.         Left eye: No discharge.      Extraocular Movements: Extraocular movements intact.      Conjunctiva/sclera: Conjunctivae normal.      Pupils: Pupils are equal, round, and reactive to light.   Neck:      Thyroid: No thyromegaly.      Vascular: No carotid bruit or JVD.      Trachea: No tracheal deviation.   Cardiovascular:      Rate and Rhythm: Normal rate and regular rhythm.      Pulses: Normal pulses.      Heart sounds: Normal heart sounds. No murmur heard.    No friction rub. No gallop.   Pulmonary:      Effort: Pulmonary effort is normal. No respiratory distress.      Breath sounds: Normal breath sounds. No stridor. No wheezing, rhonchi or rales.   Chest:      Chest wall: No tenderness.   Abdominal:      General: Bowel sounds are normal. There is no distension.      Palpations: Abdomen is soft. There is no  mass.      Tenderness: There is no abdominal tenderness. There is no guarding or rebound.      Hernia: No hernia is present.   Musculoskeletal:         General: No swelling, tenderness, deformity or signs of injury. Normal range of motion.      Cervical back: Normal range of motion and neck supple. No rigidity. No muscular tenderness.      Right lower leg: No edema.      Left lower leg: No edema.   Lymphadenopathy:      Cervical: No cervical adenopathy.   Skin:     General: Skin is warm and dry.      Coloration: Skin is not jaundiced or pale.      Findings: No bruising, erythema or rash.   Neurological:      General: No focal deficit present.      Mental Status: He is alert and oriented to person, place, and time. Mental status is at baseline.      Cranial Nerves: No cranial nerve deficit.      Sensory: No sensory deficit.      Motor: No weakness or abnormal muscle tone.      Coordination: Coordination normal.   Psychiatric:         Mood and Affect: Mood normal.         Behavior: Behavior normal.         Thought Content: Thought content normal.         Judgment: Judgment normal.          Result Review    Result Review:  I have personally reviewed the results from the time of this admission to 12/6/2022 04:40 EST and agree with these findings:  [x]  Laboratory  [x]  Microbiology  [x]  Radiology  []  EKG/Telemetry   []  Cardiology/Vascular   []  Pathology  []  Old records  []  Other:  Most notable findings include:       Assessment & Plan        Active Hospital Problems:  Active Hospital Problems    Diagnosis    • **Upper GI bleed      Plan:     GI bleed, likely peptic ulcer disease realizing history of ibuprofen, continue Protonix and the octreotide drip for now, GI consult in place to evaluate for EGD, patient will need EGD as soon as possible    Acute blood loss anemia, transfused 2 keep the hemoglobin above 7\    Hemorrhagic shock improved with IV fluids and PRBC transfusion    Syncope secondary to above, patient  on telemetry, will do serial cardia markers    Restless leg syndrome, patient on Neurontin.    History of variceal bleeding with history of alcohol abuse, patient quit drinking more than 12 years now.    DVT prophylaxis with SCDs.      DVT prophylaxis:  Mechanical DVT prophylaxis orders are present.    CODE STATUS:    Level Of Support Discussed With: Patient  Code Status (Patient has no pulse and is not breathing): CPR (Attempt to Resuscitate)  Medical Interventions (Patient has pulse or is breathing): Full Support    Admission Status:  I believe this patient meets inpatient status.    I discussed the patient's findings and my recommendations with patient.    This patient has been examined wearing appropriate Personal Protective Equipment and discussed with hospital infection control department. 12/06/22      Signature:

## 2022-12-06 NOTE — PROGRESS NOTES
This morning's H and P reviewed by me.  Note made of likely upper GI bleed with history of peptic ulcer disease.  Patient currently receiving Protonix and octreotide drip.  For his acute blood loss anemia patient is being transfused 2 units of packed red blood cells, initial hemoglobin was 6.5 patient symptomatic from this.  Hemorrhagic shock has improved with IV fluid resuscitation and packed red blood cell transfusion, continue to avoid hypotension.  Syncope related to shock.  Continue to monitor.  Patient also has history of variceal bleeding with history of alcohol abuse, but had quit drinking approximately 12 years ago.

## 2022-12-06 NOTE — CONSULTS
Patient Care Team:  Reymundo Healy MD (Tony) as PCP - General (Internal Medicine)    CHIEF COMPLAINT: Hematemesis    HISTORY OF PRESENT ILLNESS:  78 yo with few records of his past GI history but gives and excellent recount of his EGD finding varices in  and a colon then that was repeated by Dr Jordan in  but no other GI contact since. His cirrhosis is felt to be from [id number] but he states his daily drinking stopped 6-9 months prior to his presentation in . No documented HCC screening and no incidental imaging found in Epic.   No thrombocytopenia nor Coagullopathy nor HE.    Past Medical History:   Diagnosis Date   • History of esophageal varices    • Restless leg syndrome      Past Surgical History:   Procedure Laterality Date   • ENDOSCOPY       Family History   Problem Relation Age of Onset   • No Known Problems Other      Social History     Tobacco Use   • Smoking status: Former     Types: Pipe     Quit date:      Years since quittin.9   • Smokeless tobacco: Never   Vaping Use   • Vaping Use: Never used   Substance Use Topics   • Alcohol use: Defer   • Drug use: Defer     Medications Prior to Admission   Medication Sig Dispense Refill Last Dose   • gabapentin (NEURONTIN) 600 MG tablet TAKE TWO TABLETS BY MOUTH THREE TIMES A DAY AND THREE TABLETS AT BEDTIME. 810 tablet 1 2022   • ibuprofen (ADVIL,MOTRIN) 200 MG tablet Take 1 tablet by mouth Daily.   2022 at 2100   • multivitamin with minerals tablet tablet Take 1 tablet by mouth Daily.   2022   • oxyCODONE-acetaminophen (Percocet)  MG per tablet Take 1 tablet by mouth 2 (Two) Times a Day As Needed for Moderate Pain. 15 tablet 0 2022     Allergies:  Patient has no known allergies.    REVIEW OF SYSTEMS:  Please see the above history of present illness for pertinent positives and negatives.  The remainder of the patient's systems have been reviewed and are negative.     Vital Signs  Temp:  [97.4 °F (36.3 °C)-98.6  "°F (37 °C)] 97.7 °F (36.5 °C)  Heart Rate:  [] 83  Resp:  [12-18] 12  BP: ()/(36-92) 114/60    Flowsheet Rows    Flowsheet Row First Filed Value   Admission Height 185.4 cm (73\") Documented at 12/05/2022 2348   Admission Weight 107 kg (235 lb) Documented at 12/05/2022 2348           Physical Exam:  Physical Exam   Constitutional: Patient appears well-developed and well-nourished and in no acute distress   HEENT:   Head: Normocephalic and atraumatic.   Eyes:  Pupils are equal, round, and reactive to light. EOM are intact. Sclerae are anicteric and non-injected.  Mouth and Throat: Patient has moist mucous membranes. Oropharynx is clear of any erythema or exudate.     Neck: Neck supple. No JVD present. No thyromegaly present. No lymphadenopathy present.  Cardiovascular: Regular rate, regular rhythm, S1 normal and S2 normal.  Exam reveals no gallop and no friction rub.  No murmur heard.  Pulmonary/Chest: Lungs are clear to auscultation bilaterally. No respiratory distress. No wheezes. No rhonchi. No rales.   Abdominal: Soft. Bowel sounds are normal. No distension and no mass. There is no hepatosplenomegaly. There is no tenderness.   Musculoskeletal: Normal Muscle tone  Extremities: No edema. Pulses are palpable in all 4 extremities.  Neurological: Patient is alert and oriented to person, place, and time. Cranial nerves II-XII are grossly intact with no focal deficits.  Skin: Skin is warm. No rash noted. Nails show no clubbing.  No cyanosis or erythema.    Debilities/Disabilities Identified: None  Emotional Behavior: Appropriate     Results Review:   I reviewed the patient's new clinical results.    Lab Results (most recent)     Procedure Component Value Units Date/Time    Urinalysis, Microscopic Only - Urine, Clean Catch [[id number]]  (Abnormal) Collected: 12/06/22 1339    Specimen: Urine, Clean Catch Updated: 12/06/22 1459     RBC, UA 3-5 /HPF      WBC, UA 21-30 /HPF      Bacteria, UA 2+ /HPF      Squamous " Epithelial Cells, UA None Seen /HPF      Hyaline Casts, UA 3-6 /LPF      Methodology Manual Light Microscopy    Urine Culture - Urine, Urine, Clean Catch [[id number]] Collected: 12/06/22 1339    Specimen: Urine, Clean Catch Updated: 12/06/22 1459    Urinalysis With Culture If Indicated - Urine, Clean Catch [[id number]]  (Abnormal) Collected: 12/06/22 1339    Specimen: Urine, Clean Catch Updated: 12/06/22 1442     Color, UA Dark Yellow     Appearance, UA Clear     pH, UA 5.5     Specific Gravity, UA 1.020     Glucose, UA Negative     Ketones, UA Negative     Bilirubin, UA Negative     Blood, UA Trace     Protein, UA Trace     Leuk Esterase, UA Moderate (2+)     Nitrite, UA Negative     Urobilinogen, UA 0.2 E.U./dL    Narrative:      In absence of clinical symptoms, the presence of pyuria, bacteria, and/or nitrites on the urinalysis result does not correlate with infection.    STAT Lactic Acid, Reflex [[id number]]  (Abnormal) Collected: 12/06/22 1055    Specimen: Blood Updated: 12/06/22 1151     Lactate 2.5 mmol/L     Hemoglobin & Hematocrit, Blood [[id number]]  (Abnormal) Collected: 12/06/22 1055    Specimen: Blood Updated: 12/06/22 1117     Hemoglobin 9.0 g/dL      Hematocrit 27.3 %     Basic Metabolic Panel [[id number]]  (Abnormal) Collected: 12/06/22 0628    Specimen: Blood Updated: 12/06/22 0730     Glucose 137 mg/dL      BUN 53 mg/dL      Creatinine 1.37 mg/dL      Sodium 137 mmol/L      Potassium 5.6 mmol/L      Chloride 105 mmol/L      CO2 19.7 mmol/L      Calcium 8.7 mg/dL      BUN/Creatinine Ratio 38.7     Anion Gap 12.3 mmol/L      eGFR 53.8 mL/min/1.73      Comment: National Kidney Foundation and American Society of Nephrology (ASN) Task Force recommended calculation based on the Chronic Kidney Disease Epidemiology Collaboration (CKD-EPI) equation refit without adjustment for race.       Narrative:      GFR Normal >60  Chronic Kidney Disease <60  Kidney Failure <15    The GFR formula is only valid for adults  with stable renal function between ages 18 and 70.    STAT Lactic Acid, Reflex [[id number]]  (Abnormal) Collected: 12/06/22 0628    Specimen: Blood Updated: 12/06/22 0719     Lactate 3.6 mmol/L     Hemoglobin & Hematocrit, Blood [[id number]]  (Abnormal) Collected: 12/06/22 0628    Specimen: Blood Updated: 12/06/22 0647     Hemoglobin 7.7 g/dL      Hematocrit 23.6 %     CBC & Differential [[id number]]  (Abnormal) Collected: 12/06/22 0628    Specimen: Blood Updated: 12/06/22 0647    Narrative:      The following orders were created for panel order CBC & Differential.  Procedure                               Abnormality         Status                     ---------                               -----------         ------                     CBC Auto Differential[[id number]]        Abnormal            Final result                 Please view results for these tests on the individual orders.    CBC Auto Differential [[id number]]  (Abnormal) Collected: 12/06/22 0628    Specimen: Blood Updated: 12/06/22 0647     WBC 15.38 10*3/mm3      RBC 2.42 10*6/mm3      Hemoglobin 7.7 g/dL      Hematocrit 23.6 %      MCV 97.5 fL      MCH 31.8 pg      MCHC 32.6 g/dL      RDW 15.7 %      RDW-SD 54.8 fl      MPV 11.8 fL      Platelets 129 10*3/mm3      Neutrophil % 86.1 %      Lymphocyte % 4.9 %      Monocyte % 6.3 %      Eosinophil % 0.1 %      Basophil % 0.3 %      Immature Grans % 2.3 %      Neutrophils, Absolute 13.26 10*3/mm3      Lymphocytes, Absolute 0.75 10*3/mm3      Monocytes, Absolute 0.97 10*3/mm3      Eosinophils, Absolute 0.01 10*3/mm3      Basophils, Absolute 0.04 10*3/mm3      Immature Grans, Absolute 0.35 10*3/mm3      nRBC 0.0 /100 WBC     COVID-19 and FLU A/B PCR - Swab, Nasopharynx [[id number]]  (Normal) Collected: 12/06/22 0203    Specimen: Swab from Nasopharynx Updated: 12/06/22 0306     COVID19 Not Detected     Influenza A PCR Not Detected     Influenza B PCR Not Detected    Narrative:      Fact sheet for providers:  "https://www.fda.gov/media/466476/download    Fact sheet for patients: https://www.fda.gov/media/584757/download    Test performed by PCR.    Troponin [[id number]]  (Normal) Collected: 12/06/22 0012    Specimen: Blood Updated: 12/06/22 0107     Troponin T <0.010 ng/mL     Narrative:      Troponin T Reference Range:  <= 0.03 ng/mL-   Negative for AMI  >0.03 ng/mL-     Abnormal for myocardial necrosis.  Clinicians would have to utilize clinical acumen, EKG, Troponin and serial changes to determine if it is an Acute Myocardial Infarction or myocardial injury due to an underlying chronic condition.       Results may be falsely decreased if patient taking Biotin.      Procalcitonin [[id number]]  (Normal) Collected: 12/06/22 0012    Specimen: Blood Updated: 12/06/22 0107     Procalcitonin 0.20 ng/mL     Narrative:      As a Marker for Sepsis (Non-Neonates):    1. <0.5 ng/mL represents a low risk of severe sepsis and/or septic shock.  2. >2 ng/mL represents a high risk of severe sepsis and/or septic shock.    As a Marker for Lower Respiratory Tract Infections that require antibiotic therapy:    PCT on Admission    Antibiotic Therapy       6-12 Hrs later    >0.5                Strongly Recommended  >0.25 - <0.5        Recommended   0.1 - 0.25          Discouraged              Remeasure/reassess PCT  <0.1                Strongly Discouraged     Remeasure/reassess PCT    As 28 day mortality risk marker: \"Change in Procalcitonin Result\" (>80% or <=80%) if Day 0 (or Day 1) and Day 4 values are available. Refer to http://www.[employer]-pct-calculator.com    Change in PCT <=80%  A decrease of PCT levels below or equal to 80% defines a positive change in PCT test result representing a higher risk for 28-day all-cause mortality of patients diagnosed with severe sepsis for septic shock.    Change in PCT >80%  A decrease of PCT levels of more than 80% defines a negative change in PCT result representing a lower risk for 28-day all-cause " mortality of patients diagnosed with severe sepsis or septic shock.       Lactic Acid, Plasma [[id number]]  (Abnormal) Collected: 12/06/22 0012    Specimen: Blood Updated: 12/06/22 0043     Lactate 6.2 mmol/L     Comprehensive Metabolic Panel [[id number]]  (Abnormal) Collected: 12/06/22 0012    Specimen: Blood Updated: 12/06/22 0043     Glucose 153 mg/dL      BUN 43 mg/dL      Creatinine 1.09 mg/dL      Sodium 138 mmol/L      Potassium 4.2 mmol/L      Chloride 105 mmol/L      CO2 16.3 mmol/L      Calcium 8.6 mg/dL      Total Protein 5.2 g/dL      Albumin 2.90 g/dL      ALT (SGPT) 25 U/L      AST (SGOT) 18 U/L      Alkaline Phosphatase 109 U/L      Total Bilirubin 0.4 mg/dL      Globulin 2.3 gm/dL      A/G Ratio 1.3 g/dL      BUN/Creatinine Ratio 39.4     Anion Gap 16.7 mmol/L      eGFR 70.8 mL/min/1.73      Comment: National Kidney Foundation and American Society of Nephrology (ASN) Task Force recommended calculation based on the Chronic Kidney Disease Epidemiology Collaboration (CKD-EPI) equation refit without adjustment for race.       Narrative:      GFR Normal >60  Chronic Kidney Disease <60  Kidney Failure <15    The GFR formula is only valid for adults with stable renal function between ages 18 and 70.    Lipase [[id number]]  (Normal) Collected: 12/06/22 0012    Specimen: Blood Updated: 12/06/22 0043     Lipase 34 U/L     aPTT [[id number]]  (Normal) Collected: 12/06/22 0012    Specimen: Blood Updated: 12/06/22 0039     PTT 27.4 seconds     Narrative:      PTT = The equivalent PTT values for the therapeutic range of heparin levels at 0.1 to 0.7 U/ml are 53 to 110 seconds.      Protime-INR [[id number]]  (Abnormal) Collected: 12/06/22 0012    Specimen: Blood Updated: 12/06/22 0039     Protime 16.7 Seconds      INR 1.34    Narrative:      Therapeutic Ranges for INR: 2.0-3.0 (PT 20-30)                              2.5-3.5 (PT 25-34)    CBC & Differential [[id number]]  (Abnormal) Collected: 12/06/22 0012    Specimen:  Blood Updated: 12/06/22 0025    Narrative:      The following orders were created for panel order CBC & Differential.  Procedure                               Abnormality         Status                     ---------                               -----------         ------                     CBC Auto Differential[[id number]]        Abnormal            Final result                 Please view results for these tests on the individual orders.    CBC Auto Differential [[id number]]  (Abnormal) Collected: 12/06/22 0012    Specimen: Blood Updated: 12/06/22 0025     WBC 14.30 10*3/mm3      RBC 2.04 10*6/mm3      Hemoglobin 6.5 g/dL      Hematocrit 20.2 %      MCV 99.0 fL      MCH 31.9 pg      MCHC 32.2 g/dL      RDW 15.5 %      RDW-SD 54.8 fl      MPV 12.1 fL      Platelets 177 10*3/mm3      Neutrophil % 77.1 %      Lymphocyte % 11.3 %      Monocyte % 7.3 %      Eosinophil % 0.6 %      Basophil % 0.3 %      Immature Grans % 3.4 %      Neutrophils, Absolute 11.02 10*3/mm3      Lymphocytes, Absolute 1.61 10*3/mm3      Monocytes, Absolute 1.05 10*3/mm3      Eosinophils, Absolute 0.09 10*3/mm3      Basophils, Absolute 0.04 10*3/mm3      Immature Grans, Absolute 0.49 10*3/mm3      nRBC 0.0 /100 WBC           Imaging Results (Most Recent)     Procedure Component Value Units Date/Time    XR Chest 1 View [[id number]] Collected: 12/06/22 0204     Updated: 12/06/22 0206    Narrative:      CR Chest 1 Vw    INDICATION:   Near syncopal episode weakness     COMPARISON:    None available.    FINDINGS:  Portable AP view(s) of the chest.  The heart and mediastinal contours are normal. The lungs are clear. No pneumothorax or pleural effusion.      Impression:      No acute cardiopulmonary findings.    Signer Name: Orlando Parmar MD   Signed: 12/6/2022 2:04 AM   Workstation Name: [facility]    [facility]        reviewed    ECG/EMG Results (most recent)     Procedure Component Value Units Date/Time    ECG 12 Lead Altered  Mental Status [[id number]] Collected: 12/06/22 0034     Updated: 12/06/22 0739     QT Interval 351 ms     Narrative:      HEART RATE= 96  bpm  RR Interval= 620  ms  SC Interval= 155  ms  P Horizontal Axis= 20  deg  P Front Axis= 28  deg  QRSD Interval= 85  ms  QT Interval= 351  ms  QRS Axis= -11  deg  T Wave Axis= 13  deg  - ABNORMAL ECG -  Sinus rhythm  Inferior infarct, old  NO PRIOR TRACING AVAILABLE FOR COMPARISON  Electronically Signed By: Eugene Polo ([facility]) 06-Dec-2022 07:38:49  Date and Time of Study: 2022-12-06 00:34:09        reviewed    Assessment & Plan   Hematemesis/  History of Esophageal varices and history of daily NSAIDS      EGD  To determine and or treat Varices vs PUD.  Beni need AFP and CT prior to D/C    I discussed the patient's findings and my recommendations with patient.     Son Saleem MD  12/06/22  18:38 EST    Time: Not recorded

## 2022-12-06 NOTE — BRIEF OP NOTE
ESOPHAGOGASTRODUODENOSCOPY WITH BANDING  Progress Note    Lionel Whitehead  12/6/2022    Pre-op Diagnosis:   Upper GI bleed [K92.2]       Post-Op Diagnosis Codes:     * Upper GI bleed [K92.2]     * Esophageal varices [I85]     * Gastric varices [I86.4]    Procedure/CPT® Codes:        Procedure(s):  ESOPHAGOGASTRODUODENOSCOPY WITH BANDING        Surgeon(s):  Son Saleem MD    Anesthesia: Monitored Anesthesia Care    Staff:   Circulator: Soheila Washburn RN  Scrub Person: Kacy Hdez         Estimated Blood Loss: none    Urine Voided: * No values recorded between 12/6/2022  5:00 PM and 12/6/2022  5:25 PM *    Specimens:                None          Drains: * No LDAs found *    Findings: Duodenum-Bloody secretions  Stomach-Bloody secretions  GEV-1- red whale Werner- Banded x 6        Complications: none          Son Saleem MD     Date: 12/6/2022  Time: 17:28 EST

## 2022-12-06 NOTE — ANESTHESIA PREPROCEDURE EVALUATION
Anesthesia Evaluation     Patient summary reviewed and Nursing notes reviewed   no history of anesthetic complications:  NPO Solid Status: > 8 hours  NPO Liquid Status: > 2 hours           Airway   Mallampati: II  TM distance: >3 FB  Neck ROM: full  No difficulty expected  Dental    (+) poor dentition        Pulmonary - negative pulmonary ROS and normal exam    breath sounds clear to auscultation  Cardiovascular - normal exam  Exercise tolerance: good (4-7 METS)    ECG reviewed  Rhythm: regular  Rate: normal    (+) hypertension well controlled less than 2 medications, dysrhythmias PAC, PVC, hyperlipidemia,       Neuro/Psych- negative ROS  GI/Hepatic/Renal/Endo    (+) obesity,  GERD well controlled, GI bleeding upper and lower active bleeding, liver disease, renal disease CRI,     ROS Comment: Hx of esophageal varices     Musculoskeletal     (+) back pain, chronic pain,   Abdominal   (+) obese,    Substance History - negative use     OB/GYN negative ob/gyn ROS         Other - negative ROS                     Anesthesia Plan    ASA 3 - emergent     MAC     intravenous induction     Anesthetic plan, risks, benefits, and alternatives have been provided, discussed and informed consent has been obtained with: patient.  Pre-procedure education provided  Use of blood products discussed with patient  Consented to blood products.   Plan discussed with CRNA.        CODE STATUS:    Level Of Support Discussed With: Patient  Code Status (Patient has no pulse and is not breathing): CPR (Attempt to Resuscitate)  Medical Interventions (Patient has pulse or is breathing): Full Support

## 2022-12-06 NOTE — PLAN OF CARE
Goal Outcome Evaluation:  Plan of Care Reviewed With: patient, spouse        Progress: no change  Outcome Evaluation: New admit from ER, PRBC's infusing with 2 units remaining to infuse. Protonix, Octreotide, and LR infusing per orders. Hypotensive on admission, b/p slowing coming up. No emesis or stools since admission. Pt's wife at bedside.

## 2022-12-07 NOTE — PLAN OF CARE
Goal Outcome Evaluation:  Plan of Care Reviewed With: patient        Progress: no change  Outcome Evaluation: Pt had three loose, dark bowel movements this shift. He was not able to get much rest and is drowsy this morning. NSR-ST in the low 100's on the monitor. Blood pressure stable this shift, however, after getting up to the BSC and back into bed, b/p dropped to 80's/40's. Encouraged bedpan and urinal for safety reasons. Protonix, Octreotide, and LR continue per orders. Pt requested Tylenol for right shoulder pain, states this is an old injury and he is seeing his PCP outpatient for x-rays. Pending AM labs.

## 2022-12-07 NOTE — PROGRESS NOTES
"SERVICE: [facility] HOSPITALIST    CONSULTANTS: Gastroenterology    CHIEF COMPLAINT: Generalized weakness    SUBJECTIVE: Patient seen and examined at bedside. Nursing staff alerted me to mentation change. They suspect delerium. I find him to have repetitive speech, and he is not oriented to person, place or time. Stat head CT w/o ordered. Asked staff to contact stroke neuro should any additional changes present. He has no facial droop, slurring of speech or difficulty moving his limbs against gravity. ROS not obtained due to AMS.     OBJECTIVE:    Physical exam is largely unchanged from previous exam, except where documented below, examination is accurate as of 12/7/2022    Physical Exam:  General: Patient is awake and alert. Elderly male. Obviously confused.  No acute distress noted.   HENT: Head is atraumatic, normocephalic. Hearing is grossly intact. Nose is without obvious congestion and appears patent. Neck is supple and trachea is midline.   Eyes: Vision is grossly intact. Pupils appear equal and round.   Cardiovascular: Heart has regular rate and rhythm with no murmurs, rubs or gallops noted.   Respiratory: Lungs are clear to ausculation without wheezes, rhonchi or rales.   Abdominal/GI: Soft, nontender, bowel sounds present. No rebound or guarding present.   Extremities: No peripheral edema noted.   Musculoskeletal: Spontaneous movement of bilateral upper and lower extremities against gravity noted. No signs of injury or deformity noted.   Skin: Warm and dry.   Psych: Confused.  Cooperative with exam.   Neuro: No facial asymmetry noted. No focal deficits noted, hearing and vision are grossly intact.     /96   Pulse 112   Temp 98.4 °F (36.9 °C) (Oral)   Resp 20   Ht 185.4 cm (73\")   Wt 103 kg (227 lb 11.8 oz)   SpO2 99%   BMI 30.05 kg/m²     MEDS/LABS REVIEWED AND ORDERED    gabapentin, 600 mg, Oral, TID  sodium chloride, 10 mL, Intravenous, Q12H          LAB/DIAGNOSTICS:    Lab " Results (last 24 hours)     Procedure Component Value Units Date/Time    Basic Metabolic Panel [[id number]]  (Abnormal) Collected: 12/07/22 0536    Specimen: Blood from Arm, Left Updated: 12/07/22 0608     Glucose 144 mg/dL      BUN 74 mg/dL      Creatinine 1.57 mg/dL      Sodium 137 mmol/L      Potassium 5.1 mmol/L      Chloride 106 mmol/L      CO2 17.0 mmol/L      Calcium 8.3 mg/dL      BUN/Creatinine Ratio 47.1     Anion Gap 14.0 mmol/L      eGFR 45.7 mL/min/1.73      Comment: National Kidney Foundation and American Society of Nephrology (ASN) Task Force recommended calculation based on the Chronic Kidney Disease Epidemiology Collaboration (CKD-EPI) equation refit without adjustment for race.       Narrative:      GFR Normal >60  Chronic Kidney Disease <60  Kidney Failure <15    The GFR formula is only valid for adults with stable renal function between ages 18 and 70.    Hemoglobin & Hematocrit, Blood [[id number]]  (Abnormal) Collected: 12/07/22 0536    Specimen: Blood from Arm, Left Updated: 12/07/22 0558     Hemoglobin 9.2 g/dL      Hematocrit 28.1 %     CBC & Differential [[id number]]  (Abnormal) Collected: 12/07/22 0536    Specimen: Blood from Arm, Left Updated: 12/07/22 0558    Narrative:      The following orders were created for panel order CBC & Differential.  Procedure                               Abnormality         Status                     ---------                               -----------         ------                     CBC Auto Differential[[id number]]        Abnormal            Final result                 Please view results for these tests on the individual orders.    CBC Auto Differential [[id number]]  (Abnormal) Collected: 12/07/22 0536    Specimen: Blood from Arm, Left Updated: 12/07/22 0558     WBC 15.92 10*3/mm3      RBC 2.87 10*6/mm3      Hemoglobin 9.2 g/dL      Hematocrit 28.1 %      MCV 97.9 fL      MCH 32.1 pg      MCHC 32.7 g/dL      RDW 16.8 %      RDW-SD 58.4 fl      MPV 12.8 fL       Platelets 88 10*3/mm3      Neutrophil % 86.0 %      Lymphocyte % 4.6 %      Monocyte % 7.7 %      Eosinophil % 0.0 %      Basophil % 0.2 %      Immature Grans % 1.5 %      Neutrophils, Absolute 13.69 10*3/mm3      Lymphocytes, Absolute 0.73 10*3/mm3      Monocytes, Absolute 1.23 10*3/mm3      Eosinophils, Absolute 0.00 10*3/mm3      Basophils, Absolute 0.03 10*3/mm3      Immature Grans, Absolute 0.24 10*3/mm3      nRBC 0.6 /100 WBC     POC Glucose Once [[id number]]  (Abnormal) Collected: 12/07/22 0527    Specimen: Blood Updated: 12/07/22 0536     Glucose 139 mg/dL      Comment: Meter: [id number] : [id number] Norma Neil RN       STAT Lactic Acid, Reflex [[id number]]  (Abnormal) Collected: 12/07/22 0121    Specimen: Blood Updated: 12/07/22 0145     Lactate 4.1 mmol/L     Hemoglobin & Hematocrit, Blood [[id number]]  (Abnormal) Collected: 12/07/22 0121    Specimen: Blood Updated: 12/07/22 0126     Hemoglobin 8.8 g/dL      Hematocrit 27.4 %     AFP Tumor Marker [[id number]] Collected: 12/07/22 0121    Specimen: Blood Updated: 12/07/22 0123    STAT Lactic Acid, Reflex [[id number]]  (Abnormal) Collected: 12/06/22 1857    Specimen: Blood Updated: 12/06/22 1929     Lactate 3.9 mmol/L     Hemoglobin & Hematocrit, Blood [[id number]]  (Abnormal) Collected: 12/06/22 1857    Specimen: Blood Updated: 12/06/22 1917     Hemoglobin 8.7 g/dL      Hematocrit 26.4 %     Urinalysis, Microscopic Only - Urine, Clean Catch [[id number]]  (Abnormal) Collected: 12/06/22 1339    Specimen: Urine, Clean Catch Updated: 12/06/22 1459     RBC, UA 3-5 /HPF      WBC, UA 21-30 /HPF      Bacteria, UA 2+ /HPF      Squamous Epithelial Cells, UA None Seen /HPF      Hyaline Casts, UA 3-6 /LPF      Methodology Manual Light Microscopy    Urine Culture - Urine, Urine, Clean Catch [[id number]] Collected: 12/06/22 1339    Specimen: Urine, Clean Catch Updated: 12/06/22 1459    Urinalysis With Culture If Indicated - Urine, Clean Catch [[id number]]   (Abnormal) Collected: 12/06/22 1339    Specimen: Urine, Clean Catch Updated: 12/06/22 1442     Color, UA Dark Yellow     Appearance, UA Clear     pH, UA 5.5     Specific Gravity, UA 1.020     Glucose, UA Negative     Ketones, UA Negative     Bilirubin, UA Negative     Blood, UA Trace     Protein, UA Trace     Leuk Esterase, UA Moderate (2+)     Nitrite, UA Negative     Urobilinogen, UA 0.2 E.U./dL    Narrative:      In absence of clinical symptoms, the presence of pyuria, bacteria, and/or nitrites on the urinalysis result does not correlate with infection.    STAT Lactic Acid, Reflex [[id number]]  (Abnormal) Collected: 12/06/22 1055    Specimen: Blood Updated: 12/06/22 1151     Lactate 2.5 mmol/L     Hemoglobin & Hematocrit, Blood [[id number]]  (Abnormal) Collected: 12/06/22 1055    Specimen: Blood Updated: 12/06/22 1117     Hemoglobin 9.0 g/dL      Hematocrit 27.3 %     Basic Metabolic Panel [[id number]]  (Abnormal) Collected: 12/06/22 0628    Specimen: Blood Updated: 12/06/22 0730     Glucose 137 mg/dL      BUN 53 mg/dL      Creatinine 1.37 mg/dL      Sodium 137 mmol/L      Potassium 5.6 mmol/L      Chloride 105 mmol/L      CO2 19.7 mmol/L      Calcium 8.7 mg/dL      BUN/Creatinine Ratio 38.7     Anion Gap 12.3 mmol/L      eGFR 53.8 mL/min/1.73      Comment: National Kidney Foundation and American Society of Nephrology (ASN) Task Force recommended calculation based on the Chronic Kidney Disease Epidemiology Collaboration (CKD-EPI) equation refit without adjustment for race.       Narrative:      GFR Normal >60  Chronic Kidney Disease <60  Kidney Failure <15    The GFR formula is only valid for adults with stable renal function between ages 18 and 70.    STAT Lactic Acid, Reflex [[id number]]  (Abnormal) Collected: 12/06/22 0628    Specimen: Blood Updated: 12/06/22 0719     Lactate 3.6 mmol/L         ECG 12 Lead Altered Mental Status   Final Result   HEART RATE= 96  bpm   RR Interval= 620  ms   KS Interval= 155  ms   P  Horizontal Axis= 20  deg   P Front Axis= 28  deg   QRSD Interval= 85  ms   QT Interval= 351  ms   QRS Axis= -11  deg   T Wave Axis= 13  deg   - ABNORMAL ECG -   Sinus rhythm   Inferior infarct, old   NO PRIOR TRACING AVAILABLE FOR COMPARISON   Electronically Signed By: Eugene Polo ([facility]) 06-Dec-2022 07:38:49   Date and Time of Study: 2022-12-06 00:34:09          XR Chest 1 View    Result Date: 12/6/2022  No acute cardiopulmonary findings. Signer Name: Orlando Parmar MD  Signed: 12/6/2022 2:04 AM  Work[facility]: [email or web address]  [facility]        ASSESSMENT/PLAN:  Please not portions of this assessment/plan may have been copied and pasted, but I have personally seen this patient and reviewed each line of this assessment and plan for accuracy and made updates to reflect my necessary changes on 12/7/2022     Metabolic encephalopathy  -etiology not yet identified, suspect delirium with abscence of sleep for past 2 nights, however given variceal bleed will check head CT w/o.   -Consider stroke neuro consultation with any further changes, and or issues seen on head CT.   -check ammonia level stat.     Upper GI bleed secondary to esophageal and gastric variceal bleeding with acute blood loss anemia  -GI performed banding on EGD 12/6/2022  -Recommending continuing Protonix and octreotide drips  -Patient's hemoglobin now stable at 9.2 after receiving 3 units packed red blood cells 12/6/2022.   -Blood pressure remains labile with mild hypotension with ambulation.   -Continue to monitor closely.     Hemorrhagic shock-as above now stable after PRBC transfusion and banding continue to monitor closely    Syncope likely secondary to above-patient remains on telemetry no acute issues seems improved after above treatment    Restless leg syndrome-continue home Neurontin.     PLAN FOR DISPOSITION: RUPESH Mota DO  Hospitalist, [facility]  12/07/22  06:55 EST    At [facility], we  "believe that sharing information builds trust and better relationships. You are receiving this note because you recently visited [facility]. It is possible you will see health information before a provider has talked with you about it. This kind of information can be easy to misunderstand. To help you fully understand what it means for your health, we urge you to discuss this note with your provider.    \"Dictated utilizing Dragon dictation\"    "

## 2022-12-07 NOTE — PROGRESS NOTES
GI Daily Progress Note    Assessment/Plan:      Upper GI bleed       LOS: 1 day     Lionel Whitehead is a 77 y.o. male who was admitted with Upper GI bleed. He reports the symptoms are worsening. Encephalopathic after his bleed and CT was negative is oxygenating but poorly responsive andf not safe to take PO.    Subjective:    Patient expresses Poorly responsive  Patient denies Non verbal at this time    Objective:    Vital signs in last 24 hours:  Temp:  [98 °F (36.7 °C)-98.4 °F (36.9 °C)] 98.2 °F (36.8 °C)  Heart Rate:  [] 102  Resp:  [12-25] 21  BP: ()/(33-96) 119/69    Intake/Output last 3 shifts:  I/O last 3 completed shifts:  In: 2365 [P.O.:480; I.V.:100; Blood:785; IV Piggyback:1000]  Out: 600 [Urine:600]  Intake/Output this shift:  I/O this shift:  In: 1185 [I.V.:1185]  Out: -     Physical Exam:Abdomen  Sounds Normal Active Bowel Sounds   Distension Soft   Tenderness Nontender     Imaging Results (Last 72 Hours)     Procedure Component Value Units Date/Time    CT Head Without Contrast [[id number]] Collected: 12/07/22 0750     Updated: 12/07/22 0752    Narrative:      CT Head WO    HISTORY:   Acute onset of altered mental status    TECHNIQUE:   Axial unenhanced head CT. Radiation dose reduction techniques included automated exposure control or exposure modulation based on body size. Count of known CT and cardiac nuc med studies performed in previous 12 months: 0.     Time of scan: 7:43 AM    COMPARISON:   None.    FINDINGS:     The ventricles are generous in size. Cortical sulci are correspondingly prominent. No extraaxial fluid collections are seen.    No parenchymal or subarachnoid hemorrhage is present. Periventricular hypodensities are demonstrated which are nonspecific but likely represent white matter microvascular change in a patient of this age. No CT evidence of mass or acute infarct.    The mastoid air cells are clear. The visualized paranasal sinuses are clear.  Orbital structures  demonstrate no acute findings.      Impression:      Impression:  1. No clearly acute intracranial pathology demonstrated.    2. Generalized cerebral atrophy and nonspecific periventricular hypodensities which are thought to represent white matter microvascular change.    Signer Name: Andrews Cabrera MD   Signed: 12/7/2022 7:50 AM   Workstation [facility]: [facility]    [facility]    XR Chest 1 View [[id number]] Collected: 12/06/22 0204     Updated: 12/06/22 0206    Narrative:      CR Chest 1 Vw    INDICATION:   Near syncopal episode weakness     COMPARISON:    None available.    FINDINGS:  Portable AP view(s) of the chest.  The heart and mediastinal contours are normal. The lungs are clear. No pneumothorax or pleural effusion.      Impression:      No acute cardiopulmonary findings.    Signer Name: Orlando Parmar MD   Signed: 12/6/2022 2:04 AM   Workstation [facility]: [facility]    [facility]          WBC   Date Value Ref Range Status   12/07/2022 15.92 (H) 3.40 - 10.80 10*3/mm3 Final     RBC   Date Value Ref Range Status   12/07/2022 2.87 (L) 4.14 - 5.80 10*6/mm3 Final     Hemoglobin   Date Value Ref Range Status   12/07/2022 9.2 (L) 13.0 - 17.7 g/dL Final   12/07/2022 9.2 (L) 13.0 - 17.7 g/dL Final     Hematocrit   Date Value Ref Range Status   12/07/2022 28.1 (L) 37.5 - 51.0 % Final   12/07/2022 28.1 (L) 37.5 - 51.0 % Final     MCV   Date Value Ref Range Status   12/07/2022 97.9 (H) 79.0 - 97.0 fL Final     MCH   Date Value Ref Range Status   12/07/2022 32.1 26.6 - 33.0 pg Final     MCHC   Date Value Ref Range Status   12/07/2022 32.7 31.5 - 35.7 g/dL Final     RDW   Date Value Ref Range Status   12/07/2022 16.8 (H) 12.3 - 15.4 % Final     RDW-SD   Date Value Ref Range Status   12/07/2022 58.4 (H) 37.0 - 54.0 fl Final     MPV   Date Value Ref Range Status   12/07/2022 12.8 (H) 6.0 - 12.0 fL Final     Platelets   Date Value Ref Range Status   12/07/2022 88 (L) 140 - 450 10*3/mm3  Final     Neutrophil %   Date Value Ref Range Status   12/07/2022 86.0 (H) 42.7 - 76.0 % Final     Lymphocyte %   Date Value Ref Range Status   12/07/2022 4.6 (L) 19.6 - 45.3 % Final     Monocyte %   Date Value Ref Range Status   12/07/2022 7.7 5.0 - 12.0 % Final     Eosinophil %   Date Value Ref Range Status   12/07/2022 0.0 (L) 0.3 - 6.2 % Final     Basophil %   Date Value Ref Range Status   12/07/2022 0.2 0.0 - 1.5 % Final     Immature Grans %   Date Value Ref Range Status   12/07/2022 1.5 (H) 0.0 - 0.5 % Final     Neutrophils, Absolute   Date Value Ref Range Status   12/07/2022 13.69 (H) 1.70 - 7.00 10*3/mm3 Final     Lymphocytes, Absolute   Date Value Ref Range Status   12/07/2022 0.73 0.70 - 3.10 10*3/mm3 Final     Monocytes, Absolute   Date Value Ref Range Status   12/07/2022 1.23 (H) 0.10 - 0.90 10*3/mm3 Final     Eosinophils, Absolute   Date Value Ref Range Status   12/07/2022 0.00 0.00 - 0.40 10*3/mm3 Final     Basophils, Absolute   Date Value Ref Range Status   12/07/2022 0.03 0.00 - 0.20 10*3/mm3 Final     Immature Grans, Absolute   Date Value Ref Range Status   12/07/2022 0.24 (H) 0.00 - 0.05 10*3/mm3 Final     nRBC   Date Value Ref Range Status   12/07/2022 0.6 (H) 0.0 - 0.2 /100 WBC Final       Glucose   Date Value Ref Range Status   12/07/2022 144 (H) 65 - 99 mg/dL Final     Sodium   Date Value Ref Range Status   12/07/2022 137 136 - 145 mmol/L Final     Potassium   Date Value Ref Range Status   12/07/2022 5.1 3.5 - 5.2 mmol/L Final     CO2   Date Value Ref Range Status   12/07/2022 17.0 (L) 22.0 - 29.0 mmol/L Final     Chloride   Date Value Ref Range Status   12/07/2022 106 98 - 107 mmol/L Final     Anion Gap   Date Value Ref Range Status   12/07/2022 14.0 5.0 - 15.0 mmol/L Final     Creatinine   Date Value Ref Range Status   12/07/2022 1.57 (H) 0.76 - 1.27 mg/dL Final     BUN   Date Value Ref Range Status   12/07/2022 74 (H) 8 - 23 mg/dL Final     BUN/Creatinine Ratio   Date Value Ref Range Status    12/07/2022 47.1 (H) 7.0 - 25.0 Final     Calcium   Date Value Ref Range Status   12/07/2022 8.3 (L) 8.6 - 10.5 mg/dL Final     Alkaline Phosphatase   Date Value Ref Range Status   12/06/2022 109 39 - 117 U/L Final     Total Protein   Date Value Ref Range Status   12/06/2022 5.2 (L) 6.0 - 8.5 g/dL Final     ALT (SGPT)   Date Value Ref Range Status   12/06/2022 25 1 - 41 U/L Final     AST (SGOT)   Date Value Ref Range Status   12/06/2022 18 1 - 40 U/L Final     Total Bilirubin   Date Value Ref Range Status   12/06/2022 0.4 0.0 - 1.2 mg/dL Final     Albumin   Date Value Ref Range Status   12/06/2022 2.90 (L) 3.50 - 5.20 g/dL Final     Globulin   Date Value Ref Range Status   12/06/2022 2.3 gm/dL Final     HE  Variceal Bleed S/P EGD w banding 12/6  Alcoholic Cirrhosis  Thrombocytopenia    Presently on Octreotide and would like to avoid an NGT so will give enemas to treat his HE until able to safely take his Lactulose

## 2022-12-07 NOTE — PROGRESS NOTES
Ammonia level has returned at 174, after CT head was negative for acute process. Therefore this is hepatic encephalopathy, not metabolic. Lactulose scheduled to start today as per GI.

## 2022-12-08 NOTE — PROGRESS NOTES
"SERVICE: [facility] HOSPITALIST    CONSULTANTS: Gastroenterology    CHIEF COMPLAINT: Generalized weakness    SUBJECTIVE: Patient seen and examined at bedside.  Nursing staff informs me that patient remains somnolent I find him to be somnolent on exam again this morning.  He is resting comfortably.  ROS not obtained due to somnolence.     OBJECTIVE:    Physical exam is largely unchanged from previous exam, except where documented below, examination is accurate as of 12/8/2022    Physical Exam:  General:  Elderly male.  Somnolent.  No acute distress noted.   HENT: Head is atraumatic, normocephalic.  Nose is without obvious congestion and appears patent. Neck is supple and trachea is midline.   Eyes:  Pupils appear equal and round.   Cardiovascular: Heart has regular rate and rhythm with no murmurs, rubs or gallops noted.   Respiratory: Coarse breath sounds in right posterior base otherwise no wheezing or rales noted.  Rapid shallow breaths noted.   Abdominal/GI: Soft, nontender, bowel sounds present. No rebound or guarding present.   Extremities: No peripheral edema noted.   Musculoskeletal: Spontaneous movement of bilateral upper and lower extremities against gravity noted. No signs of injury or deformity noted.   Skin: Warm and dry.   Psych: Somnolent, cooperative with exam.   Neuro: No facial asymmetry noted. No focal deficits noted, able to move all 4 limbs through normal range of motion passively.     /67   Pulse 106   Temp 98.8 °F (37.1 °C) (Axillary)   Resp 26   Ht 185.4 cm (73\")   Wt 103 kg (227 lb 11.8 oz)   SpO2 96%   BMI 30.05 kg/m²     MEDS/LABS REVIEWED AND ORDERED    gabapentin, 600 mg, Oral, TID  lactulose, 20 g, Oral, TID  piperacillin-tazobactam, , ,   sodium chloride, 10 mL, Intravenous, Q12H  sodium chloride, , ,           LAB/DIAGNOSTICS:    ECG 12 Lead Altered Mental Status   Final Result   HEART RATE= 96  bpm   RR Interval= 620  ms   MD Interval= 155  ms   P " Horizontal Axis= 20  deg   P Front Axis= 28  deg   QRSD Interval= 85  ms   QT Interval= 351  ms   QRS Axis= -11  deg   T Wave Axis= 13  deg   - ABNORMAL ECG -   Sinus rhythm   Inferior infarct, old   NO PRIOR TRACING AVAILABLE FOR COMPARISON   Electronically Signed By: Eugene Polo ([facility]) 06-Dec-2022 07:38:49   Date and Time of Study: 2022-12-06 00:34:09          CT Head Without Contrast    Result Date: 12/7/2022  1.  Motion degraded exam. 2.  Within these limits, no acute intracranial abnormality. No significant change compared to CT brain same day. Signer Name: Paco Mckeon MD  Signed: 12/7/2022 11:18 PM  Workstation Name: [email or web address]  Radiology [facility]    CT Head Without Contrast    Result Date: 12/7/2022  Impression: 1. No clearly acute intracranial pathology demonstrated. 2. Generalized cerebral atrophy and nonspecific periventricular hypodensities which are thought to represent white matter microvascular change. Signer Name: Andrews Cabrera MD  Signed: 12/7/2022 7:50 AM  Workstation Name: [facility]  [facility]    XR Chest 1 View    Result Date: 12/8/2022  Airspace disease right lung including the right lung base with some elevation the right hemidiaphragm, and more rounded right superior perihilar airspace disease. Please correlate for clinical evidence for aspiration or pneumonia and follow-up to complete clearing is recommended to exclude underlying pathology Signer Name: Libby Tyler MD  Signed: 12/8/2022 6:56 AM  Workstation Name: [facility]  Radiology [facility]        ASSESSMENT/PLAN:  Please not portions of this assessment/plan may have been copied and pasted, but I have personally seen this patient and reviewed each line of this assessment and plan for accuracy and made updates to reflect my necessary changes on 12/8/2022     Hepatic encephalopathy  -ammonia remains elevated, BUN elevation likely due to blood retention post EGD, expected to pass  in time.   -Enema given overnight with positive response, mentation slightly improved thereafter   -Scheduled lactulose to be given if able orally, if not will continue with enemas, will increase frequency to every 4 hours and aim for 2-3 loose bowel movements today.   -Transaminitis with elevated total bilirubin noted  -GI continues to follow, appreciate their assistance.     Pneumonia, suspected aspiration  -Previous chest x-ray on arrival was clear with no acute cardiopulmonary findings however he now shows airspace disease right lung base  -Procalcitonin severely elevated beyond 4, Zosyn initiated.   -Lactic acid now trending down with IV fluid administration, therefore suspect dehydration due to likely aspiration event.   -While patient meets criteria for sepsis with Sep 3 scoring, this is due to previously known liver issues as well as variceal bleeding, not related to pneumonia. Therefore not septic in my opinion.   -We will place on aspiration precautions, currently on clear liquids, will ask speech to evaluate as able.     Upper GI bleed secondary to esophageal and gastric variceal bleeding with acute blood loss anemia  -GI performed banding on EGD 12/6/2022  -Recommending continuing Protonix and octreotide drips  -Patient's hemoglobin 8.5, has received 3 units packed red blood cells 12/6/2022.   -Blood pressure improving with IVF administration. Continue cautiously.     Hemorrhagic shock-resolved.     Syncope likely secondary to above-patient remains on telemetry no acute issues seems improved after above treatment    Restless leg syndrome-continue home Neurontin.     PLAN FOR DISPOSITION: RUPESH Mota DO  [facility]  12/08/22  06:55 EST    At [facility], we believe that sharing information builds trust and better relationships. You are receiving this note because you recently visited [facility]. It is possible you will see health information before a provider  "has talked with you about it. This kind of information can be easy to misunderstand. To help you fully understand what it means for your health, we urge you to discuss this note with your provider.    \"Dictated utilizing Dragon dictation\"    "

## 2022-12-08 NOTE — PROGRESS NOTES
Ammonia level a little lower than previous, but asked staff to get bowels moving.  No gag reflex at bedside so repeat CT ordered.  Doubt spontaneous bleed given platelet count.  Will also check an ABG given his lactic acidosis which could be driving his respirations.  May intubate to protect airway, but he is strict NPO.  Work-up continuing.

## 2022-12-08 NOTE — PLAN OF CARE
Goal Outcome Evaluation:  Plan of Care Reviewed With: patient, spouse        Progress: no change  Pt remains unresponsive except to painful stimuli. Pt was given tap water enema x 2 this AM with some results. Dr. Saleem placed a NG tube at bedside and we are now giving lactulose through it q 4 hours. He remains on IV octreotide and protonix. Pt remains on IV zoysn for possible aspiration.  He has had good urine output. VSS.

## 2022-12-08 NOTE — SIGNIFICANT NOTE
12/08/22 1400   Rehab Evaluation   Session Not Performed unable to evaluate, medical status change   Evaluation Not Performed, Comment Pt continues to be unable to participate in swallow eval due to decreased LOC. Most recent GCS is 7. RN also stating not appropriate at this time. Will check back tomorrow for ability to participate.

## 2022-12-08 NOTE — PLAN OF CARE
Goal Outcome Evaluation:  Plan of Care Reviewed With: patient, spouse        Progress: no change  Outcome Evaluation: Pt responsive to painful stimuli in all 4 extremities this morning, seems to be waking up some and responding occasionally to voice. 2 enemas given this shift with several BM's. Zosyn given per order. ST on the monitor- had an episode of HR getting up to the 150's but has since decreased and is sustaining in the low 100's currently. CT head and CXR completed this shift, see results. Sent blood cultures. AM labs pending.

## 2022-12-08 NOTE — PROGRESS NOTES
Re-evaluated Mr. Nguyễn.  Creatinine has increased and lactic acid a little higher which explains breathing pattern and pCO2.  Procalcitonin is also elevated.  Zosyn given.  Will place Yancey and increase fluids.  Gave additional enema w/ positive response.  Mentation seems to be improving a little, and gag reflex noted on suctioning.

## 2022-12-08 NOTE — PROGRESS NOTES
GI Daily Progress Note    Assessment/Plan:      Upper GI bleed       LOS: 2 days     Lionel Whitehead is a 77 y.o. male who was admitted with Upper GI bleed. He reports the symptoms are unchanged. Still comatose from HE, has nasal trumpet in place an tolerating with out issue. No response to verbal or physical stimulation  HGB 8.5 on IVFs and his BUN continues to be elevated n has received at lest 3 enemas w/o effect    Subjective:    Patient expresses Non-Verbal  Patient denies Non-Verbal    Objective:    Vital signs in last 24 hours:  Temp:  [97.6 °F (36.4 °C)-99 °F (37.2 °C)] 97.6 °F (36.4 °C)  Heart Rate:  [100-126] 100  Resp:  [21-30] 22  BP: ()/(33-91) 120/58    Intake/Output last 3 shifts:  I/O last 3 completed shifts:  In: 1665 [P.O.:480; I.V.:1185]  Out: 770 [Urine:770]  Intake/Output this shift:  I/O this shift:  In: 100 [IV Piggyback:100]  Out: 615 [Urine:615]    Physical Exam:Abdomen  Sounds Normal Active Bowel Sounds   Distension Soft   Tenderness Nontender     Imaging Results (Last 72 Hours)     Procedure Component Value Units Date/Time    XR Chest 1 View [[id number]] Collected: 12/08/22 0656     Updated: 12/08/22 0658    Narrative:      CR Chest 1 Vw    INDICATION:   Increased secretions. Unspecified GI hemorrhage.     COMPARISON:    None available.    FINDINGS:  Portable AP view(s) of the chest. 2 films    Cardiac silhouette size is top normal.    There is elevation of the right hemidiaphragm that is new from prior with some right base airspace disease. Atelectasis, aspiration or early pneumonia in differential. There is also some patchy airspace disease in the right superior perihilar region also  new from prior. Follow-up to clearing recommended to exclude underlying pathology. There is no pneumothorax or pleural effusion. There is no acute congestive heart failure.      Impression:      Airspace disease right lung including the right lung base with some elevation the right hemidiaphragm, and  more rounded right superior perihilar airspace disease. Please correlate for clinical evidence for aspiration or pneumonia and follow-up to  complete clearing is recommended to exclude underlying pathology    Signer Name: Libby Tyler MD   Signed: 12/8/2022 6:56 AM   Workstation Name: [email or web address]    [facility]    CT Head Without Contrast [[id number]] Collected: 12/07/22 2318     Updated: 12/07/22 2320    Narrative:      CT Head WO    HISTORY: Mental status change, persistent or worsening;Gastrointestinal hemorrhage, unspecified    COMPARISON: CT brain 12/7/2022.    TECHNIQUE: CT of the brain without IV contrast. Coronal and sagittal reconstructions were obtained.  Radiation dose reduction techniques included automated exposure control or exposure modulation based on body size. Count of known CT and cardiac nuc med  studies performed in previous 12 months: 0.     Time of Scan: 11:07 PM    FINDINGS:  Portions of the study are degraded by patient motion.    No acute intracranial hemorrhage, mass effect, midline shift, or hydrocephalus.     Gray-white matter differentiation is within normal limits. Mild patchy hypodensities in the cerebral white matter, nonspecific but likely representing chronic microvascular ischemic changes.     Ventricles and cortical sulci are mildly prominent, in keeping with age-related volume loss. Basal cisterns are clear.     Calvarium is intact.     Visualized paranasal sinuses are clear. Visualized mastoid air cells are clear.      Impression:        1.  Motion degraded exam.  2.  Within these limits, no acute intracranial abnormality. No significant change compared to CT brain same day.    Signer Name: Paco Mckeon MD   Signed: 12/7/2022 11:18 PM   Workstation Name: [id number]    [facility]    CT Head Without Contrast [[id number]] Collected: 12/07/22 0750     Updated: 12/07/22 0752    Narrative:      CT Head WO    HISTORY:   Acute onset of altered  mental status    TECHNIQUE:   Axial unenhanced head CT. Radiation dose reduction techniques included automated exposure control or exposure modulation based on body size. Count of known CT and cardiac nuc med studies performed in previous 12 months: 0.     Time of scan: 7:43 AM    COMPARISON:   None.    FINDINGS:     The ventricles are generous in size. Cortical sulci are correspondingly prominent. No extraaxial fluid collections are seen.    No parenchymal or subarachnoid hemorrhage is present. Periventricular hypodensities are demonstrated which are nonspecific but likely represent white matter microvascular change in a patient of this age. No CT evidence of mass or acute infarct.    The mastoid air cells are clear. The visualized paranasal sinuses are clear.  Orbital structures demonstrate no acute findings.      Impression:      Impression:  1. No clearly acute intracranial pathology demonstrated.    2. Generalized cerebral atrophy and nonspecific periventricular hypodensities which are thought to represent white matter microvascular change.    Signer Name: Andrews Cabrera MD   Signed: 12/7/2022 7:50 AM   Workstation Name: [facility]    Radiology [facility]    XR Chest 1 View [[id number]] Collected: 12/06/22 0204     Updated: 12/06/22 0206    Narrative:      CR Chest 1 Vw    INDICATION:   Near syncopal episode weakness     COMPARISON:    None available.    FINDINGS:  Portable AP view(s) of the chest.  The heart and mediastinal contours are normal. The lungs are clear. No pneumothorax or pleural effusion.      Impression:      No acute cardiopulmonary findings.    Signer Name: Orlando Parmar MD   Signed: 12/6/2022 2:04 AM   Workstation [facility]: [facility]    [facility]iology [facility]          WBC   Date Value Ref Range Status   12/07/2022 15.92 (H) 3.40 - 10.80 10*3/mm3 Final     RBC   Date Value Ref Range Status   12/07/2022 2.87 (L) 4.14 - 5.80 10*6/mm3 Final     Hemoglobin   Date Value Ref  Range Status   12/08/2022 8.5 (L) 13.0 - 17.7 g/dL Final     Hematocrit   Date Value Ref Range Status   12/08/2022 25.6 (L) 37.5 - 51.0 % Final     MCV   Date Value Ref Range Status   12/07/2022 97.9 (H) 79.0 - 97.0 fL Final     MCH   Date Value Ref Range Status   12/07/2022 32.1 26.6 - 33.0 pg Final     MCHC   Date Value Ref Range Status   12/07/2022 32.7 31.5 - 35.7 g/dL Final     RDW   Date Value Ref Range Status   12/07/2022 16.8 (H) 12.3 - 15.4 % Final     RDW-SD   Date Value Ref Range Status   12/07/2022 58.4 (H) 37.0 - 54.0 fl Final     MPV   Date Value Ref Range Status   12/07/2022 12.8 (H) 6.0 - 12.0 fL Final     Platelets   Date Value Ref Range Status   12/07/2022 88 (L) 140 - 450 10*3/mm3 Final     Neutrophil %   Date Value Ref Range Status   12/07/2022 86.0 (H) 42.7 - 76.0 % Final     Lymphocyte %   Date Value Ref Range Status   12/07/2022 4.6 (L) 19.6 - 45.3 % Final     Monocyte %   Date Value Ref Range Status   12/07/2022 7.7 5.0 - 12.0 % Final     Eosinophil %   Date Value Ref Range Status   12/07/2022 0.0 (L) 0.3 - 6.2 % Final     Basophil %   Date Value Ref Range Status   12/07/2022 0.2 0.0 - 1.5 % Final     Immature Grans %   Date Value Ref Range Status   12/07/2022 1.5 (H) 0.0 - 0.5 % Final     Neutrophils, Absolute   Date Value Ref Range Status   12/07/2022 13.69 (H) 1.70 - 7.00 10*3/mm3 Final     Lymphocytes, Absolute   Date Value Ref Range Status   12/07/2022 0.73 0.70 - 3.10 10*3/mm3 Final     Monocytes, Absolute   Date Value Ref Range Status   12/07/2022 1.23 (H) 0.10 - 0.90 10*3/mm3 Final     Eosinophils, Absolute   Date Value Ref Range Status   12/07/2022 0.00 0.00 - 0.40 10*3/mm3 Final     Basophils, Absolute   Date Value Ref Range Status   12/07/2022 0.03 0.00 - 0.20 10*3/mm3 Final     Immature Grans, Absolute   Date Value Ref Range Status   12/07/2022 0.24 (H) 0.00 - 0.05 10*3/mm3 Final     nRBC   Date Value Ref Range Status   12/07/2022 0.6 (H) 0.0 - 0.2 /100 WBC Final       Glucose    Date Value Ref Range Status   12/08/2022 170 (H) 65 - 99 mg/dL Final     Sodium   Date Value Ref Range Status   12/08/2022 140 136 - 145 mmol/L Final     Potassium   Date Value Ref Range Status   12/08/2022 4.7 3.5 - 5.2 mmol/L Final     CO2   Date Value Ref Range Status   12/08/2022 15.4 (L) 22.0 - 29.0 mmol/L Final     Chloride   Date Value Ref Range Status   12/08/2022 109 (H) 98 - 107 mmol/L Final     Anion Gap   Date Value Ref Range Status   12/08/2022 15.6 (H) 5.0 - 15.0 mmol/L Final     Creatinine   Date Value Ref Range Status   12/08/2022 1.83 (H) 0.76 - 1.27 mg/dL Final     BUN   Date Value Ref Range Status   12/08/2022 89 (H) 8 - 23 mg/dL Final     BUN/Creatinine Ratio   Date Value Ref Range Status   12/08/2022 48.6 (H) 7.0 - 25.0 Final     Calcium   Date Value Ref Range Status   12/08/2022 8.1 (L) 8.6 - 10.5 mg/dL Final     Alkaline Phosphatase   Date Value Ref Range Status   12/08/2022 397 (H) 39 - 117 U/L Final     Total Protein   Date Value Ref Range Status   12/08/2022 4.9 (L) 6.0 - 8.5 g/dL Final     ALT (SGPT)   Date Value Ref Range Status   12/08/2022 3,613 (H) 1 - 41 U/L Final     AST (SGOT)   Date Value Ref Range Status   12/08/2022 889 (H) 1 - 40 U/L Final     Total Bilirubin   Date Value Ref Range Status   12/08/2022 1.8 (H) 0.0 - 1.2 mg/dL Final     Albumin   Date Value Ref Range Status   12/08/2022 2.80 (L) 3.50 - 5.20 g/dL Final     Globulin   Date Value Ref Range Status   12/08/2022 2.1 gm/dL Final     HE  Variceal Bleed S/P EGD w banding 12/6  Alcoholic Cirrhosis  Thrombocytopenia    Placed NGT at bedside and awaiting  KUB prior to changing his Enema to Lactulose via NGT

## 2022-12-08 NOTE — NURSING NOTE
Called Dr. Carter due to concern for pt's lethargic state. Pt is snoring and not responsive. STAT ammonia drawn per order.     Result of ammonia level given to Dr. Carter, verbal order to administer ordered tap water enema early due to pt being unable to take PO Lactulose.     Enema given with results- stool is black.   Will recheck ammonia in the AM.     Pt's wife was updated via telephone, questions answered.    Noah informed of ok'd orders.  Order for walker placed, review and approve if ok.

## 2022-12-09 PROBLEM — G93.41 METABOLIC ENCEPHALOPATHY: Status: ACTIVE | Noted: 2022-01-01

## 2022-12-09 PROBLEM — K72.10 CHRONIC LIVER FAILURE WITHOUT HEPATIC COMA (HCC): Status: ACTIVE | Noted: 2022-01-01

## 2022-12-09 PROBLEM — E72.20 HYPERAMMONEMIA (HCC): Status: ACTIVE | Noted: 2022-01-01

## 2022-12-09 PROBLEM — K76.82 ENCEPHALOPATHY, HEPATIC (HCC): Status: ACTIVE | Noted: 2022-01-01

## 2022-12-09 NOTE — PROGRESS NOTES
"SERVICE: [facility] HOSPITALIST    CONSULTANTS: Gastroenterology    CHIEF COMPLAINT: Encephalopathy, liver failure, GI bleed    SUBJECTIVE: Patient responsive to pain but is not following commands, is still extremely encephalopathic.  He does open eyes but does not track with his eyes.  Per nursing he has been having a large amount of bowel movements with the lactulose, 6-7 in the last 24 hours.    OBJECTIVE:    Physical exam is largely unchanged from previous exam, except where documented below, examination is accurate as of 12/9/2022    Physical Exam:  General:  Elderly male.  Somnolent.  No acute distress noted.   HENT: NG tube and nasal trumpet in place.  Head is atraumatic, normocephalic.  Nose is without obvious congestion and appears patent. Neck is supple and trachea is midline.   Eyes:  Pupils appear equal and round.   Cardiovascular: Heart has regular rate and rhythm with no murmurs, rubs or gallops noted.   Respiratory: Coarse breath sounds in right posterior base otherwise no wheezing or rales noted.  Moderately tachypneic  Abdominal/GI: Soft, mild grimacing on heavy palpation, bowel sounds present. No rebound or guarding present.   Extremities: 1+ pitting edema in all 4 extremities  Musculoskeletal: Spontaneous movement of bilateral upper and lower extremities against gravity noted. No signs of injury or deformity noted.   Skin: Warm and dry.   Psych: Unable to assess  Neuro: Patient only responsive to pain, does not follow commands or track with eyes.  He does intermittently move his limbs slightly during the interview.  Pupils are equal and reactive to light.  Exam limited secondary to inability of patient to participate.  No facial asymmetry noted    /73 (BP Location: Left arm, Patient Position: Lying)   Pulse 97   Temp 98.6 °F (37 °C) (Axillary)   Resp 20   Ht 185.4 cm (73\")   Wt 103 kg (227 lb 11.8 oz)   SpO2 95%   BMI 30.05 kg/m²     MEDS/LABS REVIEWED AND " ORDERED    lactulose, 20 g, Nasogastric, Q4H  octreotide, , ,   piperacillin-tazobactam, 4.5 g, Intravenous, Q8H  sodium chloride, 10 mL, Intravenous, Q12H  sodium chloride, , ,           LAB/DIAGNOSTICS:    ECG 12 Lead Rhythm Change   Final Result   HEART RATE= 103  bpm   RR Interval= 600  ms   AZ Interval= 149  ms   P Horizontal Axis= 13  deg   P Front Axis= 25  deg   QRSD Interval= 76  ms   QT Interval= 354  ms   QRS Axis= -24  deg   T Wave Axis= 51  deg   - ABNORMAL ECG -   Sinus tachycardia   Paired ventricular premature complexes   Inferior infarct, old   now pvcs and pacs   Electronically Signed By: Deirdre Rg ([facility]) 09-Dec-2022 12:01:05   Date and Time of Study: 2022-12-08 23:59:26      ECG 12 Lead Altered Mental Status   Final Result   HEART RATE= 96  bpm   RR Interval= 620  ms   AZ Interval= 155  ms   P Horizontal Axis= 20  deg   P Front Axis= 28  deg   QRSD Interval= 85  ms   QT Interval= 351  ms   QRS Axis= -11  deg   T Wave Axis= 13  deg   - ABNORMAL ECG -   Sinus rhythm   Inferior infarct, old   NO PRIOR TRACING AVAILABLE FOR COMPARISON   Electronically Signed By: Eugene Polo ([facility]) 06-Dec-2022 07:38:49   Date and Time of Study: 2022-12-06 00:34:09          CT Head Without Contrast    Result Date: 12/7/2022  1.  Motion degraded exam. 2.  Within these limits, no acute intracranial abnormality. No significant change compared to CT brain same day. Signer Name: Paco Mckeon MD  Signed: 12/7/2022 11:18 PM  Workstation Name: [id number]  [facility]    XR Chest 1 View    Result Date: 12/8/2022  Airspace disease right lung including the right lung base with some elevation the right hemidiaphragm, and more rounded right superior perihilar airspace disease. Please correlate for clinical evidence for aspiration or pneumonia and follow-up to complete clearing is recommended to exclude underlying pathology Signer Name: Libby Tyler MD  Signed: 12/8/2022 6:56 AM  Workstation Name:  [email or web address] [email or web address]-  [facility] [place]    XR Abdomen KUB    Result Date: 12/8/2022  1. Enteric tube tip at the level of the mid body stomach. 2. Probable right renal/pelvic stone and additional findings suspicious for cholelithiasis.  This report was finalized on 12/8/2022 2:21 PM by Dr. Dion [place] MD.          ASSESSMENT/PLAN:  Please not portions of this assessment/plan may have been copied and pasted, but I have personally seen this patient and reviewed each line of this assessment and plan for accuracy and made updates to reflect my necessary changes on 12/9/2022     Encephalopathy- likely multifactorial  I have done some deep chart review of this patient, including reading some messages he has sent to his physicians offices this year and my chart.  At baseline this patient seems to be a very sharp individual.  His wife states that this is never happened before  Despite having 6-7 bowel movements since yesterday the patient's encephalopathy has not improved  He has been receiving scheduled gabapentin and his liver enzymes and renal function are both compromised at this time, especially his liver function with an ALT of 2610 today.  This alone could be causing his symptoms.  I have stopped the gabapentin and have ordered an abdominal US  In addition, the patient has an active infection which could also be contributing to his altered mental status.  His urine culture had 50,000 CFU's of mixed laury suggesting it may need to be repeated.  Have ordered another UA with culture. His CXR did show possible PNA as well.  Broadening antibiotic coverage with vancomycin for now, pharmacy dosing. Have also ordered MRSA screen  Will continue lactulose, have ordered a fecal management system for patient to help with potential skin breakdown and bacterial spread to his Yancey  Will go ahead and consult the fabulous neurologist Dr. Finn Cazares to assist in working this patient up.  He has ordered an MRI which I heartily  agree with.  GI continues to follow, appreciate their assistance.     Pneumonia versus aspiration pneumonitis  -Previous chest x-ray on arrival was clear with no acute cardiopulmonary findings however he now shows airspace disease right lung base  -However, patient is not necessitating supplemental O2.  He does seem to be tachypneic however  -Procalcitonin severely elevated beyond 4, Zosyn initiated.   -Have ordered MRSA screen and started vancomycin, pharmacy to dose for now  -Lactic acid now trending down with IV fluid administration, therefore suspect dehydration due to likely aspiration event.   -While patient meets criteria for sepsis with Sep 3 scoring, this is due to previously known liver issues as well as variceal bleeding, not related to pneumonia. Therefore not septic in my opinion.   -We will place on aspiration precautions, currently on clear liquids, will ask speech to evaluate as able.     Upper GI bleed secondary to esophageal and gastric variceal bleeding with acute blood loss anemia/liver failure  -GI performed banding on EGD 12/6/2022  -Recommending continuing Protonix and octreotide drips  -Patient's hemoglobin 8.5, has received 3 units packed red blood cells 12/6/2022.   -Blood pressure improving with IVF administration.  Have decreased rate of IV fluids to 100 cc/hour as patient is starting to experience edema  -Considering patient's liver enzymes being so elevated and gallstones being seen on abdominal x-ray have started with abdominal ultrasound.  Pending results may need to consider other imaging    Hemorrhagic shock-resolved.     Syncope likely secondary to above-patient remains on telemetry no acute issues seems improved after above treatment    Restless leg syndrome- holding gabapentin due to encephalopathy    PLAN FOR DISPOSITION: RUPESH Mota DO  Hospitalist, [facility]  12/09/22  06:55 EST    At [facility], we believe that sharing information builds trust  "and better relationships. You are receiving this note because you recently visited [facility]. It is possible you will see health information before a provider has talked with you about it. This kind of information can be easy to misunderstand. To help you fully understand what it means for your health, we urge you to discuss this note with your provider.    \"Dictated utilizing Dragon dictation\"    "

## 2022-12-09 NOTE — PROGRESS NOTES
"Pharmacy Antimicrobial Dosing Service    Subjective:  Lionel Whitehead is a 77 y.o.male admitted with generalized weakness. Pharmacy has been consulted to dose Vancomycin for possible sepsis.      Assessment/Plan    1. Day #1 Vancomycin: Goal -600 mcg*h/mL. Will give loading dose of vancomycin 2000 mg IV (~20 mg/kg ABW)x1 then begin regimen of vancomycin 1500 mg IV (~16 mg/kg AdjBW) IV q24h and obtain levels prior to 4th dose.     2. Day #3 Piperacillin/Tazobactam: 4.5 gm IV q8h for estCrCl > 20 mL/min.    Will continue to monitor drug levels, renal function, culture and sensitivities, and patient clinical status.       Objective:  Relevant clinical data and objective history reviewed:  185.4 cm (73\")   103 kg (227 lb 11.8 oz)   Ideal body weight: 79.9 kg (176 lb 2.4 oz)  Adjusted ideal body weight: 89.3 kg (196 lb 12.5 oz)  Body mass index is 30.05 kg/m².        Results from last 7 days   Lab Units 12/08/22  2354 12/08/22  0616 12/08/22  0017   CREATININE mg/dL 1.50* 1.83* 1.93*     Estimated Creatinine Clearance: 53.6 mL/min (A) (by C-G formula based on SCr of 1.5 mg/dL (H)).  I/O last 3 completed shifts:  In: 8672.8 [I.V.:7932.8; Other:440; IV Piggyback:300]  Out: 2395 [Urine:2395]    Results from last 7 days   Lab Units 12/07/22  0536 12/06/22  0628 12/06/22  0012   WBC 10*3/mm3 15.92* 15.38* 14.30*     Temperature    12/09/22 0000 12/09/22 0400 12/09/22 0800   Temp: 97.8 °F (36.6 °C) 98.2 °F (36.8 °C) 98.6 °F (37 °C)     Baseline culture/source/susceptibility:  Microbiology Results (last 10 days)       Procedure Component Value - Date/Time    Blood Culture - Blood, Hand, Right [[id number]]  (Normal) Collected: 12/08/22 0205    Lab Status: Preliminary result Specimen: Blood from Hand, Right Updated: 12/09/22 0215     Blood Culture No growth at 24 hours    Narrative:      Less than seven (7) mL's of blood was collected.  Insufficient quantity may yield false negative results.    Blood Culture - Blood, Hand, " Right [[id number]]  (Normal) Collected: 12/08/22 0205    Lab Status: Preliminary result Specimen: Blood from Hand, Right Updated: 12/09/22 0215     Blood Culture No growth at 24 hours    Narrative:      Less than seven (7) mL's of blood was collected.  Insufficient quantity may yield false negative results.    Urine Culture - Urine, Urine, Clean Catch [[id number]] Collected: 12/06/22 1339    Lab Status: Final result Specimen: Urine, Clean Catch Updated: 12/07/22 1308     Urine Culture 50,000 CFU/mL Mixed [place] Isolated    Narrative:      Specimen contains mixed organisms of questionable pathogenicity suggestive of contamination. If symptoms persist, suggest recollection.  Colonization of the urinary tract without infection is common. Treatment is discouraged unless the patient is symptomatic, pregnant, or undergoing an invasive urologic procedure.    COVID-19 and FLU A/B PCR - Swab, Nasopharynx [[id number]]  (Normal) Collected: 12/06/22 0203    Lab Status: Final result Specimen: Swab from Nasopharynx Updated: 12/06/22 0306     COVID19 Not Detected     Influenza A PCR Not Detected     Influenza B PCR Not Detected    Narrative:      Fact sheet for providers: https://www.fda.gov/media/158080/download    Fact sheet for patients: https://www.fda.gov/media/990527/download    Test performed by PCR.            Anti-Infectives (From admission, onward)      Ordered     Dose/Rate Route Frequency Start Stop    12/09/22 1313  vancomycin (VANCOCIN) 1,500 mg in sodium chloride 0.9 % 500 mL IVPB        Ordering Provider: Rena Carrera DO See Hyperspace for full Linked Orders Report.    15 mg/kg × 103 kg  over 120 Minutes Intravenous Every 24 Hours 12/10/22 0000 12/15/22 2359    12/09/22 1313  vancomycin (VANCOCIN) 2,000 mg in sodium chloride 0.9 % 500 mL IVPB        Ordering Provider: Rena Carrera DO   See Hyperspace for full Linked Orders Report.    20 mg/kg × 103 kg  over 120 Minutes Intravenous Once  12/09/22 1400      12/09/22 1254  Pharmacy to dose vancomycin        Ordering Provider: Rena Carrera DO     Does not apply Continuous PRN 12/09/22 1254 12/16/22 1253    12/08/22 0743  piperacillin-tazobactam (ZOSYN) 4.5 g/100 mL 0.9% NS IVPB (mbp)        Ordering Provider: Lionel Mota DO    4.5 g  over 4 Hours Intravenous Every 8 Hours 12/08/22 0900 12/13/22 0859    12/07/22 2346  piperacillin-tazobactam (ZOSYN) 4.5 g/100 mL 0.9% NS IVPB (mbp)        Ordering Provider: Kd Carter MD    4.5 g  over 4 Hours Intravenous Once 12/08/22 0030 12/08/22 0353    12/07/22 2350  piperacillin-tazobactam (ZOSYN) 4.5 (4-0.5) g injection  - ADS Override Pull        Note to Pharmacy: Created by cabinet override   Ordering Provider: Crissy Chi, RN       12/07/22 2350 12/08/22 1159            Idalia WYATT, [facility]  12/09/22 13:14 EST

## 2022-12-09 NOTE — SIGNIFICANT NOTE
12/09/22 0923   Rehab Evaluation   Session Not Performed unable to treat, medical status change   Evaluation Not Performed, Comment Pt continues to be mostly unresponsive. Will respond to oral swab in mouth by turning away or chewing on the swab for brief moments. No other response noted. Will f/u this afternoon.

## 2022-12-09 NOTE — CONSULTS
Patient Identification:  NAME:  Lionel Whitehead  Age:  77 y.o.   Sex:  male   :  1945   MRN:  [id number]       Chief complaint: He does not have one, reason for consult evaluate change in mental status    History of present illness: Patient is a 77-year-old right-handed white male with history of esophageal varices, restless leg syndrome, liver disease but stopped drinking at least 12 or 13 years ago according to his wife.  He comes to the hospital where he has had diarrhea and became very weak and felt like he was going to pass out.  There may be some coffee-ground emesis.  At that point to when he first presented he has been seen by GI and followed for years and they have seen him at this time with his anemia.  He has had pneumonia as an associated symptom modifying factors antibiotics associated symptoms he has had significant hyperammonemia treated with lactulose.  Locations not pertinent in this case although he has had decreased movement in the right arm apparently because he has had pain in that arm.  According to the wife now were not sure if there is anything else going on.  Quality he is lethargic and very confused.  He is not speaking at this time.  She has not witnessed any seizure activity.  His arterial blood gas performed earlier today showed PCO2 of 29, PO2 of 67.5, pH 7.465.,  Creatinine 1.5 SGPT 2610, total bilirubin 1.8, hemoglobin 8.6.  Ammonia yesterday 126.  Quality is poor response      Past medical history:  Past Medical History:   Diagnosis Date   • History of esophageal varices    • Restless leg syndrome        Past surgical history:  Past Surgical History:   Procedure Laterality Date   • ENDOSCOPY     • ENDOSCOPY W/ BANDING N/A 2022    Procedure: ESOPHAGOGASTRODUODENOSCOPY WITH BANDING;  Surgeon: Son Saleem MD;  Location: [facility];  Service: Gastroenterology;  Laterality: N/A;  varices       Allergies:  Patient has no known allergies.    Home  medications:  Medications Prior to Admission   Medication Sig Dispense Refill Last Dose   • gabapentin (NEURONTIN) 600 MG tablet TAKE TWO TABLETS BY MOUTH THREE TIMES A DAY AND THREE TABLETS AT BEDTIME. 810 tablet 1 2022   • ibuprofen (ADVIL,MOTRIN) 200 MG tablet Take 1 tablet by mouth Daily.   2022 at 2100   • multivitamin with minerals tablet tablet Take 1 tablet by mouth Daily.   2022   • oxyCODONE-acetaminophen (Percocet)  MG per tablet Take 1 tablet by mouth 2 (Two) Times a Day As Needed for Moderate Pain. 15 tablet 0 2022        Hospital medications:  lactulose, 20 g, Nasogastric, Q4H  octreotide, , ,   piperacillin-tazobactam, 4.5 g, Intravenous, Q8H  sodium chloride, 10 mL, Intravenous, Q12H  sodium chloride, , ,       lactated ringers, 100 mL/hr, Last Rate: 100 mL/hr (22 1200)  lactated ringers, 9 mL/hr  octreotide (SandoSTATIN) infusion, 50 mcg/hr, Last Rate: 50 mcg/hr (22 0523)  pantoprazole, 8 mg/hr, Last Rate: 8 mg/hr (22 0909)      •  acetaminophen  •  lactated ringers  •  sodium chloride  •  sodium chloride    Family history:  Family History   Problem Relation Age of Onset   • No Known Problems Other        Social history:  Social History     Tobacco Use   • Smoking status: Former     Types: Pipe     Quit date:      Years since quittin.9   • Smokeless tobacco: Never   Vaping Use   • Vaping Use: Never used   Substance Use Topics   • Alcohol use: Defer   • Drug use: Defer       Review of systems:    His wife is here who states that he has not had a stroke.  He has had weakness in the right arm due to pain and is recently had a shot in that arm.  She has never had seizures or stroke or heart attack.  He stopped drinking at least 10 or 12 years ago.  No other review of systems is possible from the wife and certainly from the patient.  I have reviewed the chart fully    Objective:  Vitals Ranges:   Temp:  [97.4 °F (36.3 °C)-98.6 °F (37 °C)] 98.6 °F (37  °C)  Heart Rate:  [] 97  Resp:  [20-24] 20  BP: ()/(58-85) 147/73      Physical Exam: Patient is awake moderately alert eyes are open and staring forward then he seemed to develop a bit of a left gaze preference, as I spoke to him from the left side of the bed.  He did eventually become back to the midline.  I could not get him to look at me to the right or definitely he would not blink to threat.  He cannot answer questions orientation fund of knowledge attention span concentration recent remote memory.  He is in no distress pupils 2-1/2 slight reaction corneals are present eyes are conjugate face is symmetrical and his mouth is hanging open.  Motor definite withdrawal to pain in the left upper extremity.  No pain was given in the right.  He has decreased tone in that arm compared to the right, see comments above, would not follow commands.  No atrophy or fasciculations reflexes absent but toes are upgoing bilaterally.  Right a little brisker than on the left.  Sensation coordination Station and gait cannot be tested.  Heart is regular without murmur neck supple moderately supple without bruits extremities no clubbing cyanosis he does have peripheral edema in the ankles and feet visual acuity does not blink to threat    Results review:   I reviewed the patient's new clinical results.    Data review:  Lab Results (last 24 hours)     Procedure Component Value Units Date/Time    Blood Gas, Arterial - [[id number]]  (Abnormal) Collected: 12/09/22 1105    Specimen: Arterial Blood Updated: 12/09/22 1107     Site Right Brachial     Yunior's Test N/A     pH, Arterial 7.465 pH units      Comment: 83 Value above reference range        pCO2, Arterial 29.0 mm Hg      Comment: 84 Value below reference range        pO2, Arterial 67.5 mm Hg      Comment: 84 Value below reference range        HCO3, Arterial 20.8 mmol/L      Base Excess, Arterial -2.4 mmol/L      Comment: 84 Value below reference range        O2  Saturation, Arterial 93.7 %      Comment: 84 Value below reference range        Hemoglobin, Blood Gas 8.2 g/dL      Comment: 84 Value below reference range        Temperature 37.0 C      Barometric Pressure for Blood Gas 741 mmHg      Modality RA     Collected by [id number]     Comment: Meter: [id number]     :  [id number]        pCO2, Temperature Corrected 29.0 mm Hg      pH, Temp Corrected 7.465 pH Units      pO2, Temperature Corrected 67.5 mm Hg     Blood Culture - Blood, Hand, Right [[id number]]  (Normal) Collected: 12/08/22 0205    Specimen: Blood from Hand, Right Updated: 12/09/22 0215     Blood Culture No growth at 24 hours    Narrative:      Less than seven (7) mL's of blood was collected.  Insufficient quantity may yield false negative results.    Blood Culture - Blood, Hand, Right [[id number]]  (Normal) Collected: 12/08/22 0205    Specimen: Blood from Hand, Right Updated: 12/09/22 0215     Blood Culture No growth at 24 hours    Narrative:      Less than seven (7) mL's of blood was collected.  Insufficient quantity may yield false negative results.    Comprehensive Metabolic Panel [[id number]]  (Abnormal) Collected: 12/08/22 2354    Specimen: Blood Updated: 12/09/22 0032     Glucose 155 mg/dL      BUN 85 mg/dL      Creatinine 1.50 mg/dL      Sodium 144 mmol/L      Potassium 4.2 mmol/L      Chloride 112 mmol/L      CO2 19.0 mmol/L      Calcium 8.0 mg/dL      Total Protein 4.9 g/dL      Albumin 2.80 g/dL      ALT (SGPT) 2,610 U/L      AST (SGOT) 362 U/L      Alkaline Phosphatase 360 U/L      Total Bilirubin 1.8 mg/dL      Globulin 2.1 gm/dL      A/G Ratio 1.3 g/dL      BUN/Creatinine Ratio 56.7     Anion Gap 13.0 mmol/L      eGFR 48.2 mL/min/1.73      Comment: National Kidney Foundation and American Society of Nephrology (ASN) Task Force recommended calculation based on the Chronic Kidney Disease Epidemiology Collaboration (CKD-EPI) equation refit without adjustment for race.       Narrative:      GFR  Normal >60  Chronic Kidney Disease <60  Kidney Failure <15    The GFR formula is only valid for adults with stable renal function between ages 18 and 70.    Phosphorus [[id number]]  (Normal) Collected: 12/08/22 2354    Specimen: Blood Updated: 12/09/22 0021     Phosphorus 4.2 mg/dL     Magnesium [[id number]]  (Normal) Collected: 12/08/22 2354    Specimen: Blood Updated: 12/09/22 0021     Magnesium 2.0 mg/dL     Hemoglobin & Hematocrit, Blood [[id number]]  (Abnormal) Collected: 12/08/22 1610    Specimen: Blood Updated: 12/08/22 1618     Hemoglobin 8.6 g/dL      Hematocrit 27.3 %            Imaging:  Imaging Results (Last 24 Hours)     Procedure Component Value Units Date/Time    XR Abdomen KUB [[id number]] Collected: 12/08/22 1418     Updated: 12/08/22 1423    Narrative:      XR ABDOMEN KUB-: 12/8/2022 1:53 PM     INDICATION:   NG tube placement     COMPARISON:   None available.     FINDINGS:  AP radiographs of the abdomen. There is an enteric tube present and the  tip is at the level of the mid body stomach. There are calcific  densities in the right midabdomen, one of which appears to represent a  renal or renal pelvic stone measuring 9 mm. Other smaller calcific  densities in the right upper quadrant are nonspecific but suspicious for  cholelithiasis. No suspicious calcifications in the pelvis. There is a  Yancey catheter present..     The bowel gas pattern is nonobstructive. No acute osseous abnormalities.  No radiopaque foreign body. Dextroscoliosis and secondary degenerative  change in the lumbar spine.       Impression:      1. Enteric tube tip at the level of the mid body stomach.  2. Probable right renal/pelvic stone and additional findings suspicious  for cholelithiasis.     This report was finalized on 12/8/2022 2:21 PM by Dr. Dion Goddard MD.         PPE worn at all times washed before washed up afterwards disposed of everything properly is not within 6 feet of him for more than few minutes during my exam no  aerosols used at any point      Assessment and Plan:     Specifically this patient has hepatic encephalopathy and also a general metabolic encephalopathy related also to the pneumonia mild renal failure and hypoxemia.  At this point he does not move the right arm as much which apparently has been a problem for him before with pain in that extremity but he does have upgoing toes bilaterally which could indicate a stroke syndrome or cerebral edema.  Note the bilateral Babinski signs may also be an old finding.  I do not believe this patient is having seizure activity at this time.  So, lets start with a noncontrast MRI of the brain.  This will answer the question stroke or no stroke and if he has cerebral edema secondary to the hepatic encephalopathy that would show up as well.  Otherwise I think this patient has a mental status exam that is appropriate for his degree of metabolic derangement and hyperammonemia  Lastly, recall that hepatic encephalopathy is one of the only metabolic encephalopathies that can cause a focal neurologic deficit (such as his Babinski signs), without being a true stroke syndrome.    The nurse notes that he is going to need something to keep him from rocking his head in the MRI scanner.  I am going to avoid benzodiazepines to avoid any respiratory suppression and his wife did not like the effect of the Ativan previously.  His QT interval is absolutely normal.  He is going to get 2 mg of IV Haldol x1 and the benefits of this greatly outweigh any risk and I believe it is critical that we try to get him through this MRI scan to see whether or not we are dealing with stroke or cerebral edema.  Benefits outweigh risks    Finn Cazares MD  12/09/22  12:03 EST

## 2022-12-09 NOTE — PROGRESS NOTES
GI Daily Progress Note    Assessment/Plan:      Upper GI bleed    Metabolic encephalopathy    Hyperammonemia (HCC)    Chronic liver failure without hepatic coma (HCC)       LOS: 3 days     Lionel Whitehead is a 77 y.o. male who was admitted with Upper GI bleed. He reports the symptoms are unchanged. Continues to be encephalopathic and was sent to MRI but could not cooperate and was given sedation! Fecal management system in place as we continue lactulose, Wife was counseled. HGB stable and BUN decreased    Subjective:    Patient expresses No responsive  Patient denies No responsive    Objective:    Vital signs in last 24 hours:  Temp:  [97.4 °F (36.3 °C)-98.6 °F (37 °C)] 98.6 °F (37 °C)  Heart Rate:  [] 97  Resp:  [20-24] 20  BP: ()/(58-85) 147/73    Intake/Output last 3 shifts:  I/O last 3 completed shifts:  In: 8672.8 [I.V.:7932.8; Other:440; IV Piggyback:300]  Out: 2395 [Urine:2395]  Intake/Output this shift:  I/O this shift:  In: 80 [Other:80]  Out: 300 [Urine:300]    Physical Exam:Abdomen  Sounds Normal Active Bowel Sounds   Distension Soft   Tenderness Nontender     Imaging Results (Last 72 Hours)     Procedure Component Value Units Date/Time    MRI Brain Without Contrast [[id number]] Collected: 12/09/22 1346     Updated: 12/09/22 1349    Narrative:      MRI Brain WO    INDICATION:   Mental status change of uncertain cause. GI hemorrhage. Rule out stroke. Hepatic encephalopathy.    TECHNIQUE:   MRI of the brain without IV contrast.    COMPARISON:    Head CT 12/7/2022.    FINDINGS:  Study is motion degraded despite use of motion limiting sequences. There is no evidence for a recent infarct. There is mild generalized atrophy and mild white matter signal abnormality. There is no Chiari I malformation. There is no extra-axial fluid  collection. There is no gross evidence of abnormal mineral deposition in the basal ganglia (imaging evidence for hepatic encephalopathy). This certainly does not exclude that  diagnosis particularly given the limitations of the current study. There is no  MRI evidence for intracranial hemorrhage. The major arterial intracranial flow voids are grossly maintained at the base the brain. There is a small amount of fluid or inflammatory change in the right greater left side mastoid air cells and there is mild  paranasal sinus mucosal thickening.      Impression:        1. Study severely motion degraded. Allowing for this, there is no evidence for acute infarct. There is generalized atrophy and mild probable sequelae of small vessel disease.    Signer Name: Libby Tyler MD   Signed: 12/9/2022 1:46 PM   Workstation Name: [email or web address]    [facility] Abdomen Complete [[id number]] Resulted: 12/09/22 1249     Updated: 12/09/22 1255    XR Abdomen KUB [[id number]] Collected: 12/08/22 1418     Updated: 12/08/22 1423    Narrative:      XR ABDOMEN KUB-: 12/8/2022 1:53 PM     INDICATION:   NG tube placement     COMPARISON:   None available.     FINDINGS:  AP radiographs of the abdomen. There is an enteric tube present and the  tip is at the level of the mid body stomach. There are calcific  densities in the right midabdomen, one of which appears to represent a  renal or renal pelvic stone measuring 9 mm. Other smaller calcific  densities in the right upper quadrant are nonspecific but suspicious for  cholelithiasis. No suspicious calcifications in the pelvis. There is a  Yancey catheter present..     The bowel gas pattern is nonobstructive. No acute osseous abnormalities.  No radiopaque foreign body. Dextroscoliosis and secondary degenerative  change in the lumbar spine.       Impression:      1. Enteric tube tip at the level of the mid body stomach.  2. Probable right renal/pelvic stone and additional findings suspicious  for cholelithiasis.     This report was finalized on 12/8/2022 2:21 PM by Dr. Dion Goddard MD.       XR Chest 1 View [[id number]] Collected: 12/08/22 0656      Updated: 12/08/22 0658    Narrative:      CR Chest 1 Vw    INDICATION:   Increased secretions. Unspecified GI hemorrhage.     COMPARISON:    None available.    FINDINGS:  Portable AP view(s) of the chest. 2 films    Cardiac silhouette size is top normal.    There is elevation of the right hemidiaphragm that is new from prior with some right base airspace disease. Atelectasis, aspiration or early pneumonia in differential. There is also some patchy airspace disease in the right superior perihilar region also  new from prior. Follow-up to clearing recommended to exclude underlying pathology. There is no pneumothorax or pleural effusion. There is no acute congestive heart failure.      Impression:      Airspace disease right lung including the right lung base with some elevation the right hemidiaphragm, and more rounded right superior perihilar airspace disease. Please correlate for clinical evidence for aspiration or pneumonia and follow-up to  complete clearing is recommended to exclude underlying pathology    Signer Name: Libby Tyler MD   Signed: 12/8/2022 6:56 AM   [facility]: [email or web address]    [facility]    CT Head Without Contrast [[id number]] Collected: 12/07/22 2318     Updated: 12/07/22 2320    Narrative:      CT Head WO    HISTORY: Mental status change, persistent or worsening;Gastrointestinal hemorrhage, unspecified    COMPARISON: CT brain 12/7/2022.    TECHNIQUE: CT of the brain without IV contrast. Coronal and sagittal reconstructions were obtained.  Radiation dose reduction techniques included automated exposure control or exposure modulation based on body size. Count of known CT and cardiac nuc med  studies performed in previous 12 months: 0.     Time of Scan: 11:07 PM    FINDINGS:  Portions of the study are degraded by patient motion.    No acute intracranial hemorrhage, mass effect, midline shift, or hydrocephalus.     Gray-white matter differentiation is within normal limits.  Mild patchy hypodensities in the cerebral white matter, nonspecific but likely representing chronic microvascular ischemic changes.     Ventricles and cortical sulci are mildly prominent, in keeping with age-related volume loss. Basal cisterns are clear.     Calvarium is intact.     Visualized paranasal sinuses are clear. Visualized mastoid air cells are clear.      Impression:        1.  Motion degraded exam.  2.  Within these limits, no acute intracranial abnormality. No significant change compared to CT brain same day.    Signer Name: Paco Mckeon MD   Signed: 12/7/2022 11:18 PM   Workstation Name: [id number]    [facility]    CT Head Without Contrast [[id number]] Collected: 12/07/22 0750     Updated: 12/07/22 0752    Narrative:      CT Head WO    HISTORY:   Acute onset of altered mental status    TECHNIQUE:   Axial unenhanced head CT. Radiation dose reduction techniques included automated exposure control or exposure modulation based on body size. Count of known CT and cardiac nuc med studies performed in previous 12 months: 0.     Time of scan: 7:43 AM    COMPARISON:   None.    FINDINGS:     The ventricles are generous in size. Cortical sulci are correspondingly prominent. No extraaxial fluid collections are seen.    No parenchymal or subarachnoid hemorrhage is present. Periventricular hypodensities are demonstrated which are nonspecific but likely represent white matter microvascular change in a patient of this age. No CT evidence of mass or acute infarct.    The mastoid air cells are clear. The visualized paranasal sinuses are clear.  Orbital structures demonstrate no acute findings.      Impression:      Impression:  1. No clearly acute intracranial pathology demonstrated.    2. Generalized cerebral atrophy and nonspecific periventricular hypodensities which are thought to represent white matter microvascular change.    Signer Name: Andrews Cabrera MD   Signed: 12/7/2022 7:50 AM    Workstation Name: [id number]    [facility]          WBC   Date Value Ref Range Status   12/07/2022 15.92 (H) 3.40 - 10.80 10*3/mm3 Final     RBC   Date Value Ref Range Status   12/07/2022 2.87 (L) 4.14 - 5.80 10*6/mm3 Final     Hemoglobin   Date Value Ref Range Status   12/08/2022 8.6 (L) 13.0 - 17.7 g/dL Final     Hematocrit   Date Value Ref Range Status   12/08/2022 27.3 (L) 37.5 - 51.0 % Final     MCV   Date Value Ref Range Status   12/07/2022 97.9 (H) 79.0 - 97.0 fL Final     MCH   Date Value Ref Range Status   12/07/2022 32.1 26.6 - 33.0 pg Final     MCHC   Date Value Ref Range Status   12/07/2022 32.7 31.5 - 35.7 g/dL Final     RDW   Date Value Ref Range Status   12/07/2022 16.8 (H) 12.3 - 15.4 % Final     RDW-SD   Date Value Ref Range Status   12/07/2022 58.4 (H) 37.0 - 54.0 fl Final     MPV   Date Value Ref Range Status   12/07/2022 12.8 (H) 6.0 - 12.0 fL Final     Platelets   Date Value Ref Range Status   12/07/2022 88 (L) 140 - 450 10*3/mm3 Final     Neutrophil %   Date Value Ref Range Status   12/07/2022 86.0 (H) 42.7 - 76.0 % Final     Lymphocyte %   Date Value Ref Range Status   12/07/2022 4.6 (L) 19.6 - 45.3 % Final     Monocyte %   Date Value Ref Range Status   12/07/2022 7.7 5.0 - 12.0 % Final     Eosinophil %   Date Value Ref Range Status   12/07/2022 0.0 (L) 0.3 - 6.2 % Final     Basophil %   Date Value Ref Range Status   12/07/2022 0.2 0.0 - 1.5 % Final     Immature Grans %   Date Value Ref Range Status   12/07/2022 1.5 (H) 0.0 - 0.5 % Final     Neutrophils, Absolute   Date Value Ref Range Status   12/07/2022 13.69 (H) 1.70 - 7.00 10*3/mm3 Final     Lymphocytes, Absolute   Date Value Ref Range Status   12/07/2022 0.73 0.70 - 3.10 10*3/mm3 Final     Monocytes, Absolute   Date Value Ref Range Status   12/07/2022 1.23 (H) 0.10 - 0.90 10*3/mm3 Final     Eosinophils, Absolute   Date Value Ref Range Status   12/07/2022 0.00 0.00 - 0.40 10*3/mm3 Final     Basophils, Absolute    Date Value Ref Range Status   12/07/2022 0.03 0.00 - 0.20 10*3/mm3 Final     Immature Grans, Absolute   Date Value Ref Range Status   12/07/2022 0.24 (H) 0.00 - 0.05 10*3/mm3 Final     nRBC   Date Value Ref Range Status   12/07/2022 0.6 (H) 0.0 - 0.2 /100 WBC Final       Glucose   Date Value Ref Range Status   12/08/2022 155 (H) 65 - 99 mg/dL Final     Sodium   Date Value Ref Range Status   12/08/2022 144 136 - 145 mmol/L Final     Potassium   Date Value Ref Range Status   12/08/2022 4.2 3.5 - 5.2 mmol/L Final     CO2   Date Value Ref Range Status   12/08/2022 19.0 (L) 22.0 - 29.0 mmol/L Final     Chloride   Date Value Ref Range Status   12/08/2022 112 (H) 98 - 107 mmol/L Final     Anion Gap   Date Value Ref Range Status   12/08/2022 13.0 5.0 - 15.0 mmol/L Final     Creatinine   Date Value Ref Range Status   12/08/2022 1.50 (H) 0.76 - 1.27 mg/dL Final     BUN   Date Value Ref Range Status   12/08/2022 85 (H) 8 - 23 mg/dL Final     BUN/Creatinine Ratio   Date Value Ref Range Status   12/08/2022 56.7 (H) 7.0 - 25.0 Final     Calcium   Date Value Ref Range Status   12/08/2022 8.0 (L) 8.6 - 10.5 mg/dL Final     Alkaline Phosphatase   Date Value Ref Range Status   12/08/2022 360 (H) 39 - 117 U/L Final     Total Protein   Date Value Ref Range Status   12/08/2022 4.9 (L) 6.0 - 8.5 g/dL Final     ALT (SGPT)   Date Value Ref Range Status   12/08/2022 2,610 (H) 1 - 41 U/L Final     AST (SGOT)   Date Value Ref Range Status   12/08/2022 362 (H) 1 - 40 U/L Final     Total Bilirubin   Date Value Ref Range Status   12/08/2022 1.8 (H) 0.0 - 1.2 mg/dL Final     Albumin   Date Value Ref Range Status   12/08/2022 2.80 (L) 3.50 - 5.20 g/dL Final     Globulin   Date Value Ref Range Status   12/08/2022 2.1 gm/dL Final     HE  Variceal Bleed S/P EGD w banding 12/6  Alcoholic Cirrhosis  Thrombocytopenia    Still encephalopathic but again received ativan for MRI which as above is negative, continue aggressive lactulose and follow HGB ON  Octreotide

## 2022-12-09 NOTE — SIGNIFICANT NOTE
12/09/22 1548   Rehab Evaluation   Session Not Performed unable to evaluate, medical status change   Evaluation Not Performed, Comment Spoke with RN, Niurka, pt continues wtih minimal responsiveness and inappropriate for swallow eval at this time. Will call tomorrow to check status.

## 2022-12-09 NOTE — PLAN OF CARE
Goal Outcome Evaluation:              Outcome Evaluation: Pt continues to be responsive to painful stimulation only at this time, unable to follow commands. Pt had 6 black liquid stools overnight, bowel sounds hyperactive, F/C output acceptable. Pt had increased cardiac ectopy, multifocal PVC's and a 30 beat run of SVT. EKG and labs done overnight, unchanged from previous. VSS stable, nasal trumpet in place, sats upper 90's on RA.

## 2022-12-09 NOTE — CASE MANAGEMENT/SOCIAL WORK
Continued Stay Note  BOUBACAR Dawson     Patient Name: Lionel Whitehead  MRN: [id number]  Today's Date: 12/9/2022    Admit Date: 12/5/2022    Plan: from home with wife   Discharge Plan     Row Name 12/09/22 1327       Plan    Plan from home with wife    Plan Comments Chart reviewed, noted change in mental status and Neuro eval - MRI to evaluate for stroke. CM will continue to follow for any dc needs.               Discharge Codes    No documentation.                     Matt Lee RN

## 2022-12-09 NOTE — PROGRESS NOTES
Adult Nutrition  Assessment/PES    Patient Name:  Lionel Whitehead  YOB: 1945  MRN: [id number]  Admit Date:  12/5/2022    Assessment Date:  12/9/2022    Comments:  Adv diet as clinically indicated pending john status.  Will cont to follow and monitor.      Reason for Assessment     Row Name 12/09/22 1337          Reason for Assessment    Reason For Assessment per organizational policy  clears x 3     Diagnosis liver disease;neurologic conditions;hematological/related complications  Hepatic Enceph, Work up CVA, Hemataemesis, ABLA hx ETOH varices                Nutrition/Diet History     Row Name 12/09/22 1338          Nutrition/Diet History    Typical Intake (Food/Fluid/EN/PN) Pt with john GOFF at visit to unit                Labs/Tests/Procedures/Meds     Row Name 12/09/22 1338          Labs/Procedures/Meds    Lab Results Reviewed reviewed     Lab Results Comments Nh3 126 H ( down from 174), tbili 1.8 H, glu 155, 170, BUn 85/creat 1.5 H, ALT 2610 H        Diagnostic Tests/Procedures    Diagnostic Test/Procedure Reviewed reviewed     Diagnostic Test/Procedures Comments SLP        Medications    Pertinent Medications Reviewed reviewed     Pertinent Medications Comments lactulose, sandostatin                  Estimated/Assessed Needs - Anthropometrics     Row Name 12/09/22 1339          Anthropometrics    Weight --  227.7#     Additional Documentation --  6/2022 247# down 19 # in past 6 months, 7,.8%        Estimated/Assessed Needs    Additional Documentation Estimated Calorie Needs (Group);Fluid Requirements (Group);Protein Requirements (Group)        Estimated Calorie Needs    Estimated Calorie Requirement (kcal/day) 2188 kcal ( mifflinin 1.2 )     Estimated Calorie Need Method [place]        Protein Requirements    Est Protein Requirement Amount (gms/kg) 0.8 gm protein  82 gm upward of 103 gm once NH3 normalized        Fluid Requirements    Estimated Fluid Requirement Method RDA Method  2188 ml                 Nutrition Prescription Ordered     Row Name 12/09/22 1341          Nutrition Prescription PO    Current PO Diet Clear Liquid                Evaluation of Received Nutrient/Fluid Intake     Row Name 12/09/22 1341          Fluid Intake Evaluation    Other Fluid (mL) 7933  >100%        PO Evaluation    Number of Days PO Intake Evaluated Insufficient Data                   Problem/Interventions:   Problem 1     Row Name 12/09/22 1342          Nutrition Diagnoses Problem 1    Problem 1 Altered GI Function     Etiology (related to) Medical Diagnosis;Factors Affecting Nutrition     Signs/Symptoms (evidenced by) Report/Observation;Clear Liquid Diet                      Intervention Goal     Row Name 12/09/22 1342          Intervention Goal    General Meet nutritional needs for age/condition     PO Advance diet;Establish PO  once clinically appropriate tolerate po intake                Nutrition Intervention     Row Name 12/09/22 1343          Nutrition Intervention    RD/Tech Action Follow Tx progress                  Education/Evaluation     Row Name 12/09/22 1343          Education    Education Education not appropriate at this time        Monitor/Evaluation    Monitor Per protocol;I&O;PO intake;Pertinent labs;Weight;Symptoms                 Electronically signed by:  Alejandra Chacon RD  12/09/22 13:43 EST

## 2022-12-10 NOTE — PROGRESS NOTES
SERVICE: [facility] HOSPITALIST    CONSULTANTS: GI, Neurology    CHIEF COMPLAINT: f/u AMS, anemia, GI bleed    SUBJECTIVE: Patient seen and examined at bedside.  Patient appearing uncomfortable raising legs up in the bed repeatedly.  Does not respond to questioning, does not follow commands.  Nursing staff reports great difficulty in keeping patient from pulling at lines even with soft restraints in place now.  We discussed need to avoid sedation due to altered mental status.  Nursing staff agreeable.  We discussed one-time dose of gabapentin to assist with restless leg syndrome, as this is likely least sedating option at this time that may actually help the patient.  Nursing staff providing gabapentin via NG tube at this time.  No other acute issues.     OBJECTIVE:    Physical exam is largely unchanged from previous exam, except where documented below, examination is accurate as of 12/10/2022    Physical Exam:  General: Patient is awake but obviously confused and does not respond to verbal stimuli or follow commands.  Consistently raising legs up in the bed writhing around.   HENT: Head is atraumatic, normocephalic.. Nose is without obvious congestion and appears patent. Neck is supple and trachea is midline.   Eyes:  Pupils appear equal and round.   Cardiovascular: Heart has regular rate and rhythm although tachycardic with no murmurs, rubs or gallops noted.   Respiratory: Lungs are clear to ausculation without wheezes, rhonchi or rales.   Abdominal/GI: Soft, nontender, bowel sounds present. No rebound or guarding present.   Extremities: No peripheral edema noted.   Musculoskeletal: Spontaneous movement of bilateral upper and lower extremities against gravity noted. No signs of injury or deformity noted.   Skin: Warm and dry.   Psych: Unable to fully assess due to altered mental status, cooperative with exam.   Neuro: No facial asymmetry noted.  He moves all 4 limbs against gravity without  "difficulty.     BP (!) 133/108   Pulse 89   Temp 97.4 °F (36.3 °C) (Axillary)   Resp 20   Ht 185.4 cm (73\")   Wt 103 kg (227 lb 11.8 oz)   SpO2 95%   BMI 30.05 kg/m²     MEDS/LABS REVIEWED AND ORDERED    lactulose, 20 g, Nasogastric, Q4H  piperacillin-tazobactam, 4.5 g, Intravenous, Q8H  sodium chloride, 10 mL, Intravenous, Q12H  vancomycin, 15 mg/kg, Intravenous, Q24H          LAB/DIAGNOSTICS:    Lab Results (last 24 hours)     Procedure Component Value Units Date/Time    Blood Culture - Blood, Hand, Right [[id number]]  (Normal) Collected: 12/08/22 0205    Specimen: Blood from Hand, Right Updated: 12/10/22 0215     Blood Culture No growth at 2 days    Narrative:      Less than seven (7) mL's of blood was collected.  Insufficient quantity may yield false negative results.    Blood Culture - Blood, Hand, Right [[id number]]  (Normal) Collected: 12/08/22 0205    Specimen: Blood from Hand, Right Updated: 12/10/22 0215     Blood Culture No growth at 2 days    Narrative:      Less than seven (7) mL's of blood was collected.  Insufficient quantity may yield false negative results.    MRSA Screen, PCR (Inpatient) - Swab, Nares [[id number]]  (Normal) Collected: 12/09/22 1326    Specimen: Swab from Nares Updated: 12/09/22 1733     MRSA PCR No MRSA Detected    Narrative:      The negative predictive value of this diagnostic test is high and should only be used to consider de-escalating anti-MRSA therapy. A positive result may indicate colonization with MRSA and must be correlated clinically.    aPTT [[id number]]  (Normal) Collected: 12/09/22 1326    Specimen: Blood Updated: 12/09/22 1349     PTT 26.3 seconds     Narrative:      PTT = The equivalent PTT values for the therapeutic range of heparin levels at 0.1 to 0.7 U/ml are 53 to 110 seconds.      Protime-INR [[id number]]  (Abnormal) Collected: 12/09/22 1326    Specimen: Blood Updated: 12/09/22 1349     Protime 20.1 Seconds      INR 1.68    Narrative:      Therapeutic " Ranges for INR: 2.0-3.0 (PT 20-30)                              2.5-3.5 (PT 25-34)    Urinalysis, Microscopic Only - Straight Cath [[id number]]  (Abnormal) Collected: 12/09/22 1326    Specimen: Urine from Straight Cath Updated: 12/09/22 1346     RBC, UA 6-12 /HPF      WBC, UA 3-5 /HPF      Comment: Urine culture not indicated.        Bacteria, UA None Seen /HPF      Squamous Epithelial Cells, UA 0-2 /HPF      Hyaline Casts, UA None Seen /LPF      Methodology Manual Light Microscopy    Urinalysis With Culture If Indicated - Straight Cath [[id number]]  (Abnormal) Collected: 12/09/22 1326    Specimen: Urine from Straight Cath Updated: 12/09/22 1344     Color, UA Yellow     Appearance, UA Clear     pH, UA 5.5     Specific Gravity, UA 1.020     Glucose, UA Negative     Ketones, UA Negative     Bilirubin, UA Negative     Blood, UA Large (3+)     Protein, UA 30 mg/dL (1+)     Leuk Esterase, UA Trace     Nitrite, UA Negative     Urobilinogen, UA 0.2 E.U./dL    Narrative:      In absence of clinical symptoms, the presence of pyuria, bacteria, and/or nitrites on the urinalysis result does not correlate with infection.    Blood Gas, Arterial - [[id number]]  (Abnormal) Collected: 12/09/22 1105    Specimen: Arterial Blood Updated: 12/09/22 1107     Site Right Brachial     Yunior's Test N/A     pH, Arterial 7.465 pH units      Comment: 83 Value above reference range        pCO2, Arterial 29.0 mm Hg      Comment: 84 Value below reference range        pO2, Arterial 67.5 mm Hg      Comment: 84 Value below reference range        HCO3, Arterial 20.8 mmol/L      Base Excess, Arterial -2.4 mmol/L      Comment: 84 Value below reference range        O2 Saturation, Arterial 93.7 %      Comment: 84 Value below reference range        Hemoglobin, Blood Gas 8.2 g/dL      Comment: 84 Value below reference range        Temperature 37.0 C      Barometric Pressure for Blood Gas 741 mmHg      Modality RA     Collected by [id number]     Comment: Meter:  [id number]     :  [id number]        pCO2, Temperature Corrected 29.0 mm Hg      pH, Temp Corrected 7.465 pH Units      pO2, Temperature Corrected 67.5 mm Hg         ECG 12 Lead Rhythm Change   Final Result   HEART RATE= 103  bpm   RR Interval= 600  ms   OR Interval= 149  ms   P Horizontal Axis= 13  deg   P Front Axis= 25  deg   QRSD Interval= 76  ms   QT Interval= 354  ms   QRS Axis= -24  deg   T Wave Axis= 51  deg   - ABNORMAL ECG -   Sinus tachycardia   Paired ventricular premature complexes   Inferior infarct, old   now pvcs and pacs   Electronically Signed By: Deirdre Rg ([facility]) 09-Dec-2022 12:01:05   Date and Time of Study: 2022-12-08 23:59:26      ECG 12 Lead Altered Mental Status   Final Result   HEART RATE= 96  bpm   RR Interval= 620  ms   OR Interval= 155  ms   P Horizontal Axis= 20  deg   P Front Axis= 28  deg   QRSD Interval= 85  ms   QT Interval= 351  ms   QRS Axis= -11  deg   T Wave Axis= 13  deg   - ABNORMAL ECG -   Sinus rhythm   Inferior infarct, old   NO PRIOR TRACING AVAILABLE FOR COMPARISON   Electronically Signed By: Eugene Polo ([facility]) 06-Dec-2022 07:38:49   Date and Time of Study: 2022-12-06 00:34:09          MRI Brain Without Contrast    Result Date: 12/9/2022  1. Study severely motion degraded. Allowing for this, there is no evidence for acute infarct. There is generalized atrophy and mild probable sequelae of small vessel disease. Signer Name: Libby Tyler MD  Signed: 12/9/2022 1:46 PM  Workstation Name: [email or web address]  [facility] Abdomen Complete    Result Date: 12/9/2022  1.  Technically difficult exam due to patient condition and patient body habitus per technologist report. 2.  Enlarged liver with coarse echotexture and nodularity likely reflecting underlying liver disease. 3.  Splenomegaly with small volume ascites. 4.  Cholelithiasis with gallbladder wall thickening. Unable to assess for sonographic Graves's due to patient sedation. 5.  No  evidence of hydronephrosis. No definite renal calculi although evaluation is limited. 6.  Infrarenal abdominal aortic aneurysm measuring up to 3.4 cm. Signer Name: Paco Mckeon MD  Signed: 12/9/2022 5:11 PM  Workstation Name: [id number]  [facility]    XR Chest 1 View    Result Date: 12/8/2022  Airspace disease right lung including the right lung base with some elevation the right hemidiaphragm, and more rounded right superior perihilar airspace disease. Please correlate for clinical evidence for aspiration or pneumonia and follow-up to complete clearing is recommended to exclude underlying pathology Signer Name: Libby Tyler MD  Signed: 12/8/2022 6:56 AM  Workstation Name: [id number]  [facility]    XR Abdomen KUB    Result Date: 12/8/2022  1. Enteric tube tip at the level of the mid body stomach. 2. Probable right renal/pelvic stone and additional findings suspicious for cholelithiasis.  This report was finalized on 12/8/2022 2:21 PM by Dr. Dion Goddard MD.          ASSESSMENT/PLAN:  Please not portions of this assessment/plan may have been copied and pasted, but I have personally seen this patient and reviewed each line of this assessment and plan for accuracy and made updates to reflect my necessary changes on 12/10/2022     Hepatic encephalopathy   -transaminitis, US shows enlarged liver reflecting liver disease, cholelithiasis with wall thickening.   -GI continues to follow. MRI brain negative for stroke. Avoid sedating medications.   -continue lactulose, fecal management system in place.   -NG tube in place.   -Neurology assisting as well.     Suspected aspiration pneumonia  -procalcitionin elevated, with encephalopathy unable to fully assess for aspiration, NG tube now in place  -MRSA screen negative, discontinue vancomycin to protect renal function.   -continue zosyn  -lactate improved with IVF  -continue aspiration precautions, speech following, on clear  "liquids.     Esophageal and gastric variceal bleeding resulting in acute blood loss anemia  -banding performed with EGD on 12/6/22  -continue protonix and octreotide drips  -no additional signs of bleeding, HGB stable, will repeat H and H today.     RLS-hold gabapentin due to sedation.     PLAN FOR DISPOSITION: RUPESH Mota DO  Hospitalist, Psychiatric  12/10/22  02:36 EST    At [facility], we believe that sharing information builds trust and better relationships. You are receiving this note because you recently visited [facility]. It is possible you will see health information before a provider has talked with you about it. This kind of information can be easy to misunderstand. To help you fully understand what it means for your health, we urge you to discuss this note with your provider.    \"Dictated utilizing Dragon dictation\"    "

## 2022-12-10 NOTE — PROGRESS NOTES
GI Daily Progress Note    Assessment/Plan:      Upper GI bleed    Metabolic encephalopathy    Hyperammonemia (HCC)    Chronic liver failure without hepatic coma (HCC)       LOS: 4 days     Lionel Whitehead is a 77 y.o. male who was admitted with Upper GI bleed. He reports the symptoms are unchanged. His LFTs ae improved and c/w Hpoxic liver injury that has contributed to his prolonged Encephalopathy. He opens eye to voice and no verbal response yet. Sodium noted. NGT still in and receiving his Lactulose. Discussed with Wife     Subjective:    Patient expresses Non verbal  Patient denies Non verbal    Objective:    Vital signs in last 24 hours:  Temp:  [97.4 °F (36.3 °C)-98.8 °F (37.1 °C)] 98.8 °F (37.1 °C)  Heart Rate:  [] 97  Resp:  [17-24] 19  BP: (119-188)/() 136/84    Intake/Output last 3 shifts:  I/O last 3 completed shifts:  In: 6595 [I.V.:5445; Other:750; IV Piggyback:400]  Out: 4120 [Urine:2820; Stool:1300]  Intake/Output this shift:  I/O this shift:  In: 90 [Other:90]  Out: 1075 [Urine:575; Stool:500]    Physical Exam:Abdomen  Sounds Normal Active Bowel Sounds   Distension Soft   Tenderness Nontender     Imaging Results (Last 72 Hours)     Procedure Component Value Units Date/Time    US Abdomen Complete [[id number]] Collected: 12/09/22 1711     Updated: 12/09/22 1713    Narrative:      US Abdomen Complete    INDICATION:   Abnormal findings on plain film, elevated transaminases;Gastrointestinal hemorrhage, unspecified    COMPARISON:   None available.    FINDINGS:    Technically difficult exam due to patient condition and patient body habitus per technologist report.    PANCREAS: The pancreas is obscured due to overlying bowel gas.    LIVER: Enlarged liver measuring 20.5 cm with coarse echotexture and nodularity.     COMMON BILE DUCT: The common duct is normal in size at the zafar hepatis measuring 5 mm.    GALLBLADDER: Small layering gallstones with gallbladder wall thickening measuring up to 5.6  mm.    KIDNEYS: The right kidney measures 11.5 cm. The left kidney measures 11.8 cm. No renal calculi are identified although evaluation is limited.    SPLEEN: The spleen is enlarged measuring 16.5 cm.      OTHER: Infrarenal abdominal aorta measures 3.4 cm in maximal dimension. IVC is not visualized. Small volume ascites..      Impression:        1.  Technically difficult exam due to patient condition and patient body habitus per technologist report.  2.  Enlarged liver with coarse echotexture and nodularity likely reflecting underlying liver disease.  3.  Splenomegaly with small volume ascites.  4.  Cholelithiasis with gallbladder wall thickening. Unable to assess for sonographic Graves's due to patient sedation.  5.  No evidence of hydronephrosis. No definite renal calculi although evaluation is limited.  6.  Infrarenal abdominal aortic aneurysm measuring up to 3.4 cm.    Signer Name: Paco Mckeon MD   Signed: 12/9/2022 5:11 PM   Workstation Name: [id number]    [facility]    MRI Brain Without Contrast [[id number]] Collected: 12/09/22 1346     Updated: 12/09/22 1349    Narrative:      MRI Brain WO    INDICATION:   Mental status change of uncertain cause. GI hemorrhage. Rule out stroke. Hepatic encephalopathy.    TECHNIQUE:   MRI of the brain without IV contrast.    COMPARISON:    Head CT 12/7/2022.    FINDINGS:  Study is motion degraded despite use of motion limiting sequences. There is no evidence for a recent infarct. There is mild generalized atrophy and mild white matter signal abnormality. There is no Chiari I malformation. There is no extra-axial fluid  collection. There is no gross evidence of abnormal mineral deposition in the basal ganglia (imaging evidence for hepatic encephalopathy). This certainly does not exclude that diagnosis particularly given the limitations of the current study. There is no  MRI evidence for intracranial hemorrhage. The major arterial intracranial flow voids  are grossly maintained at the base the brain. There is a small amount of fluid or inflammatory change in the right greater left side mastoid air cells and there is mild  paranasal sinus mucosal thickening.      Impression:        1. Study severely motion degraded. Allowing for this, there is no evidence for acute infarct. There is generalized atrophy and mild probable sequelae of small vessel disease.    Signer Name: Libby Tyler MD   Signed: 12/9/2022 1:46 PM   [facility]: [email or web address]    [facility]    XR Abdomen KUB [[id number]] Collected: 12/08/22 1418     Updated: 12/08/22 1423    Narrative:      XR ABDOMEN KUB-: 12/8/2022 1:53 PM     INDICATION:   NG tube placement     COMPARISON:   None available.     FINDINGS:  AP radiographs of the abdomen. There is an enteric tube present and the  tip is at the level of the mid body stomach. There are calcific  densities in the right midabdomen, one of which appears to represent a  renal or renal pelvic stone measuring 9 mm. Other smaller calcific  densities in the right upper quadrant are nonspecific but suspicious for  cholelithiasis. No suspicious calcifications in the pelvis. There is a  Yancey catheter present..     The bowel gas pattern is nonobstructive. No acute osseous abnormalities.  No radiopaque foreign body. Dextroscoliosis and secondary degenerative  change in the lumbar spine.       Impression:      1. Enteric tube tip at the level of the mid body stomach.  2. Probable right renal/pelvic stone and additional findings suspicious  for cholelithiasis.     This report was finalized on 12/8/2022 2:21 PM by Dr. Dion Goddard MD.       XR Chest 1 View [[id number]] Collected: 12/08/22 0656     Updated: 12/08/22 0658    Narrative:      CR Chest 1 Vw    INDICATION:   Increased secretions. Unspecified GI hemorrhage.     COMPARISON:    None available.    FINDINGS:  Portable AP view(s) of the chest. 2 films    Cardiac silhouette size is top  normal.    There is elevation of the right hemidiaphragm that is new from prior with some right base airspace disease. Atelectasis, aspiration or early pneumonia in differential. There is also some patchy airspace disease in the right superior perihilar region also  new from prior. Follow-up to clearing recommended to exclude underlying pathology. There is no pneumothorax or pleural effusion. There is no acute congestive heart failure.      Impression:      Airspace disease right lung including the right lung base with some elevation the right hemidiaphragm, and more rounded right superior perihilar airspace disease. Please correlate for clinical evidence for aspiration or pneumonia and follow-up to  complete clearing is recommended to exclude underlying pathology    Signer Name: Libby Tyler MD   Signed: 12/8/2022 6:56 AM   Workstation Name: [email or web address]    [facility]    CT Head Without Contrast [[id number]] Collected: 12/07/22 2318     Updated: 12/07/22 2320    Narrative:      CT Head WO    HISTORY: Mental status change, persistent or worsening;Gastrointestinal hemorrhage, unspecified    COMPARISON: CT brain 12/7/2022.    TECHNIQUE: CT of the brain without IV contrast. Coronal and sagittal reconstructions were obtained.  Radiation dose reduction techniques included automated exposure control or exposure modulation based on body size. Count of known CT and cardiac nuc med  studies performed in previous 12 months: 0.     Time of Scan: 11:07 PM    FINDINGS:  Portions of the study are degraded by patient motion.    No acute intracranial hemorrhage, mass effect, midline shift, or hydrocephalus.     Gray-white matter differentiation is within normal limits. Mild patchy hypodensities in the cerebral white matter, nonspecific but likely representing chronic microvascular ischemic changes.     Ventricles and cortical sulci are mildly prominent, in keeping with age-related volume loss. Basal cisterns  are clear.     Calvarium is intact.     Visualized paranasal sinuses are clear. Visualized mastoid air cells are clear.      Impression:        1.  Motion degraded exam.  2.  Within these limits, no acute intracranial abnormality. No significant change compared to CT brain same day.    Signer Name: Paco Mckeon MD   Signed: 12/7/2022 11:18 PM   Workstation Name: [id number]    [facility] [place] [facility]          WBC   Date Value Ref Range Status   12/10/2022 10.56 3.40 - 10.80 10*3/mm3 Final     RBC   Date Value Ref Range Status   12/10/2022 2.59 (L) 4.14 - 5.80 10*6/mm3 Final     Hemoglobin   Date Value Ref Range Status   12/10/2022 8.0 (L) 13.0 - 17.7 g/dL Final     Hematocrit   Date Value Ref Range Status   12/10/2022 26.4 (L) 37.5 - 51.0 % Final     MCV   Date Value Ref Range Status   12/10/2022 101.9 (H) 79.0 - 97.0 fL Final     MCH   Date Value Ref Range Status   12/10/2022 30.9 26.6 - 33.0 pg Final     MCHC   Date Value Ref Range Status   12/10/2022 30.3 (L) 31.5 - 35.7 g/dL Final     RDW   Date Value Ref Range Status   12/10/2022 18.2 (H) 12.3 - 15.4 % Final     RDW-SD   Date Value Ref Range Status   12/10/2022 65.3 (H) 37.0 - 54.0 fl Final     MPV   Date Value Ref Range Status   12/10/2022 12.4 (H) 6.0 - 12.0 fL Final     Platelets   Date Value Ref Range Status   12/10/2022 74 (L) 140 - 450 10*3/mm3 Final     Neutrophil %   Date Value Ref Range Status   12/10/2022 84.3 (H) 42.7 - 76.0 % Final     Lymphocyte %   Date Value Ref Range Status   12/10/2022 4.2 (L) 19.6 - 45.3 % Final     Monocyte %   Date Value Ref Range Status   12/10/2022 10.1 5.0 - 12.0 % Final     Eosinophil %   Date Value Ref Range Status   12/10/2022 0.0 (L) 0.3 - 6.2 % Final     Basophil %   Date Value Ref Range Status   12/10/2022 0.0 0.0 - 1.5 % Final     Immature Grans %   Date Value Ref Range Status   12/10/2022 1.4 (H) 0.0 - 0.5 % Final     Neutrophils, Absolute   Date Value Ref Range Status   12/10/2022 8.90 (H) 1.70 - 7.00  10*3/mm3 Final     Lymphocytes, Absolute   Date Value Ref Range Status   12/10/2022 0.44 (L) 0.70 - 3.10 10*3/mm3 Final     Monocytes, Absolute   Date Value Ref Range Status   12/10/2022 1.07 (H) 0.10 - 0.90 10*3/mm3 Final     Eosinophils, Absolute   Date Value Ref Range Status   12/10/2022 0.00 0.00 - 0.40 10*3/mm3 Final     Basophils, Absolute   Date Value Ref Range Status   12/10/2022 0.00 0.00 - 0.20 10*3/mm3 Final     Immature Grans, Absolute   Date Value Ref Range Status   12/10/2022 0.15 (H) 0.00 - 0.05 10*3/mm3 Final       Glucose   Date Value Ref Range Status   12/10/2022 171 (H) 65 - 99 mg/dL Final     Sodium   Date Value Ref Range Status   12/10/2022 155 (H) 136 - 145 mmol/L Final     Potassium   Date Value Ref Range Status   12/10/2022 3.5 3.5 - 5.2 mmol/L Final     CO2   Date Value Ref Range Status   12/10/2022 19.6 (L) 22.0 - 29.0 mmol/L Final     Chloride   Date Value Ref Range Status   12/10/2022 124 (H) 98 - 107 mmol/L Final     Anion Gap   Date Value Ref Range Status   12/10/2022 11.4 5.0 - 15.0 mmol/L Final     Creatinine   Date Value Ref Range Status   12/10/2022 1.19 0.76 - 1.27 mg/dL Final     BUN   Date Value Ref Range Status   12/10/2022 65 (H) 8 - 23 mg/dL Final     BUN/Creatinine Ratio   Date Value Ref Range Status   12/10/2022 54.6 (H) 7.0 - 25.0 Final     Calcium   Date Value Ref Range Status   12/10/2022 8.1 (L) 8.6 - 10.5 mg/dL Final     Alkaline Phosphatase   Date Value Ref Range Status   12/10/2022 323 (H) 39 - 117 U/L Final     Total Protein   Date Value Ref Range Status   12/10/2022 5.1 (L) 6.0 - 8.5 g/dL Final     ALT (SGPT)   Date Value Ref Range Status   12/10/2022 1,480 (H) 1 - 41 U/L Final     AST (SGOT)   Date Value Ref Range Status   12/10/2022 87 (H) 1 - 40 U/L Final     Total Bilirubin   Date Value Ref Range Status   12/10/2022 1.6 (H) 0.0 - 1.2 mg/dL Final     Albumin   Date Value Ref Range Status   12/10/2022 2.80 (L) 3.50 - 5.20 g/dL Final     Globulin   Date Value Ref  Range Status   12/10/2022 2.3 gm/dL Final     HE  Variceal Bleed S/P EGD w banding 12/6  Shock liver  Alcoholic Cirrhosis  Thrombocytopenia    Only minimal improvement but as his LFTs imporve and we continue lactulose I anticipate his MSE will follow- no signs of further bleeding. And his BUN is down from 80 to 60s. Discussed all with Wife at Bedside.

## 2022-12-10 NOTE — PLAN OF CARE
Goal Outcome Evaluation:              Outcome Evaluation: VSS;  Fecal management system placed 500 output;  F/C;  Sinus tach w/ pvc's - hr low 90's.  started vanc iv; cont on octreatide and protonix gtts; nasal trumpet in left nare and ng in rt nare.  currently RA, 94%.  mri, stroke ruled out.  XRAY OF RT SHOULDER NEEDS TO BE DONE TOMORROW, DUE TO FALL AT HOME BEFORE ADMIT.  dilauded given today for pt moaning with pain.

## 2022-12-10 NOTE — SIGNIFICANT NOTE
12/10/22 1233   Rehab Evaluation   Session Not Performed unable to evaluate, medical status change   Evaluation Not Performed, Comment Spoke with ABEL Durbin, via phone x2. Pt continues to not be alert enough to participate in a clinical swallow eval. Will check tomorrow.

## 2022-12-10 NOTE — CONSULTS
Follow-up visit with patient and wife. He was visually responsive but unable to verbally respond. His wife seemed appreciative of the visit and supportive conversation.

## 2022-12-10 NOTE — PLAN OF CARE
Goal Outcome Evaluation:              Outcome Evaluation: VS stable, pt agitated overnight, pulled out several PIV, restraints applied, pt attempting to get out of bed, not following commands or answering questions but will look at staff when name called. fecal mgmt and F/C output adequate, stool appears less dark today but continues to be watery liquid.. nasal trumpet and NG in place no issues. Pt to get shoulder x-ray and PICC line placed today.

## 2022-12-11 NOTE — NURSING NOTE
1600: Patient tolerating being out of restraints. 1:1 sitter remained at bedside. VSS. NAD noted. See flowsheet for I& O's.       1753: Patient pulled out NGT. Dr. Carrera made aware

## 2022-12-11 NOTE — PLAN OF CARE
Goal Outcome Evaluation:  Plan of Care Reviewed With: patient        Progress: no change  Outcome Evaluation: SLP continues to follow for dysphagia evaluation. Orginal orders received 12.8 with attempts 12.8, 12.9, 12.10, 12.11 all deeming pt not appropriate, poorly responsive. Information obtained from ABEL William, chart review, and visualization of the pt. Continues as poorly responsive with swallow evaluation not appropriate this date. Will hold, monitor for ability to participate appropriately next date.

## 2022-12-11 NOTE — CASE MANAGEMENT/SOCIAL WORK
Continued Stay Note  BOUBACAR Dawson     Patient Name: Lionel Whitehead  MRN: [id number]  Today's Date: 12/11/2022    Admit Date: 12/5/2022    Plan: Home with wife vs STR   Discharge Plan     Row Name 12/11/22 1434       Plan    Plan Home with wife vs STR    Plan Comments Per Chart review patient remains confused and was in restraints this morning. Per MD in huddle will order sitter today. He remains on tube feedings at this time. Initial plan was home with wife now hospital course to determine discharge disposition. CM will follow.               Discharge Codes    No documentation.                     Nat Alonzo RN

## 2022-12-11 NOTE — CONSULTS
Adult Nutrition  Assessment/PES    Patient Name:  Lionel Whitehead  YOB: 1945  MRN: [id number]  Admit Date:  12/5/2022    Assessment Date:  12/11/2022    Comments:  Current tube feeding (Fibersource HN at 60 ml/hour) provides: 1,728 kcals, 78 grams protein, 1,612 mg Na, 1,166 ml free water in TF plus 4,800 with flushes = total of 5,966 ml.  Recommend:  Increase tube feeding rate by 10 ml/hour every 8 hours as tolerated to a goal rate of 80 ml/hour.  This provides 2,304 kcals with 104 grams protein, 2,250 mg Na, 1,555 ml free water plus 4,800 ml water flushes=6,355 ml free water.     Reason for Assessment     Row Name 12/11/22 1458          Reason for Assessment    Reason For Assessment physician consult     Diagnosis --  hepatic encephalopathy, variceal bleed, status post EGD with banding 12/6/22, shock liver, alcoholic cirrhosis                Nutrition/Diet History     Row Name 12/11/22 1507          Nutrition/Diet History    Typical Intake (Food/Fluid/EN/PN) pt remains non-verbal more responsive today per nursing and wife, normally a good eater per wife but had been cutting back on how many sweets he was eating to help with his weight                Labs/Tests/Procedures/Meds     Row Name 12/11/22 1509          Labs/Procedures/Meds    Lab Results Reviewed reviewed     Lab Results Comments Na 156 (was 157), BUN 45 (down from as high as 68), NH3 126 (down from 174)        Diagnostic Tests/Procedures    Diagnostic Test/Procedure Reviewed reviewed     Diagnostic Test/Procedures Comments KUB        Medications    Pertinent Medications Comments lactulose, octreotide, IV fluids at 125 ml/hour                Physical Findings     Row Name 12/11/22 1607          Physical Findings    Overall Physical Appearance no edema noted     Enteral Access Devices nasogastric tube TF with Fibersource HN running at 60 ml/hour with flushes of 400 ml water every 2 hours               Estimated/Assessed Needs -  "Anthropometrics     Row Name 12/11/22 1609          Anthropometrics    Age for Calculations 75     Height for Calculation 1.854 m (6' 0.99\")     Weight for Calculation 103 kg (227 lb 1.2 oz)        Estimated/Assessed Needs    Additional Documentation [place]-St. Jeor Equation (Group)        [place]-St. Jeor Equation    RMR ([place]-St. Jeor Equation) 1818.875     [place]-St. Jeor Activity Factors 1.2     Activity Factors ([place]-St. Jeor) 2182.65        Protein Requirements    Est Protein Requirement Amount (gms/kg) 0.8 gm protein  -1.0= grams/day        Fluid Requirements    Estimated Fluid Requirement Method other (see comments)  30-35 ml/kg=3,090-3,605                Nutrition Prescription Ordered     Row Name 12/11/22 1623          Nutrition Prescription PO    Current PO Diet NPO                     Problem/Interventions:   Problem 1     Row Name 12/11/22 1623          Nutrition Diagnoses Problem 1    Problem 1 Altered GI Function     Etiology (related to) Medical Diagnosis;Factors Affecting Nutrition     Signs/Symptoms (evidenced by) NPO                      Intervention Goal     Row Name 12/11/22 1624          Intervention Goal    TF/PN Adjust TF/PN                Nutrition Intervention     Row Name 12/11/22 1624          Nutrition Intervention    RD/Tech Action Follow Tx progress                  Education/Evaluation     Row Name 12/11/22 1624          Education    Education Education not appropriate at this time        Monitor/Evaluation    Monitor I&O;Pertinent labs;TF delivery/tolerance;Weight;Skin status                 Electronically signed by:  Emelina Caal RD  12/11/22 16:25 EST  "

## 2022-12-11 NOTE — PROGRESS NOTES
GI Daily Progress Note    Assessment/Plan:      Upper GI bleed    Metabolic encephalopathy    Hyperammonemia (HCC)    Chronic liver failure without hepatic coma (HCC)       LOS: 5 days     Lionel Whitehead is a 77 y.o. male who was admitted with Upper GI bleed. He reports the symptoms are improving with treatment. Respnds more easily to voice but still nonverbal, Tolerating TFs and ha kept his NGT inplace, HGB and PLT stable as wel as hisLFTS are better, AFP noted yet US didn't reveal any discrete Mass.     Subjective:    Patient expresses Still non-verbal  Patient denies Still non-verbal    Objective:    Vital signs in last 24 hours:  Temp:  [97.4 °F (36.3 °C)-98.8 °F (37.1 °C)] 97.4 °F (36.3 °C)  Heart Rate:  [] 69  Resp:  [19-22] 20  BP: (120-179)/() 147/87    Intake/Output last 3 shifts:  I/O last 3 completed shifts:  In: 7096.7 [I.V.:3256.7; Other:2700; NG/GT:640; IV Piggyback:500]  Out: 5580 [Urine:2330; Stool:3250]  Intake/Output this shift:  I/O this shift:  In: 400 [Other:400]  Out: -     Physical Exam:Abdomen  Sounds Normal Active Bowel Sounds   Distension Soft   Tenderness Nontender     Imaging Results (Last 72 Hours)     Procedure Component Value Units Date/Time    XR Shoulder 2+ View Right [[id number]] Collected: 12/10/22 1711     Updated: 12/10/22 1713    Narrative:      CR Shoulder Comp Min 2 Vws RT    INDICATION:   Right shoulder pain status post fall 5 days ago.    COMPARISON:   None available.    FINDINGS:   AP internal rotation and AP external rotation views of the right shoulder.   No fracture or dislocation.  Mild degenerative change of the acromioclavicular joint. Humeral head is slightly high riding which may represent a rotator cuff tear.  No foreign  body.      Impression:        1. No acute fracture or dislocation.  2. Slightly high riding humeral head raising concern for possible rotator cuff tear. This can be further evaluated with a nonemergent noncontrast MRI of the right  shoulder on an outpatient basis.  3. Mild AC joint arthrosis.    Signer Name: Jersey Cabrera MD   Signed: 12/10/2022 5:11 PM   Workstation Name: [id number]    [facility]    US Abdomen Complete [[id number]] Collected: 12/09/22 1711     Updated: 12/09/22 1713    Narrative:      US Abdomen Complete    INDICATION:   Abnormal findings on plain film, elevated transaminases;Gastrointestinal hemorrhage, unspecified    COMPARISON:   None available.    FINDINGS:    Technically difficult exam due to patient condition and patient body habitus per technologist report.    PANCREAS: The pancreas is obscured due to overlying bowel gas.    LIVER: Enlarged liver measuring 20.5 cm with coarse echotexture and nodularity.     COMMON BILE DUCT: The common duct is normal in size at the zafar hepatis measuring 5 mm.    GALLBLADDER: Small layering gallstones with gallbladder wall thickening measuring up to 5.6 mm.    KIDNEYS: The right kidney measures 11.5 cm. The left kidney measures 11.8 cm. No renal calculi are identified although evaluation is limited.    SPLEEN: The spleen is enlarged measuring 16.5 cm.      OTHER: Infrarenal abdominal aorta measures 3.4 cm in maximal dimension. IVC is not visualized. Small volume ascites..      Impression:        1.  Technically difficult exam due to patient condition and patient body habitus per technologist report.  2.  Enlarged liver with coarse echotexture and nodularity likely reflecting underlying liver disease.  3.  Splenomegaly with small volume ascites.  4.  Cholelithiasis with gallbladder wall thickening. Unable to assess for sonographic Graves's due to patient sedation.  5.  No evidence of hydronephrosis. No definite renal calculi although evaluation is limited.  6.  Infrarenal abdominal aortic aneurysm measuring up to 3.4 cm.    Signer Name: Paco Mckeon MD   Signed: 12/9/2022 5:11 PM   Workstation Name: [id number]    [facility]    MRI Brain  Without Contrast [[id number]] Collected: 12/09/22 1346     Updated: 12/09/22 1349    Narrative:      MRI Brain WO    INDICATION:   Mental status change of uncertain cause. GI hemorrhage. Rule out stroke. Hepatic encephalopathy.    TECHNIQUE:   MRI of the brain without IV contrast.    COMPARISON:    Head CT 12/7/2022.    FINDINGS:  Study is motion degraded despite use of motion limiting sequences. There is no evidence for a recent infarct. There is mild generalized atrophy and mild white matter signal abnormality. There is no Chiari I malformation. There is no extra-axial fluid  collection. There is no gross evidence of abnormal mineral deposition in the basal ganglia (imaging evidence for hepatic encephalopathy). This certainly does not exclude that diagnosis particularly given the limitations of the current study. There is no  MRI evidence for intracranial hemorrhage. The major arterial intracranial flow voids are grossly maintained at the base the brain. There is a small amount of fluid or inflammatory change in the right greater left side mastoid air cells and there is mild  paranasal sinus mucosal thickening.      Impression:        1. Study severely motion degraded. Allowing for this, there is no evidence for acute infarct. There is generalized atrophy and mild probable sequelae of small vessel disease.    Signer Name: Libby Tyler MD   Signed: 12/9/2022 1:46 PM   Workstation Name: [id number]    [facility] [place]    XR Abdomen KUB [[id number]] Collected: 12/08/22 1418     Updated: 12/08/22 1423    Narrative:      XR ABDOMEN KUB-: 12/8/2022 1:53 PM     INDICATION:   NG tube placement     COMPARISON:   None available.     FINDINGS:  AP radiographs of the abdomen. There is an enteric tube present and the  tip is at the level of the mid body stomach. There are calcific  densities in the right midabdomen, one of which appears to represent a  renal or renal pelvic stone measuring 9 mm. Other smaller  calcific  densities in the right upper quadrant are nonspecific but suspicious for  cholelithiasis. No suspicious calcifications in the pelvis. There is a  Yancey catheter present..     The bowel gas pattern is nonobstructive. No acute osseous abnormalities.  No radiopaque foreign body. Dextroscoliosis and secondary degenerative  change in the lumbar spine.       Impression:      1. Enteric tube tip at the level of the mid body stomach.  2. Probable right renal/pelvic stone and additional findings suspicious  for cholelithiasis.     This report was finalized on 12/8/2022 2:21 PM by Dr. Dion Goddard MD.             WBC   Date Value Ref Range Status   12/11/2022 7.81 3.40 - 10.80 10*3/mm3 Final     RBC   Date Value Ref Range Status   12/11/2022 2.73 (L) 4.14 - 5.80 10*6/mm3 Final     Hemoglobin   Date Value Ref Range Status   12/11/2022 8.5 (L) 13.0 - 17.7 g/dL Final     Hematocrit   Date Value Ref Range Status   12/11/2022 27.9 (L) 37.5 - 51.0 % Final     MCV   Date Value Ref Range Status   12/11/2022 102.2 (H) 79.0 - 97.0 fL Final     MCH   Date Value Ref Range Status   12/11/2022 31.1 26.6 - 33.0 pg Final     MCHC   Date Value Ref Range Status   12/11/2022 30.5 (L) 31.5 - 35.7 g/dL Final     RDW   Date Value Ref Range Status   12/11/2022 17.9 (H) 12.3 - 15.4 % Final     RDW-SD   Date Value Ref Range Status   12/11/2022 66.1 (H) 37.0 - 54.0 fl Final     MPV   Date Value Ref Range Status   12/11/2022 12.8 (H) 6.0 - 12.0 fL Final     Platelets   Date Value Ref Range Status   12/11/2022 60 (L) 140 - 450 10*3/mm3 Final     Neutrophil %   Date Value Ref Range Status   12/11/2022 81.9 (H) 42.7 - 76.0 % Final     Lymphocyte %   Date Value Ref Range Status   12/11/2022 6.7 (L) 19.6 - 45.3 % Final     Monocyte %   Date Value Ref Range Status   12/11/2022 10.0 5.0 - 12.0 % Final     Eosinophil %   Date Value Ref Range Status   12/11/2022 0.0 (L) 0.3 - 6.2 % Final     Basophil %   Date Value Ref Range Status   12/11/2022 0.1  0.0 - 1.5 % Final     Immature Grans %   Date Value Ref Range Status   12/11/2022 1.3 (H) 0.0 - 0.5 % Final     Neutrophils, Absolute   Date Value Ref Range Status   12/11/2022 6.40 1.70 - 7.00 10*3/mm3 Final     Lymphocytes, Absolute   Date Value Ref Range Status   12/11/2022 0.52 (L) 0.70 - 3.10 10*3/mm3 Final     Monocytes, Absolute   Date Value Ref Range Status   12/11/2022 0.78 0.10 - 0.90 10*3/mm3 Final     Eosinophils, Absolute   Date Value Ref Range Status   12/11/2022 0.00 0.00 - 0.40 10*3/mm3 Final     Basophils, Absolute   Date Value Ref Range Status   12/11/2022 0.01 0.00 - 0.20 10*3/mm3 Final     Immature Grans, Absolute   Date Value Ref Range Status   12/11/2022 0.10 (H) 0.00 - 0.05 10*3/mm3 Final       Glucose   Date Value Ref Range Status   12/11/2022 149 (H) 65 - 99 mg/dL Final     Sodium   Date Value Ref Range Status   12/11/2022 156 (H) 136 - 145 mmol/L Final     Potassium   Date Value Ref Range Status   12/11/2022 4.1 3.5 - 5.2 mmol/L Final     CO2   Date Value Ref Range Status   12/11/2022 19.1 (L) 22.0 - 29.0 mmol/L Final     Chloride   Date Value Ref Range Status   12/11/2022 127 (H) 98 - 107 mmol/L Final     Anion Gap   Date Value Ref Range Status   12/11/2022 9.9 5.0 - 15.0 mmol/L Final     Creatinine   Date Value Ref Range Status   12/11/2022 1.09 0.76 - 1.27 mg/dL Final     BUN   Date Value Ref Range Status   12/11/2022 45 (H) 8 - 23 mg/dL Final     BUN/Creatinine Ratio   Date Value Ref Range Status   12/11/2022 41.3 (H) 7.0 - 25.0 Final     Calcium   Date Value Ref Range Status   12/11/2022 7.7 (L) 8.6 - 10.5 mg/dL Final     Alkaline Phosphatase   Date Value Ref Range Status   12/11/2022 275 (H) 39 - 117 U/L Final     Total Protein   Date Value Ref Range Status   12/11/2022 4.9 (L) 6.0 - 8.5 g/dL Final     ALT (SGPT)   Date Value Ref Range Status   12/11/2022 943 (H) 1 - 41 U/L Final     AST (SGOT)   Date Value Ref Range Status   12/11/2022 52 (H) 1 - 40 U/L Final     Total Bilirubin    Date Value Ref Range Status   12/11/2022 1.5 (H) 0.0 - 1.2 mg/dL Final     Albumin   Date Value Ref Range Status   12/11/2022 2.70 (L) 3.50 - 5.20 g/dL Final     Globulin   Date Value Ref Range Status   12/11/2022 2.2 gm/dL Final     HE  Variceal Bleed S/P EGD w banding 12/6  Shock liver  Alcoholic Cirrhosis  Thrombocytopenia  Elevated AFP    Subjectively more responsive so will continue present care, waiting on his MSE to be able to perform a high quality three phase CT for his liver due to elevated AFP.

## 2022-12-11 NOTE — PLAN OF CARE
Goal Outcome Evaluation:              Outcome Evaluation: VS stable, pt agitated overnight, nods yes when asked if his legs are bothering him, pt has hist of restless legs, 200 mg Gabapentin given x 1. Pt received multiple doses of labetolol for B/P greater than 160, Hr improved in 70's overnight. stool has lightened in color, F/C output adequate. Pt tolerating tube feeds, increasing rate by 10 Q6h currently running at 40 ml/hr with 400 ml tap water flush Q2H. PIV x 2 placed overnight, waiting for PICC placement

## 2022-12-12 NOTE — CASE MANAGEMENT/SOCIAL WORK
Continued Stay Note  BOUBACAR Dawson     Patient Name: Lionel Whitehead  MRN: [id number]  Today's Date: 12/12/2022    Admit Date: 12/5/2022    Plan: from home w wife. Possible STR at dc   Discharge Plan     Row Name 12/12/22 1315       Plan    Plan from home w wife. Possible STR at dc    Plan Comments Follow up visit Ramakrishna ROMAN at bedside and states pateint is following commands a little better today. No family present. Noted HH agency list and STR facility list on bedside table. CM will continue to follow to assist with dc needs.               Discharge Codes    No documentation.                     Matt Lee RN

## 2022-12-12 NOTE — SIGNIFICANT NOTE
12/12/22 1150   Rehab Evaluation   Document Type other (see comments)   Patient/Family/Caregiver Comments/Observations Pt was seen at bedside for attempted swallow eval with wife present. Mental status fluctuates Able to follow some simple, 1-step commands, but not consistently. Mouth is very dry and dried secretions noted in oral cavity on tongue and palate, however, pt somewhat resistant with more aggressive oral care . Able to tolerate swab with slight moisture. Startles at times in response to readmission of swab. Difficulty getting pt to attempt to swallow but eventually does after significant delay. Pt demonstrating confusion and telling his wife he is ready to go home. Pt continues to not be ready to fully participate in swallow evaluation or for safe po intake at this time. Will continue to follow and evaluate as able. Wife and RN, Ramakrishna, understanding. Recommend to continue NPO status.

## 2022-12-12 NOTE — PROGRESS NOTES
"SERVICE: [facility] HOSPITALIST    CONSULTANTS: GI, Neurology    CHIEF COMPLAINT: f/u AMS, anemia, GI bleed, liver failure, cirrhosis    SUBJECTIVE: Patient still significantly encephalopathic, however, seems to slowly be improving.  He will now answer some yes or no questions and gesture toward things such as his NG tube.  ROS unobtainable    OBJECTIVE:    Physical exam is largely unchanged from previous exam, except where documented below, examination is accurate as of 12/11/2022    Physical Exam:  General: Patient is a 77-year-old chronically ill-appearing male with an NG tube and a nasal trumpet  HENT: Head is atraumatic, normocephalic.. Nose is without obvious congestion and appears patent. Neck is supple and trachea is midline.   Eyes:  Pupils appear equal and round.   Cardiovascular: Heart has regular rate and rhythm although tachycardic with no murmurs, rubs or gallops noted.   Respiratory: Lungs are clear to ausculation without wheezes, rhonchi or rales.   Abdominal/GI: Soft, nontender, bowel sounds present. No rebound or guarding present.   Extremities: No peripheral edema noted.   Musculoskeletal: Spontaneous movement of bilateral upper and lower extremities against gravity noted. No signs of injury or deformity noted.   Skin: Warm and dry.   Psych: Anxious appearing.   Neuro: Patient will intermittently nod his head to yes or no questions but will not hold a conversation or follow commands.  No facial asymmetry noted.  He moves all 4 limbs against gravity without difficulty.  Pupils are equal and reactive    /86 (BP Location: Right arm, Patient Position: Lying)   Pulse 78   Temp 98.1 °F (36.7 °C)   Resp 20   Ht 185.4 cm (73\")   Wt 103 kg (227 lb 11.8 oz)   SpO2 100%   BMI 30.05 kg/m²     MEDS/LABS REVIEWED AND ORDERED    lactulose, 20 g, Nasogastric, Q6H  piperacillin-tazobactam, 4.5 g, Intravenous, Q8H  sodium chloride, 10 mL, Intravenous, Q12H  sodium chloride, 10 mL, " Intravenous, Q12H  sodium chloride, 10 mL, Intravenous, Q12H          LAB/DIAGNOSTICS:    Lab Results (last 24 hours)     Procedure Component Value Units Date/Time    Basic Metabolic Panel [[id number]] Collected: 12/11/22 2153    Specimen: Blood Updated: 12/11/22 2158    POC Glucose Once [[id number]]  (Abnormal) Collected: 12/11/22 1842    Specimen: Blood Updated: 12/11/22 1848     Glucose 168 mg/dL      Comment: Meter: [id number] : [id number] Bishop Nataliia ROMAN       POC Glucose Once [[id number]]  (Abnormal) Collected: 12/11/22 1252    Specimen: Blood Updated: 12/11/22 1258     Glucose 190 mg/dL      Comment: Meter: [id number] : [id number] Bishop Nataliia ROMAN       Comprehensive Metabolic Panel [[id number]]  (Abnormal) Collected: 12/11/22 0540    Specimen: Blood Updated: 12/11/22 0637     Glucose 149 mg/dL      BUN 45 mg/dL      Creatinine 1.09 mg/dL      Sodium 156 mmol/L      Potassium 4.1 mmol/L      Chloride 127 mmol/L      CO2 19.1 mmol/L      Calcium 7.7 mg/dL      Total Protein 4.9 g/dL      Albumin 2.70 g/dL      ALT (SGPT) 943 U/L      AST (SGOT) 52 U/L      Alkaline Phosphatase 275 U/L      Total Bilirubin 1.5 mg/dL      Globulin 2.2 gm/dL      A/G Ratio 1.2 g/dL      BUN/Creatinine Ratio 41.3     Anion Gap 9.9 mmol/L      eGFR 70.8 mL/min/1.73      Comment: National Kidney Foundation and American Society of Nephrology (ASN) Task Force recommended calculation based on the Chronic Kidney Disease Epidemiology Collaboration (CKD-EPI) equation refit without adjustment for race.       Narrative:      GFR Normal >60  Chronic Kidney Disease <60  Kidney Failure <15    The GFR formula is only valid for adults with stable renal function between ages 18 and 70.    CBC & Differential [[id number]]  (Abnormal) Collected: 12/11/22 0540    Specimen: Blood Updated: 12/11/22 0607    Narrative:      The following orders were created for panel order CBC & Differential.  Procedure                               Abnormality          Status                     ---------                               -----------         ------                     CBC Auto Differential[[id number]]        Abnormal            Final result                 Please view results for these tests on the individual orders.    CBC Auto Differential [[id number]]  (Abnormal) Collected: 12/11/22 0540    Specimen: Blood Updated: 12/11/22 0607     WBC 7.81 10*3/mm3      RBC 2.73 10*6/mm3      Hemoglobin 8.5 g/dL      Hematocrit 27.9 %      .2 fL      MCH 31.1 pg      MCHC 30.5 g/dL      RDW 17.9 %      RDW-SD 66.1 fl      MPV 12.8 fL      Platelets 60 10*3/mm3      Neutrophil % 81.9 %      Lymphocyte % 6.7 %      Monocyte % 10.0 %      Eosinophil % 0.0 %      Basophil % 0.1 %      Immature Grans % 1.3 %      Neutrophils, Absolute 6.40 10*3/mm3      Lymphocytes, Absolute 0.52 10*3/mm3      Monocytes, Absolute 0.78 10*3/mm3      Eosinophils, Absolute 0.00 10*3/mm3      Basophils, Absolute 0.01 10*3/mm3      Immature Grans, Absolute 0.10 10*3/mm3     POC Glucose Once [[id number]]  (Abnormal) Collected: 12/11/22 0540    Specimen: Blood Updated: 12/11/22 0547     Glucose 155 mg/dL      Comment: Meter: [id number] : [id number] Jan Wilkes RN       Blood Culture - Blood, Hand, Right [[id number]]  (Normal) Collected: 12/08/22 0205    Specimen: Blood from Hand, Right Updated: 12/11/22 0215     Blood Culture No growth at 3 days    Narrative:      Less than seven (7) mL's of blood was collected.  Insufficient quantity may yield false negative results.    Blood Culture - Blood, Hand, Right [[id number]]  (Normal) Collected: 12/08/22 0205    Specimen: Blood from Hand, Right Updated: 12/11/22 0215     Blood Culture No growth at 3 days    Narrative:      Less than seven (7) mL's of blood was collected.  Insufficient quantity may yield false negative results.    Basic Metabolic Panel [[id number]]  (Abnormal) Collected: 12/11/22 0031    Specimen: Blood Updated: 12/11/22 0142     Glucose  157 mg/dL      BUN 60 mg/dL      Creatinine 1.25 mg/dL      Sodium 157 mmol/L      Potassium 4.6 mmol/L      Comment: Slight hemolysis detected by analyzer. Results may be affected.        Chloride 127 mmol/L      CO2 19.1 mmol/L      Calcium 8.1 mg/dL      BUN/Creatinine Ratio 48.0     Anion Gap 10.9 mmol/L      eGFR 60.0 mL/min/1.73      Comment: National Kidney Foundation and American Society of Nephrology (ASN) Task Force recommended calculation based on the Chronic Kidney Disease Epidemiology Collaboration (CKD-EPI) equation refit without adjustment for race.       Narrative:      GFR Normal >60  Chronic Kidney Disease <60  Kidney Failure <15    The GFR formula is only valid for adults with stable renal function between ages 18 and 70.    POC Glucose Once [[id number]]  (Abnormal) Collected: 12/10/22 2354    Specimen: Blood Updated: 12/11/22 0001     Glucose 156 mg/dL      Comment: Meter: [id number] : [id number] Jan Wilkes RN           ECG 12 Lead Rhythm Change   Final Result   HEART RATE= 118  bpm   RR Interval= 508  ms   MO Interval= 151  ms   P Horizontal Axis= 50  deg   P Front Axis= 35  deg   QRSD Interval= 93  ms   QT Interval= 305  ms   QRS Axis= -21  deg   T Wave Axis= 17  deg   - ABNORMAL ECG -   Sinus tachycardia   Ventricular premature complex   Borderline left axis deviation   Non-specific STT wave change   Inferior infarct, old   Fusion complex   No change from prior tracing   Electronically Signed By: Nicola Hernández ([facility]) 10-Dec-2022 22:37:28   Date and Time of Study: 2022-12-10 08:17:42      ECG 12 Lead Rhythm Change   Final Result   HEART RATE= 103  bpm   RR Interval= 600  ms   MO Interval= 149  ms   P Horizontal Axis= 13  deg   P Front Axis= 25  deg   QRSD Interval= 76  ms   QT Interval= 354  ms   QRS Axis= -24  deg   T Wave Axis= 51  deg   - ABNORMAL ECG -   Sinus tachycardia   Paired ventricular premature complexes   Inferior infarct, old   now pvcs and pacs   Electronically Signed By:  Deirdre Rg ([facility]) 09-Dec-2022 12:01:05   Date and Time of Study: 2022-12-08 23:59:26      ECG 12 Lead Altered Mental Status   Final Result   HEART RATE= 96  bpm   RR Interval= 620  ms   PA Interval= 155  ms   P Horizontal Axis= 20  deg   P Front Axis= 28  deg   QRSD Interval= 85  ms   QT Interval= 351  ms   QRS Axis= -11  deg   T Wave Axis= 13  deg   - ABNORMAL ECG -   Sinus rhythm   Inferior infarct, old   NO PRIOR TRACING AVAILABLE FOR COMPARISON   Electronically Signed By: Eugene Polo ([facility]) 06-Dec-2022 07:38:49   Date and Time of Study: 2022-12-06 00:34:09          XR Shoulder 2+ View Right    Result Date: 12/10/2022  1. No acute fracture or dislocation. 2. Slightly high riding humeral head raising concern for possible rotator cuff tear. This can be further evaluated with a nonemergent noncontrast MRI of the right shoulder on an outpatient basis. 3. Mild AC joint arthrosis. Signer Name: Jersey Cabrera MD  Signed: 12/10/2022 5:11 PM  Workstation Name: [email or web address]  [facility]        ASSESSMENT/PLAN:  Please not portions of this assessment/plan may have been copied and pasted, but I have personally seen this patient and reviewed each line of this assessment and plan for accuracy and made updates to reflect my necessary changes on 12/11/2022     Hepatic encephalopathy/elevated transaminases/liver cirrhosis/esophageal varices s/p banding  -Patient still receiving lactulose and is responding well- too well.  He now has a large free water deficit/hypernatremia  -Will get ammonia level in the a.m.  This will only be useful if it is negative.  - Per nursing patient has had over a liter of stool out in the last 24 hours.  Will reduce frequency of lactulose   -Yesterday started tube feeds with frequent large volume free water flushes.  Prior to this had attempted D5W in order to correct hypernatremia but believe that this is all related to his diarrhea   -As hypernatremia had not  improved with prior treatment had planned to increase free water flushes further in addition to IV fluids and reducing lactulose.  -However, patient pulled out NG tube today.  Considering he has varices will not be replacing this without GI.  Because of this patient will likely need even more IV fluids  - Considering patient's mental status has been slowly improving each day, hopeful that this will continue and that he soon can start drinking on his own in order to replace his water  - However, hyponatremia can cause altered mental status so definitely want this to be resolved as well  -Have signed out to night physician to please follow-up on BMP this evening and to alter treatment if sodium is not improving  -GI continues to follow. MRI brain negative for stroke. Avoid sedating medications.   -Neurology assisting as well.     Hypernatremia/free water deficit  Likely secondary to the copious amounts of diarrhea from lactulose administration  We will check magnesium in a.m. considering all of the diarrhea  See above    Suspected aspiration pneumonia  -procalcitionin elevated, with encephalopathy unable to fully assess for aspiration,   - Patient overall improving, continue Zosyn for now  -Patient no longer requiring supplemental O2  -continue aspiration precautions, speech following  -Patient was getting tube feeds but pulled out NG tube.  Hopefully patient's mental status will improve further and he can start working with speech therapy    Esophageal and gastric variceal bleeding resulting in acute blood loss anemia  -banding performed with EGD on 12/6/22  -continue protonix and octreotide drips per GI  -no additional signs of bleeding, HGB stable, will continue to monitor H&H.     JOELLE  Resolved, will continue to monitor renal function and electrolytes as well as urine output    RLS-have intermittently been giving low-dose gabapentin when patient seems to be suffering.      PLAN FOR DISPOSITION: RUPESH Chase  "DO Deandre  Hospitalist, [facility]  12/11/22  02:36 EST    At [facility], we believe that sharing information builds trust and better relationships. You are receiving this note because you recently visited [facility]. It is possible you will see health information before a provider has talked with you about it. This kind of information can be easy to misunderstand. To help you fully understand what it means for your health, we urge you to discuss this note with your provider.    \"Dictated utilizing Dragon dictation\"    "

## 2022-12-12 NOTE — PLAN OF CARE
Goal Outcome Evaluation:              Outcome Evaluation: Pt more alert, attempting to speak, awake overnight, restless, following occasional commands. NG replaced and tube feeds/flushes resumed. VSS, labs improved. F/C output adequate, sitter at bedside to remind pt to not pull lines/tubes.

## 2022-12-12 NOTE — PROGRESS NOTES
"SERVICE: [facility] HOSPITALIST    CONSULTANTS: GI    CHIEF COMPLAINT: follow up encephalopathy    SUBJECTIVE: Patient seen and examined at bedside. Patient remains somnolent.  Noticeably easier to arouse with verbal and physical stimuli than in previous examinations a patient.  He still does not respond to my questions and therefore ROS not obtained.  Nursing staff also notes he is easier to arouse and has not verbalized anything to them as of yet.     OBJECTIVE:    Physical exam is largely unchanged from previous exam, except where documented below, examination is accurate as of 12/12/2022    Physical Exam:  General: Patient is somnolent but easily arousable.  Appears comfortable, no acute distress noted.   HENT: Head is atraumatic, normocephalic. Hearing is grossly intact. Nose is without obvious congestion and appears patent. Neck is supple and trachea is midline.   Eyes: Vision is grossly intact. Pupils appear equal and round.   Cardiovascular: Heart has regular rate and rhythm with no murmurs, rubs or gallops noted.   Respiratory: Lungs are clear to ausculation without wheezes, rhonchi or rales.   Abdominal/GI: Soft, nontender, bowel sounds present. No rebound or guarding present.   Extremities: No peripheral edema noted.   Musculoskeletal: Spontaneous movement of bilateral upper and lower extremities against gravity noted. No signs of injury or deformity noted.   Skin: Warm and dry.   Psych: Does not respond to questions therefore not able to fully assess. Cooperative with exam.   Neuro: No facial asymmetry noted. No focal deficits noted, hearing and vision are grossly intact.     /77 (BP Location: Right arm, Patient Position: Lying)   Pulse 76   Temp 98.1 °F (36.7 °C) (Temporal)   Resp 22   Ht 185.4 cm (73\")   Wt 103 kg (227 lb 11.8 oz)   SpO2 95%   BMI 30.05 kg/m²     MEDS/LABS REVIEWED AND ORDERED    lactulose, 20 g, Nasogastric, Q6H  piperacillin-tazobactam, 4.5 g, " Intravenous, Q8H  sodium chloride, 10 mL, Intravenous, Q12H  sodium chloride, 10 mL, Intravenous, Q12H  sodium chloride, 10 mL, Intravenous, Q12H          LAB/DIAGNOSTICS:    Lab Results (last 24 hours)     Procedure Component Value Units Date/Time    Ammonia [[id number]]  (Abnormal) Collected: 12/12/22 0513    Specimen: Blood Updated: 12/12/22 0545     Ammonia 15 umol/L     Comprehensive Metabolic Panel [[id number]]  (Abnormal) Collected: 12/12/22 0513    Specimen: Blood Updated: 12/12/22 0541     Glucose 174 mg/dL      BUN 47 mg/dL      Creatinine 1.12 mg/dL      Sodium 154 mmol/L      Potassium 3.8 mmol/L      Chloride 123 mmol/L      CO2 21.5 mmol/L      Calcium 7.9 mg/dL      Total Protein 5.0 g/dL      Albumin 2.60 g/dL      ALT (SGPT) 592 U/L      AST (SGOT) 31 U/L      Alkaline Phosphatase 267 U/L      Total Bilirubin 1.7 mg/dL      Globulin 2.4 gm/dL      A/G Ratio 1.1 g/dL      BUN/Creatinine Ratio 42.0     Anion Gap 9.5 mmol/L      eGFR 68.5 mL/min/1.73      Comment: National Kidney Foundation and American Society of Nephrology (ASN) Task Force recommended calculation based on the Chronic Kidney Disease Epidemiology Collaboration (CKD-EPI) equation refit without adjustment for race.       Narrative:      GFR Normal >60  Chronic Kidney Disease <60  Kidney Failure <15    The GFR formula is only valid for adults with stable renal function between ages 18 and 70.    Magnesium [[id number]]  (Normal) Collected: 12/12/22 0513    Specimen: Blood Updated: 12/12/22 0541     Magnesium 2.1 mg/dL     CBC & Differential [[id number]]  (Abnormal) Collected: 12/12/22 0513    Specimen: Blood Updated: 12/12/22 0520    Narrative:      The following orders were created for panel order CBC & Differential.  Procedure                               Abnormality         Status                     ---------                               -----------         ------                     CBC Auto Differential[[id number]]        Abnormal             Final result                 Please view results for these tests on the individual orders.    CBC Auto Differential [[id number]]  (Abnormal) Collected: 12/12/22 0513    Specimen: Blood Updated: 12/12/22 0520     WBC 7.52 10*3/mm3      RBC 2.64 10*6/mm3      Hemoglobin 8.2 g/dL      Hematocrit 27.2 %      .0 fL      MCH 31.1 pg      MCHC 30.1 g/dL      RDW 17.9 %      RDW-SD 66.4 fl      MPV 12.6 fL      Platelets 59 10*3/mm3      Neutrophil % 83.3 %      Lymphocyte % 6.4 %      Monocyte % 8.6 %      Eosinophil % 0.3 %      Basophil % 0.1 %      Immature Grans % 1.3 %      Neutrophils, Absolute 6.26 10*3/mm3      Lymphocytes, Absolute 0.48 10*3/mm3      Monocytes, Absolute 0.65 10*3/mm3      Eosinophils, Absolute 0.02 10*3/mm3      Basophils, Absolute 0.01 10*3/mm3      Immature Grans, Absolute 0.10 10*3/mm3      nRBC 0.3 /100 WBC     Blood Culture - Blood, Hand, Right [[id number]]  (Normal) Collected: 12/08/22 0205    Specimen: Blood from Hand, Right Updated: 12/12/22 0215     Blood Culture No growth at 4 days    Narrative:      Less than seven (7) mL's of blood was collected.  Insufficient quantity may yield false negative results.    Blood Culture - Blood, Hand, Right [[id number]]  (Normal) Collected: 12/08/22 0205    Specimen: Blood from Hand, Right Updated: 12/12/22 0215     Blood Culture No growth at 4 days    Narrative:      Less than seven (7) mL's of blood was collected.  Insufficient quantity may yield false negative results.    POC Glucose Once [[id number]]  (Abnormal) Collected: 12/11/22 2345    Specimen: Blood Updated: 12/11/22 2351     Glucose 164 mg/dL      Comment: Meter: [id number] : [id number] Sneha Jacksonie TATYANA       Basic Metabolic Panel [[id number]]  (Abnormal) Collected: 12/11/22 2153    Specimen: Blood Updated: 12/11/22 2300     Glucose 144 mg/dL      BUN 53 mg/dL      Creatinine 1.21 mg/dL      Sodium 156 mmol/L      Potassium 4.0 mmol/L      Chloride 123 mmol/L      CO2 21.6 mmol/L       Calcium 8.3 mg/dL      BUN/Creatinine Ratio 43.8     Anion Gap 11.4 mmol/L      eGFR 62.4 mL/min/1.73      Comment: National Kidney Foundation and American Society of Nephrology (ASN) Task Force recommended calculation based on the Chronic Kidney Disease Epidemiology Collaboration (CKD-EPI) equation refit without adjustment for race.       Narrative:      GFR Normal >60  Chronic Kidney Disease <60  Kidney Failure <15    The GFR formula is only valid for adults with stable renal function between ages 18 and 70.    POC Glucose Once [[id number]]  (Abnormal) Collected: 12/11/22 1842    Specimen: Blood Updated: 12/11/22 1848     Glucose 168 mg/dL      Comment: Meter: [id number] : [id number] Bishop Nataliia ROMAN       POC Glucose Once [[id number]]  (Abnormal) Collected: 12/11/22 1252    Specimen: Blood Updated: 12/11/22 1258     Glucose 190 mg/dL      Comment: Meter: [id number] : [id number] Bishop Nataliia ROMAN           ECG 12 Lead Rhythm Change   Final Result   HEART RATE= 118  bpm   RR Interval= 508  ms   SC Interval= 151  ms   P Horizontal Axis= 50  deg   P Front Axis= 35  deg   QRSD Interval= 93  ms   QT Interval= 305  ms   QRS Axis= -21  deg   T Wave Axis= 17  deg   - ABNORMAL ECG -   Sinus tachycardia   Ventricular premature complex   Borderline left axis deviation   Non-specific STT wave change   Inferior infarct, old   Fusion complex   No change from prior tracing   Electronically Signed By: Nicola Hernández ([facility]) 10-Dec-2022 22:37:28   Date and Time of Study: 2022-12-10 08:17:42      ECG 12 Lead Rhythm Change   Final Result   HEART RATE= 103  bpm   RR Interval= 600  ms   SC Interval= 149  ms   P Horizontal Axis= 13  deg   P Front Axis= 25  deg   QRSD Interval= 76  ms   QT Interval= 354  ms   QRS Axis= -24  deg   T Wave Axis= 51  deg   - ABNORMAL ECG -   Sinus tachycardia   Paired ventricular premature complexes   Inferior infarct, old   now pvcs and pacs   Electronically Signed By: Deirdre Rg ([facility])  09-Dec-2022 12:01:05   Date and Time of Study: 2022-12-08 23:59:26      ECG 12 Lead Altered Mental Status   Final Result   HEART RATE= 96  bpm   RR Interval= 620  ms   FL Interval= 155  ms   P Horizontal Axis= 20  deg   P Front Axis= 28  deg   QRSD Interval= 85  ms   QT Interval= 351  ms   QRS Axis= -11  deg   T Wave Axis= 13  deg   - ABNORMAL ECG -   Sinus rhythm   Inferior infarct, old   NO PRIOR TRACING AVAILABLE FOR COMPARISON   Electronically Signed By: Eugene Polo ([facility]) 06-Dec-2022 07:38:49   Date and Time of Study: 2022-12-06 00:34:09          XR Shoulder 2+ View Right    Result Date: 12/10/2022  1. No acute fracture or dislocation. 2. Slightly high riding humeral head raising concern for possible rotator cuff tear. This can be further evaluated with a nonemergent noncontrast MRI of the right shoulder on an outpatient basis. 3. Mild AC joint arthrosis. Signer Name: Jersey Cabrera MD  Signed: 12/10/2022 5:11 PM  Workstation Name: [email or web address]  [facility]    XR Abdomen KUB    Result Date: 12/11/2022  Feeding tube looped in the proximal stomach. Consider advancing the tube. Signer Name: Augie Juan MD  Signed: 12/11/2022 11:02 PM  Workstation Name: [email or web address]  [facility]        ASSESSMENT/PLAN:  Please not portions of this assessment/plan may have been copied and pasted, but I have personally seen this patient and reviewed each line of this assessment and plan for accuracy and made updates to reflect my necessary changes on 12/12/2022     Hepatic encephalopathy/liver cirrhosis with esophageal varices status post banding/elevated transaminases  -Patient with free water deficit, lactulose decreased in last 24 hours after patient having large stool volume  -Patient had removed NG tube this has been replaced, plan to adjust free water as needed  -Mental status slowly improving, GI planning three-phase CT of abdomen when able  -GI continues to follow  -MRI  "brain negative for stroke    Hypernatremia/free water deficit  -Likely secondary to copious amounts of stool with lactulose administration, as above decrease lactulose frequency  -Increase free water as needed with tube feeds    Suspected aspiration pneumonia  -Procalcitonin level elevated  -Patient has been improving, liberated from oxygen  -Continue aspiration precautions, speech following  -NG tube in place again.   -Continue Zosyn for now    Esophageal and gastric variceal bleeding resulting in acute blood loss anemia  -Banding performed with EGD on 12/6/2022  -GI continues to follow as above  -He remains on octreotide and Protonix drips  -Hemoglobin remained stable.    RLS-patient has intermittently been given low-dose gabapentin as needed.     PLAN FOR DISPOSITION: RUPESH Mota DO  [facility]  12/12/22  06:50 EST    At [facility], we believe that sharing information builds trust and better relationships. You are receiving this note because you recently visited [facility]. It is possible you will see health information before a provider has talked with you about it. This kind of information can be easy to misunderstand. To help you fully understand what it means for your health, we urge you to discuss this note with your provider.    \"Dictated utilizing Dragon dictation\"    "

## 2022-12-12 NOTE — PROGRESS NOTES
Adult Nutrition  Assessment/PES    Patient Name:  Lionel Whitehead  YOB: 1945  MRN: [id number]  Admit Date:  12/5/2022    Assessment Date:  12/12/2022    Comments:  Continue current TF as tolerated and free water, Na noted.  Will cont to follow and monitor.      Reason for Assessment     Row Name 12/12/22 1411          Reason for Assessment    Reason For Assessment follow-up protocol     Diagnosis neurologic conditions  hepatic enceph cirrhosis, hypernatremia, sepsis, PNA ? aspiration                Nutrition/Diet History     Row Name 12/12/22 1412          Nutrition/Diet History    Typical Intake (Food/Fluid/EN/PN) Pt opens eyes at visit, NGT and TF noted with Free water at bedside                Labs/Tests/Procedures/Meds     Row Name 12/12/22 1412          Labs/Procedures/Meds    Lab Results Reviewed reviewed     Lab Results Comments Nh3 15 , tbili 1.7 H, Na 154 H, glu 174 H, bun 47 H,  H        Diagnostic Tests/Procedures    Diagnostic Test/Procedure Reviewed reviewed        Medications    Pertinent Medications Reviewed reviewed     Pertinent Medications Comments lactulose                    Nutrition Prescription Ordered     Row Name 12/12/22 1413          Nutrition Prescription PO    Current PO Diet NPO        Nutrition Prescription EN    Product Fibersource HN (Jevity 1.2)     TF Delivery Method Continuous     Continuous TF Goal Rate (mL/hr) 80 mL/hr     Water flush (mL)  400 mL     Water Flush Frequency Every 2 hours                Evaluation of Received Nutrient/Fluid Intake     Row Name 12/12/22 1414          Fluid Intake Evaluation    Other Fluid (mL) --  insufficient data        EN Evaluation    Number of Days EN Intake Evaluated Insufficient Data                   Problem/Interventions:   Problem 1     Row Name 12/12/22 1414          Nutrition Diagnoses Problem 1    Problem 1 Altered GI Function     Etiology (related to) Medical Diagnosis     Signs/Symptoms (evidenced by) NPO                       Intervention Goal     Row Name 12/12/22 1415          Intervention Goal    TF/PN Adjust TF/PN                Nutrition Intervention     Row Name 12/12/22 1415          Nutrition Intervention    RD/Tech Action Follow Tx progress                  Education/Evaluation     Row Name 12/12/22 1415          Education    Education Education not appropriate at this time        Monitor/Evaluation    Monitor Per protocol;I&O;Pertinent labs;TF delivery/tolerance;Weight;Symptoms                 Electronically signed by:  Alejandra Chacon RD  12/12/22 14:15 EST

## 2022-12-12 NOTE — NURSING NOTE
Peripherally inserted central catheter attempted.  Basilic and brachial veins non compressible. Cephalic vein compressible but PICC was unable to be inserted to a central position past shoulder.  Line was trimmed at 20cm for a midline.

## 2022-12-12 NOTE — PROGRESS NOTES
GI Daily Progress Note    Assessment/Plan:      Upper GI bleed    Metabolic encephalopathy    Hyperammonemia (HCC)    Chronic liver failure without hepatic coma (HCC)       LOS: 6 days     Lionel Whitehead is a 77 y.o. male who was admitted with Upper GI bleed. He reports the symptoms are improving with treatment. More cooperative but still not verbal but is following commands. HGB an Plt stabl an his B/C are good. LFT cntinue to improve but with the IVFs his abd is developing ascites that wasn't there last week on US.    Subjective:    Patient expresses Nonverbal  Patient denies Nonverbal    Objective:    Vital signs in last 24 hours:  Temp:  [97.5 °F (36.4 °C)-98.3 °F (36.8 °C)] 97.5 °F (36.4 °C)  Heart Rate:  [73-88] 85  Resp:  [16-22] 18  BP: (102-161)/(71-96) 134/71    Intake/Output last 3 shifts:  I/O last 3 completed shifts:  In: 9709.4 [I.V.:2214.4; Other:5545; NG/GT:1550; IV Piggyback:400]  Out: 3820 [Urine:1970; Stool:1850]  Intake/Output this shift:  I/O this shift:  In: 2913 [I.V.:360; Other:1495; NG/GT:858; IV Piggyback:200]  Out: 1100 [Urine:500; Stool:600]    Physical Exam:Abdomen  Sounds Normal Active Bowel Sounds   Distension Soft, Distended and Ascites   Tenderness Nontender     Imaging Results (Last 72 Hours)     Procedure Component Value Units Date/Time    XR Abdomen KUB [[id number]] Collected: 12/11/22 2302     Updated: 12/11/22 2305    Narrative:      CR Abdomen 1 Vw    INDICATION:   Tube placement.    COMPARISON:   12/8/2022    FINDINGS:  AP radiograph(s) of the abdomen. NG tube has been removed. A feeding tube is looped in the proximal stomach. Visualized bowel gas pattern is normal.      Impression:      Feeding tube looped in the proximal stomach. Consider advancing the tube.    Signer Name: Augie Juan MD   Signed: 12/11/2022 11:02 PM   Workstation Name: [email or web address]    [facility]    XR Shoulder 2+ View Right [[id number]] Collected: 12/10/22 1711     Updated: 12/10/22  1713    Narrative:      CR Shoulder Comp Min 2 Vws RT    INDICATION:   Right shoulder pain status post fall 5 days ago.    COMPARISON:   None available.    FINDINGS:   AP internal rotation and AP external rotation views of the right shoulder.   No fracture or dislocation.  Mild degenerative change of the acromioclavicular joint. Humeral head is slightly high riding which may represent a rotator cuff tear.  No foreign  body.      Impression:        1. No acute fracture or dislocation.  2. Slightly high riding humeral head raising concern for possible rotator cuff tear. This can be further evaluated with a nonemergent noncontrast MRI of the right shoulder on an outpatient basis.  3. Mild AC joint arthrosis.    Signer Name: Jersey Cabrera MD   Signed: 12/10/2022 5:11 PM   [facility]: [email or web address]    [facility]          WBC   Date Value Ref Range Status   12/12/2022 7.52 3.40 - 10.80 10*3/mm3 Final     RBC   Date Value Ref Range Status   12/12/2022 2.64 (L) 4.14 - 5.80 10*6/mm3 Final     Hemoglobin   Date Value Ref Range Status   12/12/2022 8.2 (L) 13.0 - 17.7 g/dL Final     Hematocrit   Date Value Ref Range Status   12/12/2022 27.2 (L) 37.5 - 51.0 % Final     MCV   Date Value Ref Range Status   12/12/2022 103.0 (H) 79.0 - 97.0 fL Final     MCH   Date Value Ref Range Status   12/12/2022 31.1 26.6 - 33.0 pg Final     MCHC   Date Value Ref Range Status   12/12/2022 30.1 (L) 31.5 - 35.7 g/dL Final     RDW   Date Value Ref Range Status   12/12/2022 17.9 (H) 12.3 - 15.4 % Final     RDW-SD   Date Value Ref Range Status   12/12/2022 66.4 (H) 37.0 - 54.0 fl Final     MPV   Date Value Ref Range Status   12/12/2022 12.6 (H) 6.0 - 12.0 fL Final     Platelets   Date Value Ref Range Status   12/12/2022 59 (L) 140 - 450 10*3/mm3 Final     Neutrophil %   Date Value Ref Range Status   12/12/2022 83.3 (H) 42.7 - 76.0 % Final     Lymphocyte %   Date Value Ref Range Status   12/12/2022 6.4 (L) 19.6 - 45.3 %  Final     Monocyte %   Date Value Ref Range Status   12/12/2022 8.6 5.0 - 12.0 % Final     Eosinophil %   Date Value Ref Range Status   12/12/2022 0.3 0.3 - 6.2 % Final     Basophil %   Date Value Ref Range Status   12/12/2022 0.1 0.0 - 1.5 % Final     Immature Grans %   Date Value Ref Range Status   12/12/2022 1.3 (H) 0.0 - 0.5 % Final     Neutrophils, Absolute   Date Value Ref Range Status   12/12/2022 6.26 1.70 - 7.00 10*3/mm3 Final     Lymphocytes, Absolute   Date Value Ref Range Status   12/12/2022 0.48 (L) 0.70 - 3.10 10*3/mm3 Final     Monocytes, Absolute   Date Value Ref Range Status   12/12/2022 0.65 0.10 - 0.90 10*3/mm3 Final     Eosinophils, Absolute   Date Value Ref Range Status   12/12/2022 0.02 0.00 - 0.40 10*3/mm3 Final     Basophils, Absolute   Date Value Ref Range Status   12/12/2022 0.01 0.00 - 0.20 10*3/mm3 Final     Immature Grans, Absolute   Date Value Ref Range Status   12/12/2022 0.10 (H) 0.00 - 0.05 10*3/mm3 Final     nRBC   Date Value Ref Range Status   12/12/2022 0.3 (H) 0.0 - 0.2 /100 WBC Final       Glucose   Date Value Ref Range Status   12/12/2022 174 (H) 65 - 99 mg/dL Final     Sodium   Date Value Ref Range Status   12/12/2022 154 (H) 136 - 145 mmol/L Final     Potassium   Date Value Ref Range Status   12/12/2022 3.8 3.5 - 5.2 mmol/L Final     CO2   Date Value Ref Range Status   12/12/2022 21.5 (L) 22.0 - 29.0 mmol/L Final     Chloride   Date Value Ref Range Status   12/12/2022 123 (H) 98 - 107 mmol/L Final     Anion Gap   Date Value Ref Range Status   12/12/2022 9.5 5.0 - 15.0 mmol/L Final     Creatinine   Date Value Ref Range Status   12/12/2022 1.12 0.76 - 1.27 mg/dL Final     BUN   Date Value Ref Range Status   12/12/2022 47 (H) 8 - 23 mg/dL Final     BUN/Creatinine Ratio   Date Value Ref Range Status   12/12/2022 42.0 (H) 7.0 - 25.0 Final     Calcium   Date Value Ref Range Status   12/12/2022 7.9 (L) 8.6 - 10.5 mg/dL Final     Alkaline Phosphatase   Date Value Ref Range Status    12/12/2022 267 (H) 39 - 117 U/L Final     Total Protein   Date Value Ref Range Status   12/12/2022 5.0 (L) 6.0 - 8.5 g/dL Final     ALT (SGPT)   Date Value Ref Range Status   12/12/2022 592 (H) 1 - 41 U/L Final     AST (SGOT)   Date Value Ref Range Status   12/12/2022 31 1 - 40 U/L Final     Total Bilirubin   Date Value Ref Range Status   12/12/2022 1.7 (H) 0.0 - 1.2 mg/dL Final     Albumin   Date Value Ref Range Status   12/12/2022 2.60 (L) 3.50 - 5.20 g/dL Final     Globulin   Date Value Ref Range Status   12/12/2022 2.4 gm/dL Final     HE  Variceal Bleed S/P EGD w banding 12/6  Shock liver  Alcoholic Cirrhosis  Thrombocytopenia  Elevated AFP  Ascites    Will proceed to CT of live tomorrow and paracentesis  Well with labs.

## 2022-12-13 NOTE — CASE MANAGEMENT/SOCIAL WORK
Continued Stay Note  BOUBACAR Dawson     Patient Name: Lionel Whitehead  MRN: [id number]  Today's Date: 12/13/2022    Admit Date: 12/5/2022    Plan: from home w wife, plan undetermined   Discharge Plan     Row Name 12/13/22 1556       Plan    Plan from home w wife, plan undetermined    Plan Comments Follow up visit with wife at bedside, [facility]. He is sitting up, dozing. Mrs MYRIAM Fuchs  confirms patient had been living at home with her to assist as needed prior to this admission. She acknowledges she cannot provide care for the patient at home in his current condition. Discussion of potential skilled nursing care/rehab at dc. List provided for her to review and select a facility for referrals. Support given. CM will continue to follow to assist with dc needs.               Discharge Codes    No documentation.                     Matt Lee RN

## 2022-12-13 NOTE — PLAN OF CARE
Goal Outcome Evaluation:              Outcome Evaluation: Pt's LOC continues to improve slowly, VSS, rested well overnight. F/c output continues to be slightly low. Stool yellow brown, soft consistancy, will george to remove rectal tube. Sitter at bedside to remind pt to not pull on lines or tubes. plan for abd Ct and k[place] of abdomen scheduled for today. DL midline placed yesterday.

## 2022-12-13 NOTE — PLAN OF CARE
Goal Outcome Evaluation:              Outcome Evaluation: SLP: Attempted clinical swallow eval this am. Pt arouses and is able to respond to simple yes/no personal questions. He is able to follow simple one step commands inconsistently. Arousal level is also variable. Pt not as verbal as yesterday. Attempted oral swab w/minimal success getting pt to attempt to suck. Attempted small amount on spoon but pt unable to close mouth to take water. No further po attempted. Pt to go for paracentesis today. Will f/u this afternoon as able. If no significant change, will discontnue johanna and recomend to reorder when pt able to fully participate.

## 2022-12-13 NOTE — THERAPY EVALUATION
Acute Care - Speech Language Pathology   Swallow Initial Evaluation  [place]     Patient Name: Lionel Whitehead  : 1945  MRN: [id number]  Today's Date: 2022               Admit Date: 2022    Visit Dx:     ICD-10-CM ICD-9-CM   1. Upper GI bleed  K92.2 578.9     Patient Active Problem List   Diagnosis   • Upper GI bleed   • Metabolic encephalopathy   • Hyperammonemia (HCC)   • Chronic liver failure without hepatic coma (HCC)     Past Medical History:   Diagnosis Date   • History of esophageal varices    • Restless leg syndrome      Past Surgical History:   Procedure Laterality Date   • ENDOSCOPY     • ENDOSCOPY W/ BANDING N/A 2022    Procedure: ESOPHAGOGASTRODUODENOSCOPY WITH BANDING;  Surgeon: Son Saleem MD;  Location: [facility];  Service: Gastroenterology;  Laterality: N/A;  varices       SLP Recommendation and Plan  SLP Swallowing Diagnosis: unable to assess (unable to fully assess due to curent mental status.) (22)  SLP Diet Recommendation: NPO, temporary alternate methods of nutrition/hydration (22)  Recommended Precautions and Strategies: general aspiration precautions, reflux precautions (22)  SLP Rec. for Method of Medication Administration: meds via alternate route (22)     Monitor for Signs of Aspiration: notify SLP if any concerns (22)  Recommended Diagnostics: reassess via clinical swallow evaluation (22)     Anticipated Discharge Disposition (SLP): unknown (22)  Rehab Potential/Prognosis, Swallowing: re-evaluate goals as necessary (22)                        Patient/Family Concerns, Anticipated Discharge Disposition (SLP): Pt unable to state at this time (22)                     Outcome Evaluation: SLP: Attempted clinical swallow eval this am. Pt arouses and is able to respond to simple yes/no personal questions. He is able to follow simple one step commands  inconsistently. Arousal level is also variable. Pt not as verbal as yesterday. Attempted oral swab w/minimal success getting pt to attempt to suck. Attempted small amount on spoon but pt unable to close mouth to take water. No further po attempted. Pt to go for paracentesis today. Will f/u this afternoon as able. If no significant change, will discontnue johanna and recomend to reorder when pt able to fully participate.      SWALLOW EVALUATION (last 72 hours)     SLP Adult Swallow Evaluation     Row Name 12/13/22 0900 12/13/22 0800 12/12/22 1150 12/10/22 1233          Rehab Evaluation    Document Type -- evaluation  -AD other (see comments)  -AD --     Subjective Information -- --  Pt not verbally responding.  -AD -- --     Patient Observations -- lethargic  varied alertness  -AD -- --     Patient/Family/Caregiver Comments/Observations -- Pt seen in bed, upright near 90 degrees. Pt able to open his eyes and nods yes/no to personal questions. He does not respond verbally. Opens nad closes eyes periodically. He does follow simple commands for opening his mouth and closing around swab. Limited following commands overall and limited effort overall due to current cognitive status.  -AD Pt was seen at bedside for attempted swallow eval with wife present. Mental status fluctuates Able to follow some simple, 1-step commands, but not consistently. Mouth is very dry and dried secretions noted in oral cavity on tongue and palate, however, pt somewhat resistant with more aggressive oral care . Able to tolerate swab with slight moisture. Startles at times in response to readmission of swab. Difficulty getting pt to attempt to swallow but eventually does after significant delay. Pt demonstrating confusion and telling his wife he is ready to go home. Pt continues to not be ready to fully participate in swallow evaluation or for safe po intake at this time. Will continue to follow and evaluate as able. Wife and RN, Ramakrishna,  understanding. Recommend to continue NPO status.  -AD --     Session Not Performed -- -- -- unable to evaluate, medical status change  -AD     Evaluation Not Performed, Comment -- -- -- Spoke with ABEL Durbin, via phone x2. Pt continues to not be alert enough to participate in a clinical swallow eval. Will check tomorrow.  -AD     Patient Effort -- fair  -AD -- --     Comment -- Limitations due to current medical status/confusion.  -AD -- --     Symptoms Noted During/After Treatment -- fatigue;other (see comments)  varied cognitive status  -AD -- --        General Information    Patient Profile Reviewed -- yes  -AD -- --     Pertinent History Of Current Problem -- Pt is a 76 y/o male referred for a swallow eval following hepatic encephalopathy. Admitted for UGI bleed and s/p EGD with banding. Diagnosed with esophageal varices, gastric varices. Swallow eval has been attempted since 12/8/22 w/o success due to mental status changes.  -AD -- --     Current Method of Nutrition -- NPO  -AD -- --     Precautions/Limitations, Vision -- other (see comments)  FAY  -AD -- --     Precautions/Limitations, Hearing -- other (see comments)  FAY, does respond to his name when called  -AD -- --     Prior Level of Function-Communication -- cognitive-linguistic impairment;other (see comments)  difficult to assess due to current encephalopathy.  -AD -- --     Prior Level of Function-Swallowing -- no diet consistency restrictions;regular textures;thin liquids  per spouse  -AD -- --     Plans/Goals Discussed with -- other (see comments)  Yesica ROMAN  -AD -- --     Barriers to Rehab -- medically complex  -AD -- --     Patient's Goals for Discharge -- patient could not state  -AD -- --     Family Goals for Discharge -- patient able to return to PO diet  -AD -- --        Pain    Additional Documentation -- --  no pain reported or indicated  -AD -- --        Oral Motor Structure and Function    Oral Lesions or Structural Abnormalities and/or  variants -- none noted  -AD -- --     Dentition Assessment -- natural, present and adequate;other (see comments)  difficult to fully assess due to limited following commands and mouth opening.  -AD -- --     Secretion Management -- dried secretions in oral cavity  significant dry oral cavity  -AD -- --     Mucosal Quality -- dry  -AD -- --     Volitional Swallow -- unable to elicit  -AD -- --     Volitional Cough -- unable to elicit  -AD -- --        Oral Musculature and Cranial Nerve Assessment    Oral Motor General Assessment -- unable to assess  -AD -- --     Oral Motor, Comment -- Unable to assess due to mental status  -AD -- --        General Eating/Swallowing Observations    Respiratory Support Currently in Use -- room air  -AD -- --     Positioning During Eating -- upright in bed  near 90 degrees  -AD -- --     Utensils Used -- other (see comments)  swab; attempted spoon 1x  -AD -- --     Consistencies Trialed -- thin liquids  -AD -- --        Respiratory    Respiratory Status -- WFL;room air  -AD -- --        Clinical Swallow Eval    Oral Prep Phase -- impaired  -AD -- --     Oral Transit -- impaired  -AD -- --     Oral Residue -- --  unable to assess  -AD -- --     Pharyngeal Phase -- --  unable to assess  -AD -- --     Esophageal Phase -- --  unable to assess  -AD -- --     Clinical Swallow Evaluation Summary -- Pt with limited participation due to current mental status. Able to suck on straw, but unable to elicit swallow, in part due to dry oral cavity. Limited trials due to variation in alertness. Attempted 1/3 spoon size of water, but pt not maintaining alertness and ability to take liquid from spoon. Discontinued attempt and no further po attempts at this time. Recommend continue NPO status w/Dobhoff tube feedings. Will f/u and if no significant change at that time, will discontinue order and recommend to reorder when mental status improves and pt able to fully participate.  -AD -- --        Oral Prep  Concerns    Oral Prep Concerns -- reduced lip opening;incomplete or weak lip closure around spoon  -AD -- --     Reduced Lip Opening -- thin  -AD -- --     Incomplete or Weak Lip Closure Around Spoon -- thin  -AD -- --     Oral Prep Concerns, Comment -- Pt allows for swab and spoon in mouth, weak closure around both. Poor suck and significant limitation in lingual movement.  -AD -- --        Oral Transit Concerns    Oral Transit Concerns -- unable to initiate oral transit;other (see comments)  -AD -- --     Oral Transit Concerns, Comment -- Dry oral cavity may be keeping pt from having anything to transit or swallow. Pt unable to take thins from spoon due to weak closure and fluctuations in mental status. SLP did not feel pt was safe enough to attempt any other trials at this time.  -AD -- --        SLP Evaluation Clinical Impression    SLP Swallowing Diagnosis -- unable to assess  unable to fully assess due to curent mental status.  -AD -- --     Functional Impact -- risk of aspiration/pneumonia;risk of malnutrition;risk of dehydration  -AD -- --     Rehab Potential/Prognosis, Swallowing -- re-evaluate goals as necessary  -AD -- --        Recommendations    SLP Diet Recommendation -- NPO;temporary alternate methods of nutrition/hydration  -AD -- --     Recommended Diagnostics -- reassess via clinical swallow evaluation  -AD -- --     Recommended Precautions and Strategies -- general aspiration precautions;reflux precautions  -AD -- --     Oral Care Recommendations -- Oral Care BID/PRN;Suction toothbrush  -AD -- --     SLP Rec. for Method of Medication Administration -- meds via alternate route  -AD -- --     Monitor for Signs of Aspiration -- notify SLP if any concerns  -AD -- --     Anticipated Discharge Disposition (SLP) -- unknown  -AD -- --     Patient/Family Concerns, Anticipated Discharge Disposition (SLP) -- Pt unable to state at this time  -AD -- --        Swallow Goals (SLP)    Swallow LTGs -- Swallow Long  Term Goal (free text)  -AD -- --        (LTG) Swallow    (LTG) Swallow Pt will be able to participate in re-evaluation of swallowing as mental status improves in order to make determinationfor advancement to least restrictive po diet in 2 days.  -AD Pt will be able to participate in re-evaluation of swallowing as mental status improves in order to make determinationfor advancement to po diet at least restrictive  -AD -- --     [place] (Swallow Long Term Goal) with 1:1 assist/ supervision  -AD -- -- --     Time Frame (Swallow Long Term Goal) --  2 days  -AD -- -- --     Barriers (Swallow Long Term Goal) encephalopathy  -AD -- -- --     Progress/Outcomes (Swallow Long Term Goal) new goal  -AD -- -- --           User Key  (r) = Recorded By, (t) = Taken By, (c) = Cosigned By    Initials Name Effective Dates    Crissy Vargas, MS CCC-SLP 06/16/21 -                 EDUCATION  The patient has been educated in the following areas:   Dysphagia (Swallowing Impairment) NPO rationale. Pt is unable to demonstrate understanding of teaching at this time.        SLP GOALS     Row Name 12/13/22 0900       (LTG) Swallow    (LTG) Swallow Pt will be able to participate in re-evaluation of swallowing as mental status improves in order to make determinationfor advancement to least restrictive po diet in 2 days.  -AD    [place] (Swallow Long Term Goal) with 1:1 assist/ supervision  -AD    Time Frame (Swallow Long Term Goal) --  2 days  -AD    Barriers (Swallow Long Term Goal) encephalopathy  -AD    Progress/Outcomes (Swallow Long Term Goal) new goal  -AD          User Key  (r) = Recorded By, (t) = Taken By, (c) = Cosigned By    Initials Name Provider Type    Crissy Vargas, MS CCC-SLP Speech and Language Pathologist                   Time Calculation:    Time Calculation- SLP     Row Name 12/13/22 1032             Time Calculation- SLP    SLP Start Time 0800  -AD      SLP Stop Time 0830  -AD      SLP Time  Calculation (min) 30 min  -AD      Total Timed Code Minutes- SLP 0 minute(s)  -AD      SLP Non-Billable Time (min) 0 min  -AD      SLP Received On 12/13/22  -AD         Untimed Charges    SLP Eval/Re-eval  ST Eval Oral Pharyng Swallow - 92610  -AD      69520-[id number] Eval Oral Pharyng Swallow Minutes 30  -AD         Total Minutes    Untimed Charges Total Minutes 30  -AD       Total Minutes 30  -AD            User Key  (r) = Recorded By, (t) = Taken By, (c) = Cosigned By    Initials Name Provider Type    AD Crissy Parrish, MS CCC-SLP Speech and Language Pathologist                Therapy Charges for Today     Code Description Service Date Service Provider Modifiers Qty    [id number] HC ST EVAL ORAL PHARYNG SWALLOW 2 12/13/2022 Crissy Parrish MS CCC-SLP GN 1               Crissy Parrish MS CCC-SLP  12/13/2022

## 2022-12-13 NOTE — PROGRESS NOTES
"Hospitalist Team      Patient Care Team:  Reymundo Healy MD (Tony) as PCP - General (Internal Medicine)        Chief Complaint: Follow-up Metabolic Encephalopathy    Subjective    No acute events overnight.  Mr. Nguyễn is much more interactive with me that the last time I saw him.  He has not been up from bed yet.  Plan for paracentesis today.    Objective    Vital Signs  Temp:  [97.5 °F (36.4 °C)-98.6 °F (37 °C)] 98.6 °F (37 °C)  Heart Rate:  [77-92] 90  Resp:  [18-24] 22  BP: (122-158)/(57-93) 154/87  Oxygen Therapy  SpO2: 98 %  Pulse Oximetry Type: Continuous  Device (Oxygen Therapy): room air  Flow (L/min): 2  Oxygen Concentration (%): 31  ETCO2 (mmHg): 31 mmHg}    Flowsheet Rows    Flowsheet Row First Filed Value   Admission Height 185.4 cm (73\") Documented at 12/05/2022 2348   Admission Weight 107 kg (235 lb) Documented at 12/05/2022 2348          Physical Exam:    General: Appears older than stated age in no acute distress.  HEENT: PERRL.  EOMI.  Gaze is conjugate.  Lungs: Breath sounds are diminished throughout all fields.  Respirations are non-labored.  CV: Regular rate and rhythm.  No murmurs appreciated.  Radial and pedal pulses are 2+ and symmetric.  Abdomen: Obese and soft.  Bowel sounds are diminished.  MSK: No clubbing and cyanosis.  Neuro: CN II-XII grossly intact.  Psych: GCS 12    Results Review:     I reviewed the patient's new clinical results.    Lab Results (last 24 hours)     Procedure Component Value Units Date/Time    POC Glucose Once [[id number]]  (Abnormal) Collected: 12/13/22 0559    Specimen: Blood Updated: 12/13/22 0605     Glucose 198 mg/dL      Comment: Meter: [id number] : [id number] Jan Wilkes RN       Blood Culture - Blood, Hand, Right [[id number]]  (Normal) Collected: 12/08/22 0205    Specimen: Blood from Hand, Right Updated: 12/13/22 0215     Blood Culture No growth at 5 days    Narrative:      Less than seven (7) mL's of blood was collected.  Insufficient quantity " may yield false negative results.    Blood Culture - Blood, Hand, Right [[id number]]  (Normal) Collected: 12/08/22 0205    Specimen: Blood from Hand, Right Updated: 12/13/22 0215     Blood Culture No growth at 5 days    Narrative:      Less than seven (7) mL's of blood was collected.  Insufficient quantity may yield false negative results.    POC Glucose Once [[id number]]  (Abnormal) Collected: 12/12/22 2352    Specimen: Blood Updated: 12/12/22 2358     Glucose 188 mg/dL      Comment: Meter: [id number] : [id number] Jan Wilkes RN             Imaging Results (Last 24 Hours)     ** No results found for the last 24 hours. **          Medication Review:   I have reviewed the patient's current medication list    Current Facility-Administered Medications:   •  labetalol (NORMODYNE,TRANDATE) injection 10 mg, 10 mg, Intravenous, Q2H PRN, Rena Carrera DO, 10 mg at 12/11/22 1357  •  lactated ringers infusion, 9 mL/hr, Intravenous, Continuous PRN, HarperSon adame MD  •  lactulose solution 20 g, 20 g, Nasogastric, Q6H, Rena Carrera DO, 20 g at 12/13/22 0638  •  octreotide (sandoSTATIN) 500 mcg in sodium chloride 0.9 % 100 mL (5 mcg/mL) infusion, 50 mcg/hr, Intravenous, Continuous, HarperSon MD, Last Rate: 10 mL/hr at 12/13/22 0602, 50 mcg/hr at 12/13/22 0602  •  pantoprazole (PROTONIX) 40 mg in 100mL NS IVPB, 8 mg/hr, Intravenous, Continuous, HarperSon MD, Last Rate: 20 mL/hr at 12/13/22 0602, 8 mg/hr at 12/13/22 0602  •  piperacillin-tazobactam (ZOSYN) 4.5 g/100 mL 0.9% NS IVPB (mbp), 4.5 g, Intravenous, Q8H, Rena Carrera DO, Stopped at 12/13/22 0515  •  potassium chloride (K-DUR,KLOR-CON) CR tablet 40 mEq, 40 mEq, Oral, PRN **OR** potassium chloride (KLOR-CON) packet 40 mEq, 40 mEq, Nasogastric, PRN, 40 mEq at 12/10/22 2026 **OR** potassium chloride 10 mEq in 100 mL IVPB, 10 mEq, Intravenous, Q1H PRN, Rena Carrera,   •   sodium chloride 0.9 % flush 10 mL, 10 mL, Intravenous, PRN, Son Saleem MD  •  sodium chloride 0.9 % flush 10 mL, 10 mL, Intravenous, Q12H, Son Saleem MD, 10 mL at 12/12/22 2134  •  sodium chloride 0.9 % flush 10 mL, 10 mL, Intravenous, Q12H, Lionel Mota R, DO, 10 mL at 12/12/22 2133  •  sodium chloride 0.9 % flush 10 mL, 10 mL, Intravenous, Q12H, Lionel Mota R, DO, 10 mL at 12/12/22 2133  •  sodium chloride 0.9 % flush 10 mL, 10 mL, Intravenous, PRN, Lionel Mota R, DO  •  sodium chloride 0.9 % flush 20 mL, 20 mL, Intravenous, PRN, Svetlana, Lionel R, DO  •  sodium chloride 0.9 % infusion 40 mL, 40 mL, Intravenous, PRN, Son Saleem MD, 40 mL at 12/06/22 0620      Assessment & Plan     1.  Metabolic Encephalopathy: Improved, but not back to baseline.  Hypernatremia was improving and Ammonia decreasing.  MRI is negative.    2.  Liver Cirrhosis/Esophageal and Gastric Variceal Bleeding s/p banding: No acute issues.  Continues on PPI and Octreotide.  GI following.  Plan for 3-phase CT scan.    3.  Suspected Aspiration Pneumonia: Will transition to Augmentin through DHT to finish course.    4.  Hypernatremia: As in #1.  Await repeat sodium.    5.  RLS: No acute issues.    Plan for disposition: Predicated on hospital course.    Kd Carter MD  12/13/22  09:21 EST

## 2022-12-13 NOTE — PROGRESS NOTES
Patient briefly seen today.  He is still lethargic but according to nurses is saying a few words and would say his name, etc. note that the MRI did not show any acute stroke.  So I really believe we are dealing with significant hepatic and metabolic encephalopathy only.  Neurology will sign off and follow-up.  Reconsult thanks  Finn Cazares MD

## 2022-12-14 NOTE — PROGRESS NOTES
GI Daily Progress Note    Assessment/Plan:      Upper GI bleed    Metabolic encephalopathy    Hyperammonemia (HCC)    Chronic liver failure without hepatic coma (HCC)       LOS: 7 days     Lionel Whitehead is a 77 y.o. male who was admitted with Upper GI bleed. He reports the symptoms are unchanged. stil encephalopthc, responds with eyes but stil nonverbal, Paracentesis was successful and c/w Portal hypertensionm and NO SBP. 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 Biopsy planned for tomorrow, labs ordered.     Subjective:    Patient expresses Non-verbal  Patient denies Non-verbal    Objective:    Vital signs in last 24 hours:  Temp:  [98 °F (36.7 °C)-98.6 °F (37 °C)] 98 °F (36.7 °C)  Heart Rate:  [82-93] 87  Resp:  [18-24] 20  BP: (138-158)/(71-93) 150/81    Intake/Output last 3 shifts:  I/O last 3 completed shifts:  In: 8948 [I.V.:720; Other:5195; NG/GT:2733; IV Piggyback:300]  Out: 3045 [Urine:1745; Stool:1300]  Intake/Output this shift:  No intake/output data recorded.    Physical Exam:Abdomen  Sounds Normal Active Bowel Sounds   Distension Soft and Distended   Tenderness Nontender     Imaging Results (Last 72 Hours)     Procedure Component Value Units Date/Time    US Paracentesis [[id number]] Collected: 12/13/22 1534    Specimen: Body Fluid Updated: 12/13/22 1538    Narrative:      ULTRASOUND-GUIDED PARACENTESIS, 12/13/2022     HISTORY:   77-year-old male hospital inpatient with abnormal liver function  testing, liver disease, ascites.     CONSENT:   The procedure, risks and alternatives were discussed in detail with the  patient, questions were entertained, and written informed consent was  obtained. Timeout was observed in the procedure room for patient  identification and procedure site verification, and the procedure site  was marked by me. Permanent images were recorded.     PROCEDURE:   Preprocedure ultrasound imaging shows  small-to-moderate volumes of  ascites fluid scattered in multiple pockets in the upper and lower  abdomen. The largest single collection in the right lower quadrant was  targeted for the procedure.     Using sterile technique, 1% lidocaine local anesthetic and real-time  ultrasound guidance, a 5 Sami paracentesis catheter was placed into  the largest identified portion of ascites fluid in the right lower  quadrant, and 4.8 L of straw-colored ascites fluid was removed without  apparent complication.       Impression:      Technically successful ultrasound-guided paracentesis with removal of  4.8 L ascites fluid.     This report was finalized on 12/13/2022 3:36 PM by Dr. Sb Mak MD.       SCANNED - IMAGING [[id number]] Resulted: 12/05/22     Updated: 12/13/22 1322    CT Abdomen With & Without Contrast [[id number]] Collected: 12/13/22 1314     Updated: 12/13/22 1316    Narrative:      CT Abdomen WO W    INDICATION:   Elevated alkaline phosphatase gastrointestinal hemorrhage unspecified.    TECHNIQUE:   CT of the abdomen with and without IV contrast. Coronal and sagittal reconstructions were obtained.  Radiation dose reduction techniques included automated exposure control or exposure modulation based on body size. Count of known CT and cardiac nuc med  studies performed in previous 12 months: 0.     COMPARISON:   None available.    FINDINGS:  Infiltrate seen in the right lung base with minimal emphysematous change. Respiratory motion artifact present.    Moderate nodular morphology of the liver with heterogeneous enhancement present in patient with cirrhosis. Parenchyma is markedly abnormal. No gross arterial enhancement seen. However the vague areas of abnormal low density on portal venous phase could  be cirrhoto- mimetic HCC variant. Correlate with alpha-fetoprotein. The posterior branch of the right portal vein is occluded also concerning for underlying malignancy. There is also thrombosis of the main  portal vein is nonocclusive. Spleen is enlarged  at 14.3 cm.    Mild volume ascites present. Gallbladder full of stones. Mild wall thickening of the jejunum seen but this could be underdistention artifact. There is also thrombosis in the portal confluence and SMV. This could account for the changes in the small  bowel.    GI track not imaged as pelvic CT not requested. There is a catheter within the stomach, coiled in the body. Stomach is not adequately distended to evaluate. Involuntary respiratory motion artifact and streak artifact limiting evaluation.    There is a large right renal pelvic 10 mm stone without delay in enhancement of the right kidney and does not likely obstructed. Moderate atherosclerotic plaque seen in the aorta with infrarenal abdominal aortic aneurysm measuring 3.3 x 3.6 cm.      Impression:        1. 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 Splenomegaly present.  2. There is also suggestion of partial thrombosis of the portal confluence and SMV and could be related to the jejunal wall thickening.  3. Cholelithiasis.  4. Nonobstructing right renal calculus.  5. Significant infiltrate right lung base  6. Infrarenal abdominal aortic aneurysm. Recommend yearly surveillance.    Signer Name: Cherry Chatterjee MD   Signed: 12/13/2022 1:14 PM   Workstation Name: [facility]    [facility]    XR Abdomen KUB [[id number]] Collected: 12/11/22 2302     Updated: 12/11/22 2305    Narrative:      CR Abdomen 1 Vw    INDICATION:   Tube placement.    COMPARISON:   12/8/2022    FINDINGS:  AP radiograph(s) of the abdomen. NG tube has been removed. A feeding tube is looped in the proximal stomach. Visualized bowel gas pattern is normal.      Impression:      Feeding tube looped in the proximal stomach. Consider advancing the tube.    Signer Name: Augie Juan MD   Signed: 12/11/2022  11:02 PM   Workstation Name: [id number]    [facility]          WBC   Date Value Ref Range Status   12/12/2022 7.52 3.40 - 10.80 10*3/mm3 Final     RBC   Date Value Ref Range Status   12/12/2022 2.64 (L) 4.14 - 5.80 10*6/mm3 Final     Hemoglobin   Date Value Ref Range Status   12/12/2022 8.2 (L) 13.0 - 17.7 g/dL Final     Hematocrit   Date Value Ref Range Status   12/12/2022 27.2 (L) 37.5 - 51.0 % Final     MCV   Date Value Ref Range Status   12/12/2022 103.0 (H) 79.0 - 97.0 fL Final     MCH   Date Value Ref Range Status   12/12/2022 31.1 26.6 - 33.0 pg Final     MCHC   Date Value Ref Range Status   12/12/2022 30.1 (L) 31.5 - 35.7 g/dL Final     RDW   Date Value Ref Range Status   12/12/2022 17.9 (H) 12.3 - 15.4 % Final     RDW-SD   Date Value Ref Range Status   12/12/2022 66.4 (H) 37.0 - 54.0 fl Final     MPV   Date Value Ref Range Status   12/12/2022 12.6 (H) 6.0 - 12.0 fL Final     Platelets   Date Value Ref Range Status   12/12/2022 59 (L) 140 - 450 10*3/mm3 Final     Neutrophil %   Date Value Ref Range Status   12/12/2022 83.3 (H) 42.7 - 76.0 % Final     Lymphocyte %   Date Value Ref Range Status   12/12/2022 6.4 (L) 19.6 - 45.3 % Final     Monocyte %   Date Value Ref Range Status   12/12/2022 8.6 5.0 - 12.0 % Final     Eosinophil %   Date Value Ref Range Status   12/12/2022 0.3 0.3 - 6.2 % Final     Basophil %   Date Value Ref Range Status   12/12/2022 0.1 0.0 - 1.5 % Final     Immature Grans %   Date Value Ref Range Status   12/12/2022 1.3 (H) 0.0 - 0.5 % Final     Neutrophils, Absolute   Date Value Ref Range Status   12/12/2022 6.26 1.70 - 7.00 10*3/mm3 Final     Lymphocytes, Absolute   Date Value Ref Range Status   12/12/2022 0.48 (L) 0.70 - 3.10 10*3/mm3 Final     Monocytes, Absolute   Date Value Ref Range Status   12/12/2022 0.65 0.10 - 0.90 10*3/mm3 Final     Eosinophils, Absolute   Date Value Ref Range Status   12/12/2022 0.02 0.00 - 0.40 10*3/mm3 Final     Basophils, Absolute    Date Value Ref Range Status   12/12/2022 0.01 0.00 - 0.20 10*3/mm3 Final     Immature Grans, Absolute   Date Value Ref Range Status   12/12/2022 0.10 (H) 0.00 - 0.05 10*3/mm3 Final     nRBC   Date Value Ref Range Status   12/12/2022 0.3 (H) 0.0 - 0.2 /100 WBC Final       Glucose   Date Value Ref Range Status   12/13/2022 145 (H) 65 - 99 mg/dL Final     Sodium   Date Value Ref Range Status   12/13/2022 145 136 - 145 mmol/L Final     Potassium   Date Value Ref Range Status   12/13/2022 4.6 3.5 - 5.2 mmol/L Final     Comment:     Slight hemolysis detected by analyzer. Results may be affected.     CO2   Date Value Ref Range Status   12/13/2022 14.3 (L) 22.0 - 29.0 mmol/L Final     Chloride   Date Value Ref Range Status   12/13/2022 118 (H) 98 - 107 mmol/L Final     Anion Gap   Date Value Ref Range Status   12/13/2022 12.7 5.0 - 15.0 mmol/L Final     Creatinine   Date Value Ref Range Status   12/13/2022 1.20 0.76 - 1.27 mg/dL Final     BUN   Date Value Ref Range Status   12/13/2022 47 (H) 8 - 23 mg/dL Final     BUN/Creatinine Ratio   Date Value Ref Range Status   12/13/2022 39.2 (H) 7.0 - 25.0 Final     Calcium   Date Value Ref Range Status   12/13/2022 8.4 (L) 8.6 - 10.5 mg/dL Final     Alkaline Phosphatase   Date Value Ref Range Status   12/13/2022 282 (H) 39 - 117 U/L Final     Total Protein   Date Value Ref Range Status   12/13/2022 5.8 (L) 6.0 - 8.5 g/dL Final     ALT (SGPT)   Date Value Ref Range Status   12/13/2022 419 (H) 1 - 41 U/L Final     AST (SGOT)   Date Value Ref Range Status   12/13/2022 37 1 - 40 U/L Final     Comment:     Slight hemolysis detected by analyzer. Results may be affected.     Total Bilirubin   Date Value Ref Range Status   12/13/2022 2.5 (H) 0.0 - 1.2 mg/dL Final     Albumin   Date Value Ref Range Status   12/13/2022 2.60 (L) 3.50 - 5.20 g/dL Final     Globulin   Date Value Ref Range Status   12/13/2022 3.2 gm/dL Final     HE  Variceal Bleed S/P EGD w banding 12/6  Shock liver  Alcoholic  Cirrhosis  Thrombocytopenia  Elevated AFP  Ascites    Long conversation with Wife regarding his very poor prognosis and she is confident in being his healthcare surrogate but will continue to try and clear his encephalopathy, an family will be contacted with his grim prognosis.   Will proceed to Biopsy for tissue diagnosis but if this confirms the diagnosis then care and comfort measures will be appropriate.

## 2022-12-14 NOTE — SIGNIFICANT NOTE
12/14/22 1105   Rehab Evaluation   Session Not Performed patient unavailable for treatment   Evaluation Not Performed, Comment out for biopsy

## 2022-12-14 NOTE — POST-PROCEDURE NOTE
US GUIDED LIVER BIOPSY    US guided LEFT LOBE LIVER BIOPSY    Chart reviewed and patient examined prior to procedure.     3 Core biopsy specimen placed in formalin and sent to laboratory    No conscious sedation was administered. RN remained present throughout procedure and monitored vital signs.     Abundant 1% buffered lidocaine was administered to the liver capsule.     Less than 1ml blood loss.    Pt neha well without immediate complication.     Please refer to full dictation for details.

## 2022-12-14 NOTE — NURSING NOTE
surgi foam dressing obtained from central supply for site dressing post procedure    chlorhexidine prep for site rt upper abdomin site marked [id number]

## 2022-12-14 NOTE — PROGRESS NOTES
Adult Nutrition  Assessment/PES    Patient Name:  Lionel Whitehead  YOB: 1945  MRN: [id number]  Admit Date:  12/5/2022    Assessment Date:  12/14/2022    Comments:  Continue current tube feeding and free water as ordred/indicated.  Noted biopsy today.   Will cont to follow and monitor.      Reason for Assessment     Row Name 12/14/22 1237          Reason for Assessment    Reason For Assessment follow-up protocol     Diagnosis liver disease;neurologic conditions  cirrhosis pending liver bx today , Asp PNA suspected, Varceal bleed banded, paracentesis                Nutrition/Diet History     Row Name 12/14/22 1238          Nutrition/Diet History    Typical Intake (Food/Fluid/EN/PN) Pt off unit at visit. Per notes prognosis noted.                Labs/Tests/Procedures/Meds     Row Name 12/14/22 1238          Labs/Procedures/Meds    Lab Results Reviewed reviewed     Lab Results Comments  H, Na 149 H        Diagnostic Tests/Procedures    Diagnostic Test/Procedure Reviewed reviewed        Medications    Pertinent Medications Reviewed reviewed     Pertinent Medications Comments risaquad                    Nutrition Prescription Ordered     Row Name 12/14/22 1238          Nutrition Prescription PO    Current PO Diet NPO        Nutrition Prescription EN    Product [employer]ers[employer] HN (Jevity 1.2)     TF Delivery Method Continuous     Continuous TF Goal Rate (mL/hr) 80 mL/hr     Water flush (mL)  400 mL     Water Flush Frequency Every 2 hours                Evaluation of Received Nutrient/Fluid Intake     Row Name 12/14/22 1239          EN Evaluation    Number of Days EN Intake Evaluated 2 days     % Goal Volume  100 %                   Problem/Interventions:   Problem 1     Row Name 12/14/22 1239          Nutrition Diagnoses Problem 1    Problem 1 Altered GI Function     Etiology (related to) Medical Diagnosis     Signs/Symptoms (evidenced by) Report/Observation     Resolved? Yes                Problem 2      Row Name 12/14/22 1240          Nutrition Diagnoses Problem 2    Problem 2 Needs Alternate Route     Etiology (related to) Medical Diagnosis;Factors Affecting Nutrition     Signs/Symptoms (evidenced by) Report/Observation                    Intervention Goal     Row Name 12/14/22 1240          Intervention Goal    TF/PN Maintain TF/PN                Nutrition Intervention     Row Name 12/14/22 1240          Nutrition Intervention    RD/Tech Action Follow Tx progress                  Education/Evaluation     Row Name 12/14/22 1240          Education    Education Education not appropriate at this time        Monitor/Evaluation    Monitor Per protocol;I&O;Pertinent labs;TF delivery/tolerance;Weight;Symptoms                 Electronically signed by:  Alejandra Chacon RD  12/14/22 12:41 EST

## 2022-12-14 NOTE — PLAN OF CARE
Goal Outcome Evaluation:  Plan of Care Reviewed With: patient, spouse        Progress: improving  Outcome Evaluation: pt has been much improved this shift. awake alert and following commands. he is starting to converse this evening though voice is very weak and hoarse. he does c/o feeling cogested too. us guided liver biopsy was done todasy with path report expected in 3 days per radiology dept. cont with protonix gtt and octeotride gtt as ordered. has had numkerous bowel movements per day, remains on lactolose and TF is  infusing at 80cc/h.

## 2022-12-14 NOTE — PROGRESS NOTES
"Hospitalist Team      Patient Care Team:  Reymundo Healy MD (Tony) as PCP - General (Internal Medicine)      Chief Complaint:  Follow-up Encephalopathy; Liver mass    Subjective    No acute events overnight.  Staff were able to get him up in the chair for a time period yesterday, and paracentesis was also performed.  His mentation is unchanged.  He continues to move his bowels.    Objective    Vital Signs  Temp:  [97.3 °F (36.3 °C)-98.2 °F (36.8 °C)] 97.4 °F (36.3 °C)  Heart Rate:  [79-93] 87  Resp:  [18-24] 24  BP: (126-155)/(67-93) 134/73  Oxygen Therapy  SpO2: 98 %  Pulse Oximetry Type: Continuous  Device (Oxygen Therapy): room air  Flow (L/min): 2  Oxygen Concentration (%): 31  ETCO2 (mmHg): 31 mmHg}    Flowsheet Rows    Flowsheet Row First Filed Value   Admission Height 185.4 cm (73\") Documented at 12/05/2022 2348   Admission Weight 107 kg (235 lb) Documented at 12/05/2022 2348        Physical Exam:    General: Appears older than stated age in no acute distress.  HEENT: PERRL.  No icterus noted.  Lungs: Breath sounds are diminished throughout all fields.  Respirations are non-labored.  CV: Regular rate and rhythm.  No murmurs appreciated.  Radial and pedal pulses are 2+ and symmetric.  Abdomen: Obese, soft, and non-tender w/ active bowel sounds.  MSK: No C/C/E.  Skin: No suspicious rash.  Psych: GCS 12    Results Review:     I reviewed the patient's new clinical results.    Lab Results (last 24 hours)     Procedure Component Value Units Date/Time    Protime-INR [[id number]]  (Abnormal) Collected: 12/14/22 0511    Specimen: Blood Updated: 12/14/22 0611     Protime 20.9 Seconds      INR 1.77    Narrative:      Therapeutic Ranges for INR: 2.0-3.0 (PT 20-30)                              2.5-3.5 (PT 25-34)    POC Glucose Once [[id number]]  (Abnormal) Collected: 12/14/22 0556    Specimen: Blood Updated: 12/14/22 0603     Glucose 169 mg/dL      Comment: Meter: [id number] : [id number] Vaughn DOBBINS       CBC " & Differential [[id number]]  (Abnormal) Collected: 12/14/22 0511    Specimen: Blood Updated: 12/14/22 0600    Narrative:      The following orders were created for panel order CBC & Differential.  Procedure                               Abnormality         Status                     ---------                               -----------         ------                     CBC Auto Differential[[id number]]        Abnormal            Final result               Scan Slide[[id number]]                                       Final result                 Please view results for these tests on the individual orders.    Scan Slide [[id number]] Collected: 12/14/22 0511    Specimen: Blood Updated: 12/14/22 0600     Anisocytosis Slight/1+     WBC Morphology Normal     Platelet Morphology Normal    Comprehensive Metabolic Panel [[id number]]  (Abnormal) Collected: 12/14/22 0511    Specimen: Blood Updated: 12/14/22 0550     Glucose 192 mg/dL      BUN 43 mg/dL      Creatinine 0.97 mg/dL      Sodium 149 mmol/L      Potassium 4.3 mmol/L      Chloride 120 mmol/L      CO2 19.4 mmol/L      Calcium 8.1 mg/dL      Total Protein 5.6 g/dL      Albumin 3.10 g/dL      ALT (SGPT) 327 U/L      AST (SGOT) 25 U/L      Alkaline Phosphatase 262 U/L      Total Bilirubin 2.7 mg/dL      Globulin 2.5 gm/dL      A/G Ratio 1.2 g/dL      BUN/Creatinine Ratio 44.3     Anion Gap 9.6 mmol/L      eGFR 81.4 mL/min/1.73      Comment: National Kidney Foundation and American Society of Nephrology (ASN) Task Force recommended calculation based on the Chronic Kidney Disease Epidemiology Collaboration (CKD-EPI) equation refit without adjustment for race.       Narrative:      GFR Normal >60  Chronic Kidney Disease <60  Kidney Failure <15    The GFR formula is only valid for adults with stable renal function between ages 18 and 70.    CBC Auto Differential [[id number]]  (Abnormal) Collected: 12/14/22 0511    Specimen: Blood Updated: 12/14/22 0519     WBC 9.47 10*3/mm3      RBC  3.37 10*6/mm3      Hemoglobin 10.4 g/dL      Hematocrit 34.7 %      .0 fL      MCH 30.9 pg      MCHC 30.0 g/dL      RDW 18.4 %      RDW-SD 66.3 fl      MPV 13.5 fL      Platelets 62 10*3/mm3      Neutrophil % 83.8 %      Lymphocyte % 6.8 %      Monocyte % 5.8 %      Eosinophil % 1.2 %      Basophil % 0.3 %      Immature Grans % 2.1 %      Neutrophils, Absolute 7.94 10*3/mm3      Lymphocytes, Absolute 0.64 10*3/mm3      Monocytes, Absolute 0.55 10*3/mm3      Eosinophils, Absolute 0.11 10*3/mm3      Basophils, Absolute 0.03 10*3/mm3      Immature Grans, Absolute 0.20 10*3/mm3      nRBC 0.4 /100 WBC     POC Glucose Once [[id number]]  (Abnormal) Collected: 12/13/22 2332    Specimen: Blood Updated: 12/13/22 2338     Glucose 180 mg/dL      Comment: Meter: [id number] : [id number] Vaughn DOBBINS       Protein, Body Fluid - Body Fluid, Peritoneum [[id number]] Collected: 12/13/22 1104    Specimen: Body Fluid from Peritoneum Updated: 12/13/22 1629     Protein, Total, Fluid <1.0 g/dL     Narrative:      No Reference Ranges Established.    A serous fluid total fluid (TP) greater than 50 percent of the serum TP suggests the fluid is an exudate.      1. Pleural TP/Serum TP >0.5  2. Pleural LD/Serum LD >0.6  3. Pleural LD >2/3 of the upper limit of normal for serum LDH    This test was developed, it performance characteristics determined and judged suitable for clinical purposes by [facility].  It has not been cleared or approved by the FDA.  The laboratory is regulated under CLIA as qualified to perform high-complexity testing.     Albumin, Fluid - Body Fluid, Peritoneum [[id number]] Collected: 12/13/22 1104    Specimen: Body Fluid from Peritoneum Updated: 12/13/22 1629     Albumin, Fluid <0.20 g/dL     Narrative:      No Reference Ranges Established.    A Serous fluid albumin gradient (serum albumin-fluid) <1.1 g/dL suggests the fluid is an exudate.  Cirrhosis usually results in an ascites fluid  albumin gradient >1.1 g/dL.    This test was developed, its performance characteristics determined and judged suitable for clinical purposes by [facility].  It has not been cleared or approved by the FDA.  The laboratory is regulated under CLIA as qualified to perfom high-complexity testing.      Body Fluid Culture - Body Fluid, Peritoneum [[id number]] Collected: 12/13/22 1104    Specimen: Body Fluid from Peritoneum Updated: 12/13/22 1431     Gram Stain Occasional WBCs seen      No organisms seen    Body Fluid Cell Count With Differential - Body Fluid, Peritoneum [[id number]] Collected: 12/13/22 1104    Specimen: Body Fluid from Peritoneum Updated: 12/13/22 1225    Narrative:      The following orders were created for panel order Body Fluid Cell Count With Differential - Body Fluid, Peritoneum.  Procedure                               Abnormality         Status                     ---------                               -----------         ------                     Body fluid cell count - ...[[id number]]                      Final result               Body fluid differential ...[[id number]]                      Final result                 Please view results for these tests on the individual orders.    Body fluid differential - Body Fluid, Peritoneum [[id number]] Collected: 12/13/22 1104    Specimen: Body Fluid from Peritoneum Updated: 12/13/22 1225     Neutrophils, Fluid 32 %      Lymphocytes, Fluid 68 %     Body fluid cell count - Body Fluid, Peritoneum [[id number]] Collected: 12/13/22 1104    Specimen: Body Fluid from Peritoneum Updated: 12/13/22 1208     Color, Fluid Colorless     Appearance, Fluid Clear     WBC, Fluid 105 /mm3      RBC, Fluid 0 /mm3     Non-gynecologic Cytology [[id number]] Collected: 12/13/22 1104    Specimen: Body Fluid from Abdominal Cavity Updated: 12/13/22 1116    Comprehensive Metabolic Panel [[id number]]  (Abnormal) Collected: 12/13/22 1021    Specimen: Blood Updated:  "12/13/22 1045     Glucose 145 mg/dL      BUN 47 mg/dL      Creatinine 1.20 mg/dL      Sodium 145 mmol/L      Potassium 4.6 mmol/L      Comment: Slight hemolysis detected by analyzer. Results may be affected.        Chloride 118 mmol/L      CO2 14.3 mmol/L      Calcium 8.4 mg/dL      Total Protein 5.8 g/dL      Albumin 2.60 g/dL      ALT (SGPT) 419 U/L      AST (SGOT) 37 U/L      Comment: Slight hemolysis detected by analyzer. Results may be affected.        Alkaline Phosphatase 282 U/L      Total Bilirubin 2.5 mg/dL      Globulin 3.2 gm/dL      A/G Ratio 0.8 g/dL      BUN/Creatinine Ratio 39.2     Anion Gap 12.7 mmol/L      eGFR 63.1 mL/min/1.73      Comment: National Kidney Foundation and American Society of Nephrology (ASN) Task Force recommended calculation based on the Chronic Kidney Disease Epidemiology Collaboration (CKD-EPI) equation refit without adjustment for race.       Narrative:      GFR Normal >60  Chronic Kidney Disease <60  Kidney Failure <15    The GFR formula is only valid for adults with stable renal function between ages 18 and 70.    Procalcitonin [[id number]]  (Abnormal) Collected: 12/12/22 0513    Specimen: Blood Updated: 12/13/22 1005     Procalcitonin 0.72 ng/mL     Narrative:      As a Marker for Sepsis (Non-Neonates):    1. <0.5 ng/mL represents a low risk of severe sepsis and/or septic shock.  2. >2 ng/mL represents a high risk of severe sepsis and/or septic shock.    As a Marker for Lower Respiratory Tract Infections that require antibiotic therapy:    PCT on Admission    Antibiotic Therapy       6-12 Hrs later    >0.5                Strongly Recommended  >0.25 - <0.5        Recommended   0.1 - 0.25          Discouraged              Remeasure/reassess PCT  <0.1                Strongly Discouraged     Remeasure/reassess PCT    As 28 day mortality risk marker: \"Change in Procalcitonin Result\" (>80% or <=80%) if Day 0 (or Day 1) and Day 4 values are available. Refer to " http://www.[facility]-pct-calculator.com    Change in PCT <=80%  A decrease of PCT levels below or equal to 80% defines a positive change in PCT test result representing a higher risk for 28-day all-cause mortality of patients diagnosed with severe sepsis for septic shock.    Change in PCT >80%  A decrease of PCT levels of more than 80% defines a negative change in PCT result representing a lower risk for 28-day all-cause mortality of patients diagnosed with severe sepsis or septic shock.             Imaging Results (Last 24 Hours)     Procedure Component Value Units Date/Time    US Paracentesis [[id number]] Collected: 12/13/22 1534    Specimen: Body Fluid Updated: 12/13/22 1538    Narrative:      ULTRASOUND-GUIDED PARACENTESIS, 12/13/2022     HISTORY:   77-year-old male hospital inpatient with abnormal liver function  testing, liver disease, ascites.     CONSENT:   The procedure, risks and alternatives were discussed in detail with the  patient, questions were entertained, and written informed consent was  obtained. Timeout was observed in the procedure room for patient  identification and procedure site verification, and the procedure site  was marked by me. Permanent images were recorded.     PROCEDURE:   Preprocedure ultrasound imaging shows small-to-moderate volumes of  ascites fluid scattered in multiple pockets in the upper and lower  abdomen. The largest single collection in the right lower quadrant was  targeted for the procedure.     Using sterile technique, 1% lidocaine local anesthetic and real-time  ultrasound guidance, a 5 [place] paracentesis catheter was placed into  the largest identified portion of ascites fluid in the right lower  quadrant, and 4.8 L of straw-colored ascites fluid was removed without  apparent complication.       Impression:      Technically successful ultrasound-guided paracentesis with removal of  4.8 L ascites fluid.     This report was finalized on 12/13/2022 3:36 PM by Dr. Basurto  MD Obdulio.       SCANNED - IMAGING [[id number]] Resulted: 12/05/22     Updated: 12/13/22 1322    CT Abdomen With & Without Contrast [[id number]] Collected: 12/13/22 1314     Updated: 12/13/22 1316    Narrative:      CT Abdomen WO W    INDICATION:   Elevated alkaline phosphatase gastrointestinal hemorrhage unspecified.    TECHNIQUE:   CT of the abdomen with and without IV contrast. Coronal and sagittal reconstructions were obtained.  Radiation dose reduction techniques included automated exposure control or exposure modulation based on body size. Count of known CT and cardiac nuc med  studies performed in previous 12 months: 0.     COMPARISON:   None available.    FINDINGS:  Infiltrate seen in the right lung base with minimal emphysematous change. Respiratory motion artifact present.    Moderate nodular morphology of the liver with heterogeneous enhancement present in patient with cirrhosis. Parenchyma is markedly abnormal. No gross arterial enhancement seen. However the vague areas of abnormal low density on portal venous phase could  be cirrhoto- mimetic HCC variant. Correlate with alpha-fetoprotein. The posterior branch of the right portal vein is occluded also concerning for underlying malignancy. There is also thrombosis of the main portal vein is nonocclusive. Spleen is enlarged  at 14.3 cm.    Mild volume ascites present. Gallbladder full of stones. Mild wall thickening of the jejunum seen but this could be underdistention artifact. There is also thrombosis in the portal confluence and SMV. This could account for the changes in the small  bowel.    GI track not imaged as pelvic CT not requested. There is a catheter within the stomach, coiled in the body. Stomach is not adequately distended to evaluate. Involuntary respiratory motion artifact and streak artifact limiting evaluation.    There is a large right renal pelvic 10 mm stone without delay in enhancement of the right kidney and does not likely  obstructed. Moderate atherosclerotic plaque seen in the aorta with infrarenal abdominal aortic aneurysm measuring 3.3 x 3.6 cm.      Impression:        1. 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 Splenomegaly present.  2. There is also suggestion of partial thrombosis of the portal confluence and SMV and could be related to the jejunal wall thickening.  3. Cholelithiasis.  4. Nonobstructing right renal calculus.  5. Significant infiltrate right lung base  6. Infrarenal abdominal aortic aneurysm. Recommend yearly surveillance.    Signer Name: Cherry Chatterjee MD   Signed: 12/13/2022 1:14 PM   Workstation Name: [facility]    [facility]          Medication Review:   I have reviewed the patient's current medication list    Current Facility-Administered Medications:   •  amoxicillin-clavulanate (AUGMENTIN) 875-125 MG per tablet 1 tablet, 1 tablet, Nasogastric, Q12H, Kd Carter MD, 1 tablet at 12/13/22 2131  •  labetalol (NORMODYNE,TRANDATE) injection 10 mg, 10 mg, Intravenous, Q2H PRN, Rena Carrera DO, 10 mg at 12/11/22 1357  •  lactobacillus acidophilus (RISAQUAD) capsule 1 capsule, 1 capsule, Nasogastric, Daily, Kd Carter MD  •  lactulose solution 20 g, 20 g, Nasogastric, Q6H, Rena Carrera DO, 20 g at 12/14/22 0621  •  octreotide (sandoSTATIN) 500 mcg in sodium chloride 0.9 % 100 mL (5 mcg/mL) infusion, 50 mcg/hr, Intravenous, Continuous, Harper, Son Elaine MD, Last Rate: 10 mL/hr at 12/14/22 0551, 50 mcg/hr at 12/14/22 0551  •  pantoprazole (PROTONIX) 40 mg in 100mL NS IVPB, 8 mg/hr, Intravenous, Continuous, Harper, Son Elaine MD, Last Rate: 20 mL/hr at 12/14/22 0551, 8 mg/hr at 12/14/22 0551  •  potassium chloride (K-DUR,KLOR-CON) CR tablet 40 mEq, 40 mEq, Oral, PRN **OR** potassium chloride (KLOR-CON) packet 40 mEq, 40 mEq, Nasogastric,  PRN, 40 mEq at 12/10/22 2026 **OR** potassium chloride 10 mEq in 100 mL IVPB, 10 mEq, Intravenous, Q1H PRN, Rena Carrera DO  •  sodium chloride 0.9 % flush 10 mL, 10 mL, Intravenous, PRN, Son Saleem MD  •  sodium chloride 0.9 % flush 10 mL, 10 mL, Intravenous, Q12H, Son Saleem MD, 10 mL at 12/13/22 2131  •  sodium chloride 0.9 % flush 10 mL, 10 mL, Intravenous, Q12H, Lionel Mota, DO, 10 mL at 12/13/22 2131  •  sodium chloride 0.9 % flush 10 mL, 10 mL, Intravenous, Q12H, Lionel Mota, , 10 mL at 12/13/22 2131  •  sodium chloride 0.9 % flush 10 mL, 10 mL, Intravenous, PRN, Lionel Mota, DO  •  sodium chloride 0.9 % flush 20 mL, 20 mL, Intravenous, PRN, Lionel oMta, DO  •  sodium chloride 0.9 % infusion 40 mL, 40 mL, Intravenous, PRN, Son Saleem MD, 40 mL at 12/06/22 0620      Assessment & Plan     1.  Metabolic Encephalopathy: Unchanged from yesterday.  Sodium has creeped-up a little, but still lower than previous.  Bowel movements have been appropriate so I do not suspect his ammonia to be high.  We've covered the pneumonia w/ abx.     2.  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 Saleem following.  Continues on Octreotide and Protonix.  Fluid studies not c/w SBP.     3.  Liver Mass: Identified on 3-phase CT scan.  Plan for biopsy today.    4.  Suspected Aspiration Pneumonia: Continues on Augmentin.  As in #1.     5.  Hypernatremia: As in #1.    Plan for disposition: Anticipate transition to Palliative Care    Kd Carter MD  12/14/22  08:11 EST

## 2022-12-15 NOTE — PROGRESS NOTES
"Hospitalist Team      Patient Care Team:  Reymundo Healy MD (Tony) as PCP - General (Internal Medicine)      Chief Complaint: Follow-up Encephalopathy    Subjective    No acute events overnight, and Mr. Nguyễn is much more alert this morning and able to answer some questions.  He reports pain, but cannot given me more information.  He is able to state his name, but nothing else.  Staff report appropriate frequency of BM's.    Objective    Vital Signs  Temp:  [97.4 °F (36.3 °C)-98.4 °F (36.9 °C)] 97.7 °F (36.5 °C)  Heart Rate:  [79-98] 82  Resp:  [14-22] 16  BP: (123-153)/(62-81) 131/76  Oxygen Therapy  SpO2: 100 %  Pulse Oximetry Type: Continuous  Device (Oxygen Therapy): room air  Device (Oxygen Therapy): room air  Flow (L/min): 2  Oxygen Concentration (%): 31  ETCO2 (mmHg): 31 mmHg}    Flowsheet Rows    Flowsheet Row First Filed Value   Admission Height 185.4 cm (73\") Documented at 12/05/2022 2348   Admission Weight 107 kg (235 lb) Documented at 12/05/2022 2348        Physical Exam:    General: Appears older than stated age in no acute distress.  Lungs: Breath sounds are clear throughout all fields.  Excursion is normal.  CV: Regular rate and rhythm.  No murmurs appreciated.  Radial and pedal pulses are 2+ and symmetric.  Abdomen: Obese, soft, and non-tender w/ active bowel sounds.  MSK: No C/C/E.  Skin: No suspicious rash.  Neuro: CN II-XII grossly intact.  Psych: Awake but only oriented to self.    Results Review:     I reviewed the patient's new clinical results.    Lab Results (last 24 hours)     Procedure Component Value Units Date/Time    Body Fluid Culture - Body Fluid, Peritoneum [[id number]] Collected: 12/13/22 1104    Specimen: Body Fluid from Peritoneum Updated: 12/15/22 0636     Body Fluid Culture No growth at 2 days     Gram Stain Occasional WBCs seen      No organisms seen    POC Glucose Once [[id number]]  (Abnormal) Collected: 12/15/22 0602    Specimen: Blood Updated: 12/15/22 0608     Glucose " 181 mg/dL      Comment: Meter: [id number] : [id number] Margie Castaneda RN       POC Glucose Once [[id number]]  (Abnormal) Collected: 12/15/22 0011    Specimen: Blood Updated: 12/15/22 0017     Glucose 197 mg/dL      Comment: Meter: [id number] : [id number] Margie Castaneda RN       SCANNED - LABS [[id number]] Resulted: 12/05/22     Updated: 12/14/22 1350    Non-gynecologic Cytology [[id number]] Collected: 12/13/22 1104    Specimen: Body Fluid from Abdominal Cavity Updated: 12/14/22 1257     Case Report --      [facility]-Gyn Cytology                           Case: [id number]                                  Authorizing Provider:  Son Saleem        Collected:           12/13/2022 11:04 AM                                 MD [place]                                                                   Ordering Location:     [facility]   Received:            12/13/2022 11:16 AM                                 ICU                                                                          Pathologist:           Hanny Allen MD                                                          Specimen:    Abdominal Cavity, Paracentesis                                                              Final Diagnosis --     1. Ascites, Paracentesis:  A. Negative for malignant cells.  B. Benign mesothelial cells and mixed inflammation.       Gross Description --     1. Abdominal Cavity.  1 bottle received containing 1150 ml of pale yellow fluid. 1 thin prep & cellblock.         Microscopic Description --     Cytologically benign appearing mesothelial cells with mild mixed inflammation.  Screened by:  jake        Tissue Pathology Exam [[id number]] Collected: 12/14/22 1159    Specimen: Tissue from Liver Updated: 12/14/22 1225          Imaging Results (Last 24 Hours)     Procedure Component Value Units Date/Time    XR Abdomen KUB [[id number]] Collected: 12/14/22 2204     Updated: 12/14/22 2206    Narrative:       CR Abdomen 1 Vw    INDICATION:   Feeding tube placement.    COMPARISON:   12/11/2022    FINDINGS:  AP radiograph(s) of the abdomen. Flexible feeding tube forms a loop in the body of the stomach with the tip directed toward the gastric fundus. Visualized bowel gas pattern is normal.      Impression:      Feeding tube loops in the body of the stomach with the tip directed toward the fundus of the stomach.    Signer Name: Augie Juan MD   Signed: 12/14/2022 10:04 PM   Workstation Name: [id number]    [facility]    US Guided Liver Biopsy [[id number]] Collected: 12/14/22 1249     Updated: 12/14/22 1418    Narrative:      EXAMINATION:  Ultrasound-guided liver biopsy, 12/14/2022     HISTORY:   77-year-old male hospitalized with abnormal liver function testing,  liver disease and ascites. CT of abdomen demonstrated a cirrhotic liver  with innumerable hypodense liver lesions suspicious for hepatocellular  carcinoma. Ultrasound guided liver biopsy was requested for diagnostic  purposes.      CONSENT:   The procedure, risks and alternatives were discussed in detail with the  patient's wife and written informed consent was obtained. Timeout was  observed in the procedure room for patient identification and procedure  site verification, and the procedure site was marked by me. Permanent  images were recorded.     STERILE TECHNIQUE: Maximal sterile barrier precautions were utilized  during the procedure, including but not limited to: Hand hygiene; use of  chlorhexidine aseptic; sterile gloves, sterile drape/towels, sterile  ultrasound probe cover and sterile ultrasound gel.     PROCEDURE:   Utilizing an anterior approach and ultrasound guidance, 1% buffered  lidocaine utilized for local anesthesia was administered subcutaneously  and to the level of the liver capsule.     Under concurrent real-time ultrasound visualization, a 17-gauge needle  guide was advanced just proximal to an area of diffuse  masslike  infiltration in the medial left hepatic lobe with permanent ultrasound  image documentation. An 18-gauge /10cm BioPince core biopsy needle was  utilized and captured a core of the liver within the abnormal area of  interest. Permanent ultrasound image demonstrating the 2.2 cm throw to  be within the area of interest within the liver. 3 core specimens were  obtained. The cores were placed in formalin. Upon removal of the needle  guide, a Gelfoam slurry was instilled to embolize the biopsy tract.   Hemostasis achieved. A band-aid was applied. The patient's vital signs  remained stable throughout the procedure.       Impression:      Technically successful ultrasound-guided liver biopsy.     This report was finalized on 12/14/2022 2:16 PM by Dr. Dion Goddard MD.             Medication Review:   I have reviewed the patient's current medication list    Current Facility-Administered Medications:   •  furosemide (LASIX) injection 40 mg, 40 mg, Intravenous, BID, HarperSon MD, 40 mg at 12/15/22 0921  •  labetalol (NORMODYNE,TRANDATE) injection 10 mg, 10 mg, Intravenous, Q2H PRN, Rena Carrera DO, 10 mg at 12/11/22 1357  •  lactobacillus acidophilus (RISAQUAD) capsule 1 capsule, 1 capsule, Nasogastric, Daily, Kd Carter MD, 1 capsule at 12/15/22 0921  •  lactulose solution 20 g, 20 g, Nasogastric, Q6H, Rena Carrera DO, 20 g at 12/15/22 0634  •  pantoprazole (PROTONIX) injection 40 mg, 40 mg, Intravenous, Q AM, HarperSon MD, 40 mg at 12/15/22 0631  •  potassium chloride (K-DUR,KLOR-CON) CR tablet 40 mEq, 40 mEq, Oral, PRN **OR** potassium chloride (KLOR-CON) packet 40 mEq, 40 mEq, Nasogastric, PRN, 40 mEq at 12/10/22 2026 **OR** potassium chloride 10 mEq in 100 mL IVPB, 10 mEq, Intravenous, Q1H PRN, Rena Carrera DO  •  sodium chloride 0.9 % flush 10 mL, 10 mL, Intravenous, PRN, Son Saleem MD  •  sodium chloride 0.9 % flush  10 mL, 10 mL, Intravenous, Q12H, Son Saleem MD, 10 mL at 12/14/22 2058  •  sodium chloride 0.9 % flush 10 mL, 10 mL, Intravenous, Q12H, Lionel Mota DO, 10 mL at 12/15/22 0926  •  sodium chloride 0.9 % flush 10 mL, 10 mL, Intravenous, Q12H, Lionel Mota DO, 10 mL at 12/15/22 0921  •  sodium chloride 0.9 % flush 10 mL, 10 mL, Intravenous, PRN, Lionel Mota, DO  •  sodium chloride 0.9 % flush 20 mL, 20 mL, Intravenous, PRN, Lionel Mota, DO  •  sodium chloride 0.9 % infusion 40 mL, 40 mL, Intravenous, PRN, Son Saleem MD, 40 mL at 12/06/22 0620      Assessment & Plan     1.  Metabolic Encephalopathy: Clinically improved.  Continue lactulose.  Will also continue free water as his sodium has increased again.  This is the most alert I have seen him.     2.  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 Saleem follow.  He has completed therapy w/ Octreotide and Protonix so those drips have stopped.     3.  Liver Mass: Awaiting path results.     4.  Suspected Aspiration Pneumonia: He has completed his Augmentin course.     5.  Hypernatremia: As in #1.    6.  Thrombocytopenia: Counts are recovering.  Suspect this is due to splenomegaly.  Continue to monitor.    Plan for disposition: Predicated on hospital course.    Kd Carter MD  12/15/22  09:27 EST

## 2022-12-15 NOTE — PLAN OF CARE
Goal Outcome Evaluation:  Plan of Care Reviewed With: patient, spouse        Progress: improving  Outcome Evaluation: pt has had a great day cognition greatly improved. remains on lactulose with several loose stools this shift.. passed swallow eval approved for puree diet. tolerating well. po intake remains low but Tube feeding continues. no more issues with positioning of tube or flow errors so dobhoff tube remains in place as advised per Dr Saleem. if any flow issue re-occur consider need to XR and advance if advised.

## 2022-12-15 NOTE — PLAN OF CARE
Problem: Adult Inpatient Plan of Care  Goal: Plan of Care Review  Outcome: Ongoing, Progressing  Flowsheets (Taken 12/15/2022 0652)  Plan of Care Reviewed With: patient  Outcome Evaluation: pt has been alert and oriented to self this shift- follows commands. 4 Yellow loose BM's this shift. Midline dressing changed d/t bleeding from site and still continues to bleed. Dose of Lasix given- KUB for dobhoff follow up. Had 1 short run of SVT this Am. Speech to see today.   Goal Outcome Evaluation:  Plan of Care Reviewed With: patient           Outcome Evaluation: pt has been alert and oriented to self this shift- follows commands. 4 Yellow loose BM's this shift. Midline dressing changed d/t bleeding from site and still continues to bleed. Dose of Lasix given- KUB for dobhoff follow up. Had 1 short run of SVT this Am. Speech to see today.

## 2022-12-15 NOTE — THERAPY TREATMENT NOTE
Acute Care - Speech Language Pathology   Swallow Treatment Note  Opal Mac     Patient Name: Lionel Whitehead  : 1945  MRN: [id number]  Today's Date: 12/15/2022               Admit Date: 2022    Visit Dx:     ICD-10-CM ICD-9-CM   1. Upper GI bleed  K92.2 578.9     Patient Active Problem List   Diagnosis   • Upper GI bleed   • Metabolic encephalopathy   • Hyperammonemia (HCC)   • Chronic liver failure without hepatic coma (HCC)     Past Medical History:   Diagnosis Date   • History of esophageal varices    • Restless leg syndrome      Past Surgical History:   Procedure Laterality Date   • ENDOSCOPY     • ENDOSCOPY W/ BANDING N/A 2022    Procedure: ESOPHAGOGASTRODUODENOSCOPY WITH BANDING;  Surgeon: Son Saleem MD;  Location: [facility];  Service: Gastroenterology;  Laterality: N/A;  varices       SLP Recommendation and Plan  SLP Swallowing Diagnosis: functional oral phase, R/O pharyngeal dysphagia (12/15/22 0900)  SLP Diet Recommendation: puree, thin liquids, other (see comments) (advance as tolerated to soft texture, whole.) (12/15/22 1537)  Recommended Precautions and Strategies: upright posture during/after eating, small bites of food and sips of liquid, general aspiration precautions, reflux precautions, fatigue precautions, 1:1 supervision, assist with feeding (12/15/22 1537)  SLP Rec. for Method of Medication Administration: meds whole, with puree, as tolerated (12/15/22 1537)     Monitor for Signs of Aspiration: notify SLP if any concerns (12/15/22 1537)  Recommended Diagnostics: reassess via clinical swallow evaluation (12/15/22 0900)     Anticipated Discharge Disposition (SLP): unknown (12/15/22 1537)  Rehab Potential/Prognosis, Swallowing: re-evaluate goals as necessary (12/15/22 0900)  Therapy Frequency (Swallow): daily, other (see comments), 3 days per week (12/15/22 1537)  Predicted Duration Therapy Intervention (Days): 1 week (12/15/22 1537)        Daily Summary of  Progress (SLP): progress toward functional goals is good (12/15/22 1537)         Patient/Family Concerns, Anticipated Discharge Disposition (SLP): No current concerns per wife. (12/15/22 1537)     Treatment Assessment (SLP): improved, oral dysphagia, pharyngeal dysphagia (12/15/22 1537)  Treatment Assessment Comments (SLP): Pt continues to demonstrate tolerance of ice chips, ice cream with functional oropharyngeal swallow Concerns continue with alertness level at times. Feel when he is fully awake, he can tolerate at least puree foods and liquids. Recommend slow rate and allowing time for pt to fully clear and swallow. (12/15/22 1537)  Plan for Continued Treatment (SLP): continue treatment per plan of care, goals adjusted to reflect functional improvements demonstrated (12/15/22 1537)               SWALLOW EVALUATION (last 72 hours)     SLP Adult Swallow Evaluation     Row Name 12/15/22 1537 12/15/22 0900 12/14/22 1105 12/13/22 0900 12/13/22 0800       Rehab Evaluation    Document Type therapy note (daily note)  -AD re-evaluation  -AD -- -- evaluation  -AD    Subjective Information no complaints  -AD no complaints  -AD -- -- --  Pt not verbally responding.  -AD    Patient Observations alert;cooperative  awakens easily  -AD alert;cooperative  -AD -- -- lethargic  varied alertness  -AD    Patient/Family/Caregiver Comments/Observations Pt was seen in bed, upright with wife present. Initially asleep but able to arouse with verbal. Maintains alertness.  -AD Pt was seen upright in bed. Assisted with positioning after getting pt cleaned up. Pt with significant improvement in alertness and cooperativeness.  -AD -- -- Pt seen in bed, upright near 90 degrees. Pt able to open his eyes and nods yes/no to personal questions. He does not respond verbally. Opens nad closes eyes periodically. He does follow simple commands for opening his mouth and closing around swab. Limited following commands overall and limited effort overall  due to current cognitive status.  -AD    Session Not Performed -- -- patient unavailable for treatment  -AD -- --    Evaluation Not Performed, Comment -- -- out for biopsy  -AD -- --    Patient Effort good  -AD good  -AD -- -- fair  -AD    Comment -- -- -- -- Limitations due to current medical status/confusion.  -AD    Symptoms Noted During/After Treatment none  -AD none;other (see comments)  -AD -- -- fatigue;other (see comments)  varied cognitive status  -AD    Symptoms Noted, Comment -- continues with mild weakness/fatigue, but cooperative and maintains alertness throughout  -AD -- -- --       General Information    Patient Profile Reviewed yes  -AD yes  -AD -- -- yes  -AD    Pertinent History Of Current Problem No changes since am.  -AD Pt w/change in medical status with concern for hepatocellular carcinoma based on liver biopsy.  -AD -- -- Pt is a 76 y/o male referred for a swallow eval following hepatic encephalopathy. Admitted for UGI bleed and s/p EGD with banding. Diagnosed with esophageal varices, gastric varices. Swallow eval has been attempted since 12/8/22 w/o success due to mental status changes.  -AD    Current Method of Nutrition -- NPO;nasogastric feedings;small-bore  -AD -- -- NPO  -AD    Precautions/Limitations, Vision -- WFL;for purposes of eval  -AD -- -- other (see comments)  FAY  -AD    Precautions/Limitations, Hearing -- WFL;for purposes of eval  -AD -- -- other (see comments)  FAY, does respond to his name when called  -AD    Prior Level of Function-Communication -- other (see comments)  metabolic encephalopathy improving  -AD -- -- cognitive-linguistic impairment;other (see comments)  difficult to assess due to current encephalopathy.  -AD    Prior Level of Function-Swallowing -- no diet consistency restrictions;regular textures;thin liquids  -AD -- -- no diet consistency restrictions;regular textures;thin liquids  per spouse  -AD    Plans/Goals Discussed with -- patient;agreed upon  -AD --  -- other (see comments)  Yesica ROMAN  -AD    Barriers to Rehab -- medically complex  -AD -- -- medically complex  -AD    Patient's Goals for Discharge -- return home;return to PO diet  -AD -- -- patient could not state  -AD    Family Goals for Discharge -- -- -- -- patient able to return to PO diet  -AD       Pain    Additional Documentation --  no pain reported or indicated  -AD --  no jpain reported  -AD -- -- --  no pain reported or indicated  -AD       Oral Motor Structure and Function    Oral Lesions or Structural Abnormalities and/or variants -- none noted, does have coating on tongue surface feel may be dried secretions  -AD -- -- none noted  -AD    Dentition Assessment -- natural, present and adequate;other (see comments)  -AD -- -- natural, present and adequate;other (see comments)  difficult to fully assess due to limited following commands and mouth opening.  -AD    Secretion Management -- dried secretions in oral cavity  tongue surface  -AD -- -- dried secretions in oral cavity  significant dry oral cavity  -AD    Mucosal Quality -- dry  -AD -- -- dry  -AD    Volitional Swallow -- delayed  -AD -- -- unable to elicit  -AD    Volitional Cough -- unable to elicit  functional involuntary cough  -AD -- -- unable to elicit  -AD       Oral Musculature and Cranial Nerve Assessment    Oral Motor General Assessment -- generalized oral motor weakness  -AD -- -- unable to assess  -AD    Oral Motor, Comment -- -- -- -- Unable to assess due to mental status  -AD       General Eating/Swallowing Observations    Respiratory Support Currently in Use room air  -AD room air  -AD -- -- room air  -AD    Eating/Swallowing Skills fed by SLP  -AD self-fed;fed by SLP;other (see comments)  Pt controlled cup drinks.  -AD -- -- --    Positioning During Eating upright 90 degree;upright in bed  leaning right due to pillows under left side to offset pressure on his bottom  -AD upright 90 degree;upright in bed  -AD -- -- upright in bed   near 90 degrees  -AD    Utensils Used spoon  -AD spoon;cup  -AD -- -- other (see comments)  swab; attempted spoon 1x  -AD    Consistencies Trialed ice chips;nectar/syrup-thick liquids  -AD pureed;thin liquids  -AD -- -- thin liquids  -AD    Pre SpO2 (%) -- 98  -AD -- -- --    Post SpO2 (%) -- 98  -AD -- -- --       Respiratory    Respiratory Status WFL;room air;during swallowing/eating  -AD WFL;room air;during swallowing/eating  -AD -- -- WFL;room air  -AD       Clinical Swallow Eval    Oral Prep Phase -- WFL  -AD -- -- impaired  -AD    Oral Transit -- WFL  -AD -- -- impaired  -AD    Oral Residue -- WFL  -AD -- -- --  unable to assess  -AD    Pharyngeal Phase -- suspected pharyngeal impairment  -AD -- -- --  unable to assess  -AD    Esophageal Phase -- unremarkable  -AD -- -- --  unable to assess  -AD    Clinical Swallow Evaluation Summary -- Pt demonstrates a functional oral swallow. No significant deficits with thins from spoon, cup and applesauce. Pt did exhibit coughing on one trial of water from cup and coughed up a large tanish/greyish mucous ball. No further coughing or choking noted on trials of thins from cup. Did have occasional throat clearing w/further trials of water by cup. Tolerates applesauce w/o any oral or pharyngeal deficits. Pt does demonstrate some fatigue. No esophageal s/s or complaints reported at this time.  -AD -- -- Pt with limited participation due to current mental status. Able to suck on straw, but unable to elicit swallow, in part due to dry oral cavity. Limited trials due to variation in alertness. Attempted 1/3 spoon size of water, but pt not maintaining alertness and ability to take liquid from spoon. Discontinued attempt and no further po attempts at this time. Recommend continue NPO status w/Dobhoff tube feedings. Will f/u and if no significant change at that time, will discontinue order and recommend to reorder when mental status improves and pt able to fully participate.  -AD        Oral Prep Concerns    Oral Prep Concerns -- -- -- -- reduced lip opening;incomplete or weak lip closure around spoon  -AD    Reduced Lip Opening -- -- -- -- thin  -AD    Incomplete or Weak Lip Closure Around Spoon -- -- -- -- thin  -AD    Oral Prep Concerns, Comment -- -- -- -- Pt allows for swab and spoon in mouth, weak closure around both. Poor suck and significant limitation in lingual movement.  -AD       Oral Transit Concerns    Oral Transit Concerns -- -- -- -- unable to initiate oral transit;other (see comments)  -AD    Oral Transit Concerns, Comment -- -- -- -- Dry oral cavity may be keeping pt from having anything to transit or swallow. Pt unable to take thins from spoon due to weak closure and fluctuations in mental status. SLP did not feel pt was safe enough to attempt any other trials at this time.  -AD       Pharyngeal Phase Concerns    Pharyngeal Phase Concerns -- cough;throat clear  -AD -- -- --    Cough -- thin  from single cup drink  -AD -- -- --    Throat Clear -- thin;other (see comments)  inconsistent from cup following choking  -AD -- -- --    Pharyngeal Phase Concerns, Comment -- Pt w/initial trials of thins w/o s/s of aspiration from spoon and cup. One episode of choking and coughing up large mucous ball. Further trials of thins demonstrate inconsistent throat clearing.  -AD -- -- --       SLP Evaluation Clinical Impression    SLP Swallowing Diagnosis -- functional oral phase;R/O pharyngeal dysphagia  -AD -- -- unable to assess  unable to fully assess due to curent mental status.  -AD    Functional Impact -- risk of aspiration/pneumonia;risk of malnutrition;risk of dehydration  -AD -- -- risk of aspiration/pneumonia;risk of malnutrition;risk of dehydration  -AD    Rehab Potential/Prognosis, Swallowing -- re-evaluate goals as necessary  -AD -- -- re-evaluate goals as necessary  -AD       SLP Treatment Clinical Impressions    Treatment Assessment (SLP) improved;oral dysphagia;pharyngeal  dysphagia  -AD -- -- -- --    Treatment Assessment Comments (SLP) Pt continues to demonstrate tolerance of ice chips, ice cream with functional oropharyngeal swallow Concerns continue with alertness level at times. Feel when he is fully awake, he can tolerate at least puree foods and liquids. Recommend slow rate and allowing time for pt to fully clear and swallow.  -AD -- -- -- --    Daily Summary of Progress (SLP) progress toward functional goals is good  -AD -- -- -- --    Barriers to Overall Progress (SLP) Other (see comments);Medically complex  encephalopathy  -AD -- -- -- --    Plan for Continued Treatment (SLP) continue treatment per plan of care;goals adjusted to reflect functional improvements demonstrated  -AD -- -- -- --    Care Plan Review evaluation/treatment results reviewed;care plan/treatment goals reviewed;risks/benefits reviewed;current/potential barriers reviewed;patient/other agree to care plan  -AD -- -- -- --    Care Plan Review, Other Participant(s) spouse  -AD -- -- -- --       Recommendations    Therapy Frequency (Swallow) daily;other (see comments);3 days per week  -AD -- -- -- --    Predicted Duration Therapy Intervention (Days) 1 week  -AD -- -- -- --    SLP Diet Recommendation puree;thin liquids;other (see comments)  advance as tolerated to soft texture, whole.  -AD ice chips between meals after oral care, with supervision;other (see comments);temporary alternate methods of nutrition/hydration  meds whole in applesauce  -AD -- -- NPO;temporary alternate methods of nutrition/hydration  -AD    Recommended Diagnostics -- reassess via clinical swallow evaluation  -AD -- -- reassess via clinical swallow evaluation  -AD    Recommended Precautions and Strategies upright posture during/after eating;small bites of food and sips of liquid;general aspiration precautions;reflux precautions;fatigue precautions;1:1 supervision;assist with feeding  -AD upright posture during/after eating;small bites of  food and sips of liquid;general aspiration precautions;reflux precautions;fatigue precautions;1:1 supervision  -AD -- -- general aspiration precautions;reflux precautions  -AD    Oral Care Recommendations Oral Care before breakfast, after meals and PRN;Toothbrush  -AD Oral Care BID/PRN;Suction toothbrush  -AD -- -- Oral Care BID/PRN;Suction toothbrush  -AD    SLP Rec. for Method of Medication Administration meds whole;with puree;as tolerated  -AD meds whole;with puree;as tolerated  -AD -- -- meds via alternate route  -AD    Monitor for Signs of Aspiration notify SLP if any concerns  -AD notify SLP if any concerns  -AD -- -- notify SLP if any concerns  -AD    Anticipated Discharge Disposition (SLP) unknown  -AD unknown  -AD -- -- unknown  -AD    Patient/Family Concerns, Anticipated Discharge Disposition (SLP) No current concerns per wife.  -AD No concerns reported per pt at this time.  -AD -- -- Pt unable to state at this time  -AD       Swallow Goals (SLP)    Swallow LTGs Patient will demonstrate progress toward functional swallow for  -AD -- -- -- Swallow Long Term Goal (free text)  -AD    Diet Tolerance Goal Selection (SLP) Patient will tolerate trials of  -AD -- -- -- --       (LTG) Patient will demonstrate progress toward functional swallow for    Diet Texture (Demonstrate progress toward functional swallow) soft to chew (whole) textures  -AD -- -- -- --    Liquid viscosity (Demonstrate progress toward functional swallow) thin liquids  -AD -- -- -- --    [place] (Demonstrate progress towards functional swallow) independently (over 90% accuracy)  -AD -- -- -- --    Time Frame (Demonstrate progress toward functional swallow) 1 week  -AD -- -- -- --    Barriers (Demonstrate progress toward functional swallow) medically complex  -AD -- -- -- --    Progress/Outcomes (Demonstrate progress toward functional swallow) new goal  -AD -- -- -- --       (LTG) Swallow    (LTG) Swallow Pt will be able to participate in  re-evaluation of swallowing as mental status improves in order to make determinationfor advancement to least restrictive po diet in 2 days.  -AD Pt will be able to participate in re-evaluation of swallowing as mental status improves in order to make determinationfor advancement to least restrictive po diet in 2 days.  -AD -- Pt will be able to participate in re-evaluation of swallowing as mental status improves in order to make determinationfor advancement to least restrictive po diet in 2 days.  -AD Pt will be able to participate in re-evaluation of swallowing as mental status improves in order to make determinationfor advancement to po diet at least restrictive  -AD    [place] (Swallow Long Term Goal) with 1:1 assist/ supervision  -AD with 1:1 assist/ supervision  -AD -- with 1:1 assist/ supervision  -AD --    Time Frame (Swallow Long Term Goal) by discharge  -AD by discharge  -AD -- --  2 days  -AD --    Barriers (Swallow Long Term Goal) medically complex  -AD medically complex  -AD -- encephalopathy  -AD --    Progress/Outcomes (Swallow Long Term Goal) goal met  -AD good progress toward goal;goal ongoing  -AD -- new goal  -AD --    Comment (Swallow Long Term Goal) -- Re-eval completed. See Clinical Swallow Summary  -AD -- -- --       (STG) Patient will tolerate trials of    Consistencies Trialed (Tolerate trials) soft to chew (whole) textures;soft to chew (chopped) textures;soft to chew (ground) textures  -AD -- -- -- --    Desired Outcome (Tolerate trials) without signs of distress;with adequate oral prep/transit/clearance  -AD -- -- -- --    [place] (Tolerate trials) independently (over 90% accuracy)  -AD -- -- -- --    Time Frame (Tolerate trials) 1 week  -AD -- -- -- --    Progress/Outcomes (Tolerate trials) new goal  -AD -- -- -- --          User Key  (r) = Recorded By, (t) = Taken By, (c) = Cosigned By    Initials Name Effective Dates    AD Crissy Parrish, MS CCC-SLP 06/16/21 -                  EDUCATION  The patient has been educated in the following areas:   Dysphagia (Swallowing Impairment) Modified Diet Instruction. Wife verbalizes understanding.        SLP GOALS     Row Name 12/15/22 1537 12/15/22 0900 12/13/22 0900       (LTG) Patient will demonstrate progress toward functional swallow for    Diet Texture (Demonstrate progress toward functional swallow) soft to chew (whole) textures  -AD -- --    Liquid viscosity (Demonstrate progress toward functional swallow) thin liquids  -AD -- --    [place] (Demonstrate progress towards functional swallow) independently (over 90% accuracy)  -AD -- --    Time Frame (Demonstrate progress toward functional swallow) 1 week  -AD -- --    Barriers (Demonstrate progress toward functional swallow) medically complex  -AD -- --    Progress/Outcomes (Demonstrate progress toward functional swallow) new goal  -AD -- --       (LTG) Swallow    (LTG) Swallow Pt will be able to participate in re-evaluation of swallowing as mental status improves in order to make determinationfor advancement to least restrictive po diet in 2 days.  -AD Pt will be able to participate in re-evaluation of swallowing as mental status improves in order to make determinationfor advancement to least restrictive po diet in 2 days.  -AD Pt will be able to participate in re-evaluation of swallowing as mental status improves in order to make determinationfor advancement to least restrictive po diet in 2 days.  -AD    [place] (Swallow Long Term Goal) with 1:1 assist/ supervision  -AD with 1:1 assist/ supervision  -AD with 1:1 assist/ supervision  -AD    Time Frame (Swallow Long Term Goal) by discharge  -AD by discharge  -AD --  2 days  -AD    Barriers (Swallow Long Term Goal) medically complex  -AD medically complex  -AD encephalopathy  -AD    Progress/Outcomes (Swallow Long Term Goal) goal met  -AD good progress toward goal;goal ongoing  -AD new goal  -AD    Comment (Swallow Long Term Goal)  -- Re-eval completed. See Clinical Swallow Summary  -AD --       (STG) Patient will tolerate trials of    Consistencies Trialed (Tolerate trials) soft to chew (whole) textures;soft to chew (chopped) textures;soft to chew (ground) textures  -AD -- --    Desired Outcome (Tolerate trials) without signs of distress;with adequate oral prep/transit/clearance  -AD -- --    [place] (Tolerate trials) independently (over 90% accuracy)  -AD -- --    Time Frame (Tolerate trials) 1 week  -AD -- --    Progress/Outcomes (Tolerate trials) new goal  -AD -- --    Row Name 12/13/22 0800             (LTG) Swallow    (LTG) Swallow Pt will be able to participate in re-evaluation of swallowing as mental status improves in order to make determinationfor advancement to po diet at least restrictive  -AD            User Key  (r) = Recorded By, (t) = Taken By, (c) = Cosigned By    Initials Name Provider Type    Crissy Vargas MS CCC-SLP Speech and Language Pathologist                   Time Calculation:    Time Calculation- SLP     Row Name 12/15/22 1552 12/15/22 1044          Time Calculation- SLP    SLP Start Time 1505  -AD 0830  -AD     SLP Stop Time 1537  -AD 0900  -AD     SLP Time Calculation (min) 32 min  -AD 30 min  -AD     Total Timed Code Minutes- SLP 0 minute(s)  -AD 0 minute(s)  -AD     SLP Non-Billable Time (min) 0 min  -AD 0 min  -AD     SLP Received On 12/15/22  -AD 12/15/22  -AD        Untimed Charges    50155-NF Eval Oral Pharyng Swallow Minutes -- 30  -AD     [id number] Treatment Swallow Minutes 32  -AD --        Total Minutes    Untimed Charges Total Minutes 32  -AD 30  -AD      Total Minutes 32  -AD 30  -AD           User Key  (r) = Recorded By, (t) = Taken By, (c) = Cosigned By    Initials Name Provider Type    Crissy Vargas MS CCC-SLP Speech and Language Pathologist                Therapy Charges for Today     Code Description Service Date Service Provider Modifiers Qty    [id number]  ST EVAL ORAL  PHARYNG SWALLOW 2 12/15/2022 Crissy Parrish, MS CCC-SLP GN 1    [id number]  ST TREATMENT SWALLOW 2 12/15/2022 Crissy Parrish, MS CCC-SLP GN 1               Crissy Parrish, MS MARIANO-SLP  12/15/2022

## 2022-12-15 NOTE — PLAN OF CARE
Goal Outcome Evaluation:  Plan of Care Reviewed With: patient, other (see comments) (RN, Nany; MD, Dr. Carter)           Outcome Evaluation: SLP: Re-eval of swallowing. Pt with improved alertness and ability to participate. Trialed on thins and puree at this time. One episode of coughing on thins from cup, but coughed up a large greyish/tan mucous ball. Strong cough noted. No other coughing on repeated trials of thins but occasional throat clear. Tolerates puree without difficulty. Impressions: R/o pharyngeal dysphagia. Will see again later this morning for further assessment. Concerns for variation in cognitive status/easily fatigues. Recommend: okay for meds in applesauce and ice chips/sips of water after oral care. Continued assessment for advancement of diet.

## 2022-12-15 NOTE — PROGRESS NOTES
GI Daily Progress Note    Assessment/Plan:      Upper GI bleed    Metabolic encephalopathy    Hyperammonemia (HCC)    Chronic liver failure without hepatic coma (HCC)       LOS: 9 days     Lionel Whitehead is a 77 y.o. male who was admitted with Upper GI bleed. He reports the symptoms are unchanged. Hd several lucid hours this AM but Is sleeping now, awoken by voice but will fall off spontaneously.     Subjective:    Patient expresses Perineal skin issues per wife and nursing staff  Patient denies not awake this afternoon    Objective:    Vital signs in last 24 hours:  Temp:  [97.3 °F (36.3 °C)-98.4 °F (36.9 °C)] 97.3 °F (36.3 °C)  Heart Rate:  [82-98] 92  Resp:  [14-20] 18  BP: (123-153)/(72-86) 131/86    Intake/Output last 3 shifts:  I/O last 3 completed shifts:  In: 5276 [I.V.:530; Other:3160; NG/GT:1586]  Out: 1680 [Urine:1680]  Intake/Output this shift:  I/O this shift:  In: -   Out: 725 [Urine:725]    Physical Exam:Abdomen  Sounds Normal Active Bowel Sounds   Distension Soft   Tenderness Nontender     Imaging Results (Last 72 Hours)     Procedure Component Value Units Date/Time    XR Abdomen KUB [[id number]] Collected: 12/14/22 2204     Updated: 12/14/22 2206    Narrative:      CR Abdomen 1 Vw    INDICATION:   Feeding tube placement.    COMPARISON:   12/11/2022    FINDINGS:  AP radiograph(s) of the abdomen. Flexible feeding tube forms a loop in the body of the stomach with the tip directed toward the gastric fundus. Visualized bowel gas pattern is normal.      Impression:      Feeding tube loops in the body of the stomach with the tip directed toward the fundus of the stomach.    Signer Name: Augie Juan MD   Signed: 12/14/2022 10:04 PM   Workstation Name: [id number]    [facility]    US Guided Liver Biopsy [[id number]] Collected: 12/14/22 1249     Updated: 12/14/22 1418    Narrative:      EXAMINATION:  Ultrasound-guided liver biopsy, 12/14/2022     HISTORY:   77-year-old male  hospitalized with abnormal liver function testing,  liver disease and ascites. CT of abdomen demonstrated a cirrhotic liver  with innumerable hypodense liver lesions suspicious for hepatocellular  carcinoma. Ultrasound guided liver biopsy was requested for diagnostic  purposes.      CONSENT:   The procedure, risks and alternatives were discussed in detail with the  patient's wife and written informed consent was obtained. Timeout was  observed in the procedure room for patient identification and procedure  site verification, and the procedure site was marked by me. Permanent  images were recorded.     STERILE TECHNIQUE: Maximal sterile barrier precautions were utilized  during the procedure, including but not limited to: Hand hygiene; use of  chlorhexidine aseptic; sterile gloves, sterile drape/towels, sterile  ultrasound probe cover and sterile ultrasound gel.     PROCEDURE:   Utilizing an anterior approach and ultrasound guidance, 1% buffered  lidocaine utilized for local anesthesia was administered subcutaneously  and to the level of the liver capsule.     Under concurrent real-time ultrasound visualization, a 17-gauge needle  guide was advanced just proximal to an area of diffuse masslike  infiltration in the medial left hepatic lobe with permanent ultrasound  image documentation. An 18-gauge /10cm [employer] core biopsy needle was  utilized and captured a core of the liver within the abnormal area of  interest. Permanent ultrasound image demonstrating the 2.2 cm throw to  be within the area of interest within the liver. 3 core specimens were  obtained. The cores were placed in formalin. Upon removal of the needle  guide, a Gelfoam slurry was instilled to embolize the biopsy tract.   Hemostasis achieved. A band-aid was applied. The patient's vital signs  remained stable throughout the procedure.       Impression:      Technically successful ultrasound-guided liver biopsy.     This report was finalized on  12/14/2022 2:16 PM by Dr. Dion Goddard MD.       US Paracentesis [[id number]] Collected: 12/13/22 1534    Specimen: Body Fluid Updated: 12/13/22 1538    Narrative:      ULTRASOUND-GUIDED PARACENTESIS, 12/13/2022     HISTORY:   77-year-old male hospital inpatient with abnormal liver function  testing, liver disease, ascites.     CONSENT:   The procedure, risks and alternatives were discussed in detail with the  patient, questions were entertained, and written informed consent was  obtained. Timeout was observed in the procedure room for patient  identification and procedure site verification, and the procedure site  was marked by me. Permanent images were recorded.     PROCEDURE:   Preprocedure ultrasound imaging shows small-to-moderate volumes of  ascites fluid scattered in multiple pockets in the upper and lower  abdomen. The largest single collection in the right lower quadrant was  targeted for the procedure.     Using sterile technique, 1% lidocaine local anesthetic and real-time  ultrasound guidance, a 5 Lebanese paracentesis catheter was placed into  the largest identified portion of ascites fluid in the right lower  quadrant, and 4.8 L of straw-colored ascites fluid was removed without  apparent complication.       Impression:      Technically successful ultrasound-guided paracentesis with removal of  4.8 L ascites fluid.     This report was finalized on 12/13/2022 3:36 PM by Dr. Sb Mak MD.       SCANNED - IMAGING [[id number]] Resulted: 12/05/22     Updated: 12/13/22 1322    CT Abdomen With & Without Contrast [[id number]] Collected: 12/13/22 1314     Updated: 12/13/22 1316    Narrative:      CT Abdomen WO W    INDICATION:   Elevated alkaline phosphatase gastrointestinal hemorrhage unspecified.    TECHNIQUE:   CT of the abdomen with and without IV contrast. Coronal and sagittal reconstructions were obtained.  Radiation dose reduction techniques included automated exposure control or exposure modulation  based on body size. Count of known CT and cardiac nuc med  studies performed in previous 12 months: 0.     COMPARISON:   None available.    FINDINGS:  Infiltrate seen in the right lung base with minimal emphysematous change. Respiratory motion artifact present.    Moderate nodular morphology of the liver with heterogeneous enhancement present in patient with cirrhosis. Parenchyma is markedly abnormal. No gross arterial enhancement seen. However the vague areas of abnormal low density on portal venous phase could  be cirrhoto- mimetic HCC variant. Correlate with alpha-fetoprotein. The posterior branch of the right portal vein is occluded also concerning for underlying malignancy. There is also thrombosis of the main portal vein is nonocclusive. Spleen is enlarged  at 14.3 cm.    Mild volume ascites present. Gallbladder full of stones. Mild wall thickening of the jejunum seen but this could be underdistention artifact. There is also thrombosis in the portal confluence and SMV. This could account for the changes in the small  bowel.    GI track not imaged as pelvic CT not requested. There is a catheter within the stomach, coiled in the body. Stomach is not adequately distended to evaluate. Involuntary respiratory motion artifact and streak artifact limiting evaluation.    There is a large right renal pelvic 10 mm stone without delay in enhancement of the right kidney and does not likely obstructed. Moderate atherosclerotic plaque seen in the aorta with infrarenal abdominal aortic aneurysm measuring 3.3 x 3.6 cm.      Impression:        1. 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 Splenomegaly present.  2. There is also suggestion of partial thrombosis of the portal confluence and SMV and could be related to the jejunal wall thickening.  3. Cholelithiasis.  4. Nonobstructing right renal calculus.  5. Significant  infiltrate right lung base  6. Infrarenal abdominal aortic aneurysm. Recommend yearly surveillance.    Signer Name: Cherry Chatterjee MD   Signed: 12/13/2022 1:14 PM   Workstation Name: [email or web address]    [facility]          WBC   Date Value Ref Range Status   12/15/2022 12.81 (H) 3.40 - 10.80 10*3/mm3 Final     RBC   Date Value Ref Range Status   12/15/2022 3.46 (L) 4.14 - 5.80 10*6/mm3 Final     Hemoglobin   Date Value Ref Range Status   12/15/2022 10.4 (L) 13.0 - 17.7 g/dL Final     Hematocrit   Date Value Ref Range Status   12/15/2022 35.6 (L) 37.5 - 51.0 % Final     MCV   Date Value Ref Range Status   12/15/2022 102.9 (H) 79.0 - 97.0 fL Final     MCH   Date Value Ref Range Status   12/15/2022 30.1 26.6 - 33.0 pg Final     MCHC   Date Value Ref Range Status   12/15/2022 29.2 (L) 31.5 - 35.7 g/dL Final     RDW   Date Value Ref Range Status   12/15/2022 18.3 (H) 12.3 - 15.4 % Final     RDW-SD   Date Value Ref Range Status   12/15/2022 65.6 (H) 37.0 - 54.0 fl Final     MPV   Date Value Ref Range Status   12/15/2022 14.2 (H) 6.0 - 12.0 fL Final     Platelets   Date Value Ref Range Status   12/15/2022 79 (L) 140 - 450 10*3/mm3 Final     Neutrophil %   Date Value Ref Range Status   12/15/2022 81.4 (H) 42.7 - 76.0 % Final     Lymphocyte %   Date Value Ref Range Status   12/15/2022 5.9 (L) 19.6 - 45.3 % Final     Monocyte %   Date Value Ref Range Status   12/15/2022 8.4 5.0 - 12.0 % Final     Eosinophil %   Date Value Ref Range Status   12/15/2022 1.4 0.3 - 6.2 % Final     Basophil %   Date Value Ref Range Status   12/15/2022 0.1 0.0 - 1.5 % Final     Immature Grans %   Date Value Ref Range Status   12/15/2022 2.8 (H) 0.0 - 0.5 % Final     Neutrophils, Absolute   Date Value Ref Range Status   12/15/2022 10.44 (H) 1.70 - 7.00 10*3/mm3 Final     Lymphocytes, Absolute   Date Value Ref Range Status   12/15/2022 0.75 0.70 - 3.10 10*3/mm3 Final     Monocytes, Absolute   Date Value Ref Range Status    12/15/2022 1.07 (H) 0.10 - 0.90 10*3/mm3 Final     Eosinophils, Absolute   Date Value Ref Range Status   12/15/2022 0.18 0.00 - 0.40 10*3/mm3 Final     Basophils, Absolute   Date Value Ref Range Status   12/15/2022 0.01 0.00 - 0.20 10*3/mm3 Final     Immature Grans, Absolute   Date Value Ref Range Status   12/15/2022 0.36 (H) 0.00 - 0.05 10*3/mm3 Final     nRBC   Date Value Ref Range Status   12/14/2022 0.4 (H) 0.0 - 0.2 /100 WBC Final       Glucose   Date Value Ref Range Status   12/15/2022 136 (H) 65 - 99 mg/dL Final     Sodium   Date Value Ref Range Status   12/15/2022 150 (H) 136 - 145 mmol/L Final     Potassium   Date Value Ref Range Status   12/15/2022 4.0 3.5 - 5.2 mmol/L Final     CO2   Date Value Ref Range Status   12/15/2022 19.7 (L) 22.0 - 29.0 mmol/L Final     Chloride   Date Value Ref Range Status   12/15/2022 119 (H) 98 - 107 mmol/L Final     Anion Gap   Date Value Ref Range Status   12/15/2022 11.3 5.0 - 15.0 mmol/L Final     Creatinine   Date Value Ref Range Status   12/15/2022 1.12 0.76 - 1.27 mg/dL Final     BUN   Date Value Ref Range Status   12/15/2022 48 (H) 8 - 23 mg/dL Final     BUN/Creatinine Ratio   Date Value Ref Range Status   12/15/2022 42.9 (H) 7.0 - 25.0 Final     Calcium   Date Value Ref Range Status   12/15/2022 8.3 (L) 8.6 - 10.5 mg/dL Final     Alkaline Phosphatase   Date Value Ref Range Status   12/15/2022 251 (H) 39 - 117 U/L Final     Total Protein   Date Value Ref Range Status   12/15/2022 5.8 (L) 6.0 - 8.5 g/dL Final     ALT (SGPT)   Date Value Ref Range Status   12/15/2022 204 (H) 1 - 41 U/L Final     AST (SGOT)   Date Value Ref Range Status   12/15/2022 25 1 - 40 U/L Final     Total Bilirubin   Date Value Ref Range Status   12/15/2022 3.1 (H) 0.0 - 1.2 mg/dL Final     Albumin   Date Value Ref Range Status   12/15/2022 2.80 (L) 3.50 - 5.20 g/dL Final     Globulin   Date Value Ref Range Status   12/15/2022 3.0 gm/dL Final     HE  Variceal Bleed S/P EGD w banding 12/6  Shock  liver  Alcoholic Cirrhosis  Thrombocytopenia  Elevated AFP  Ascites    Path should be back tomorrow and will have Oncology voice an opinion but wife was counseled tht his functional status precudes any significant chemo so a palliative approach is most likely the recommendation.

## 2022-12-15 NOTE — PROGRESS NOTES
GI Daily Progress Note    Assessment/Plan:      Upper GI bleed    Metabolic encephalopathy    Hyperammonemia (HCC)    Chronic liver failure without hepatic coma (HCC)       LOS: 8 days     Lionel Whitehead is a 77 y.o. male who was admitted with Upper GI bleed. He reports the symptoms are improving with treatment. More lert and answered appropriately, keeping NGT and IVs in place. Doesn't recall the liver biopsy earlier today and couldn't tell me if he had visitors.    Subjective:    Patient expresses Hurts all over  Patient denies vomiting and bloody stools    Objective:    Vital signs in last 24 hours:  Temp:  [97.4 °F (36.3 °C)-98 °F (36.7 °C)] 97.4 °F (36.3 °C)  Heart Rate:  [79-98] 92  Resp:  [16-24] 18  BP: (125-155)/(62-84) 126/72    Intake/Output last 3 shifts:  I/O last 3 completed shifts:  In: 8171 [I.V.:920; Other:4760; NG/GT:2491]  Out: 2005 [Urine:2005]  Intake/Output this shift:  No intake/output data recorded.    Physical Exam:Abdomen  Sounds Normal Active Bowel Sounds   Distension Soft and Distended   Tenderness Mildly Tender     Imaging Results (Last 72 Hours)     Procedure Component Value Units Date/Time    US Guided Liver Biopsy [[id number]] Collected: 12/14/22 1249     Updated: 12/14/22 1418    Narrative:      EXAMINATION:  Ultrasound-guided liver biopsy, 12/14/2022     HISTORY:   77-year-old male hospitalized with abnormal liver function testing,  liver disease and ascites. CT of abdomen demonstrated a cirrhotic liver  with innumerable hypodense liver lesions suspicious for hepatocellular  carcinoma. Ultrasound guided liver biopsy was requested for diagnostic  purposes.      CONSENT:   The procedure, risks and alternatives were discussed in detail with the  patient's wife and written informed consent was obtained. Timeout was  observed in the procedure room for patient identification and procedure  site verification, and the procedure site was marked by me. Permanent  images were recorded.      STERILE TECHNIQUE: Maximal sterile barrier precautions were utilized  during the procedure, including but not limited to: Hand hygiene; use of  chlorhexidine aseptic; sterile gloves, sterile drape/towels, sterile  ultrasound probe cover and sterile ultrasound gel.     PROCEDURE:   Utilizing an anterior approach and ultrasound guidance, 1% buffered  lidocaine utilized for local anesthesia was administered subcutaneously  and to the level of the liver capsule.     Under concurrent real-time ultrasound visualization, a 17-gauge needle  guide was advanced just proximal to an area of diffuse masslike  infiltration in the medial left hepatic lobe with permanent ultrasound  image documentation. An 18-gauge /10cm [employer] core biopsy needle was  utilized and captured a core of the liver within the abnormal area of  interest. Permanent ultrasound image demonstrating the 2.2 cm throw to  be within the area of interest within the liver. 3 core specimens were  obtained. The cores were placed in formalin. Upon removal of the needle  guide, a Gelfoam slurry was instilled to embolize the biopsy tract.   Hemostasis achieved. A band-aid was applied. The patient's vital signs  remained stable throughout the procedure.       Impression:      Technically successful ultrasound-guided liver biopsy.     This report was finalized on 12/14/2022 2:16 PM by Dr. Dion Goddard MD.        Paracentesis [[id number]] Collected: 12/13/22 1534    Specimen: Body Fluid Updated: 12/13/22 1538    Narrative:      ULTRASOUND-GUIDED PARACENTESIS, 12/13/2022     HISTORY:   77-year-old male hospital inpatient with abnormal liver function  testing, liver disease, ascites.     CONSENT:   The procedure, risks and alternatives were discussed in detail with the  patient, questions were entertained, and written informed consent was  obtained. Timeout was observed in the procedure room for patient  identification and procedure site verification, and the procedure  site  was marked by me. Permanent images were recorded.     PROCEDURE:   Preprocedure ultrasound imaging shows small-to-moderate volumes of  ascites fluid scattered in multiple pockets in the upper and lower  abdomen. The largest single collection in the right lower quadrant was  targeted for the procedure.     Using sterile technique, 1% lidocaine local anesthetic and real-time  ultrasound guidance, a 5 Romanian paracentesis catheter was placed into  the largest identified portion of ascites fluid in the right lower  quadrant, and 4.8 L of straw-colored ascites fluid was removed without  apparent complication.       Impression:      Technically successful ultrasound-guided paracentesis with removal of  4.8 L ascites fluid.     This report was finalized on 12/13/2022 3:36 PM by Dr. Sb Mak MD.       SCANNED - IMAGING [[id number]] Resulted: 12/05/22     Updated: 12/13/22 1322    CT Abdomen With & Without Contrast [[id number]] Collected: 12/13/22 1314     Updated: 12/13/22 1316    Narrative:      CT Abdomen WO W    INDICATION:   Elevated alkaline phosphatase gastrointestinal hemorrhage unspecified.    TECHNIQUE:   CT of the abdomen with and without IV contrast. Coronal and sagittal reconstructions were obtained.  Radiation dose reduction techniques included automated exposure control or exposure modulation based on body size. Count of known CT and cardiac nuc med  studies performed in previous 12 months: 0.     COMPARISON:   None available.    FINDINGS:  Infiltrate seen in the right lung base with minimal emphysematous change. Respiratory motion artifact present.    Moderate nodular morphology of the liver with heterogeneous enhancement present in patient with cirrhosis. Parenchyma is markedly abnormal. No gross arterial enhancement seen. However the vague areas of abnormal low density on portal venous phase could  be cirrhoto- mimetic HCC variant. Correlate with alpha-fetoprotein. The posterior branch of the  right portal vein is occluded also concerning for underlying malignancy. There is also thrombosis of the main portal vein is nonocclusive. Spleen is enlarged  at 14.3 cm.    Mild volume ascites present. Gallbladder full of stones. Mild wall thickening of the jejunum seen but this could be underdistention artifact. There is also thrombosis in the portal confluence and SMV. This could account for the changes in the small  bowel.    GI track not imaged as pelvic CT not requested. There is a catheter within the stomach, coiled in the body. Stomach is not adequately distended to evaluate. Involuntary respiratory motion artifact and streak artifact limiting evaluation.    There is a large right renal pelvic 10 mm stone without delay in enhancement of the right kidney and does not likely obstructed. Moderate atherosclerotic plaque seen in the aorta with infrarenal abdominal aortic aneurysm measuring 3.3 x 3.6 cm.      Impression:        1. 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 Splenomegaly present.  2. There is also suggestion of partial thrombosis of the portal confluence and SMV and could be related to the jejunal wall thickening.  3. Cholelithiasis.  4. Nonobstructing right renal calculus.  5. Significant infiltrate right lung base  6. Infrarenal abdominal aortic aneurysm. Recommend yearly surveillance.    Signer Name: Cherry Chatterjee MD   Signed: 12/13/2022 1:14 PM   Workstation Name: [facility]    [facility]    XR Abdomen KUB [[id number]] Collected: 12/11/22 2302     Updated: 12/11/22 2305    Narrative:      CR Abdomen 1 Vw    INDICATION:   Tube placement.    COMPARISON:   12/8/2022    FINDINGS:  AP radiograph(s) of the abdomen. NG tube has been removed. A feeding tube is looped in the proximal stomach. Visualized bowel gas pattern is normal.      Impression:      Feeding tube looped in  the proximal stomach. Consider advancing the tube.    Signer Name: Augie Juan MD   Signed: 12/11/2022 11:02 PM   Workstation Name: [id number]    [facility]          WBC   Date Value Ref Range Status   12/14/2022 9.47 3.40 - 10.80 10*3/mm3 Final     RBC   Date Value Ref Range Status   12/14/2022 3.37 (L) 4.14 - 5.80 10*6/mm3 Final     Hemoglobin   Date Value Ref Range Status   12/14/2022 10.4 (L) 13.0 - 17.7 g/dL Final     Hematocrit   Date Value Ref Range Status   12/14/2022 34.7 (L) 37.5 - 51.0 % Final     MCV   Date Value Ref Range Status   12/14/2022 103.0 (H) 79.0 - 97.0 fL Final     MCH   Date Value Ref Range Status   12/14/2022 30.9 26.6 - 33.0 pg Final     MCHC   Date Value Ref Range Status   12/14/2022 30.0 (L) 31.5 - 35.7 g/dL Final     RDW   Date Value Ref Range Status   12/14/2022 18.4 (H) 12.3 - 15.4 % Final     RDW-SD   Date Value Ref Range Status   12/14/2022 66.3 (H) 37.0 - 54.0 fl Final     MPV   Date Value Ref Range Status   12/14/2022 13.5 (H) 6.0 - 12.0 fL Final     Platelets   Date Value Ref Range Status   12/14/2022 62 (L) 140 - 450 10*3/mm3 Final     Neutrophil %   Date Value Ref Range Status   12/14/2022 83.8 (H) 42.7 - 76.0 % Final     Lymphocyte %   Date Value Ref Range Status   12/14/2022 6.8 (L) 19.6 - 45.3 % Final     Monocyte %   Date Value Ref Range Status   12/14/2022 5.8 5.0 - 12.0 % Final     Eosinophil %   Date Value Ref Range Status   12/14/2022 1.2 0.3 - 6.2 % Final     Basophil %   Date Value Ref Range Status   12/14/2022 0.3 0.0 - 1.5 % Final     Immature Grans %   Date Value Ref Range Status   12/14/2022 2.1 (H) 0.0 - 0.5 % Final     Neutrophils, Absolute   Date Value Ref Range Status   12/14/2022 7.94 (H) 1.70 - 7.00 10*3/mm3 Final     Lymphocytes, Absolute   Date Value Ref Range Status   12/14/2022 0.64 (L) 0.70 - 3.10 10*3/mm3 Final     Monocytes, Absolute   Date Value Ref Range Status   12/14/2022 0.55 0.10 - 0.90 10*3/mm3 Final      Eosinophils, Absolute   Date Value Ref Range Status   12/14/2022 0.11 0.00 - 0.40 10*3/mm3 Final     Basophils, Absolute   Date Value Ref Range Status   12/14/2022 0.03 0.00 - 0.20 10*3/mm3 Final     Immature Grans, Absolute   Date Value Ref Range Status   12/14/2022 0.20 (H) 0.00 - 0.05 10*3/mm3 Final     nRBC   Date Value Ref Range Status   12/14/2022 0.4 (H) 0.0 - 0.2 /100 WBC Final       Glucose   Date Value Ref Range Status   12/14/2022 192 (H) 65 - 99 mg/dL Final     Sodium   Date Value Ref Range Status   12/14/2022 149 (H) 136 - 145 mmol/L Final     Potassium   Date Value Ref Range Status   12/14/2022 4.3 3.5 - 5.2 mmol/L Final     CO2   Date Value Ref Range Status   12/14/2022 19.4 (L) 22.0 - 29.0 mmol/L Final     Chloride   Date Value Ref Range Status   12/14/2022 120 (H) 98 - 107 mmol/L Final     Anion Gap   Date Value Ref Range Status   12/14/2022 9.6 5.0 - 15.0 mmol/L Final     Creatinine   Date Value Ref Range Status   12/14/2022 0.97 0.76 - 1.27 mg/dL Final     BUN   Date Value Ref Range Status   12/14/2022 43 (H) 8 - 23 mg/dL Final     BUN/Creatinine Ratio   Date Value Ref Range Status   12/14/2022 44.3 (H) 7.0 - 25.0 Final     Calcium   Date Value Ref Range Status   12/14/2022 8.1 (L) 8.6 - 10.5 mg/dL Final     Alkaline Phosphatase   Date Value Ref Range Status   12/14/2022 262 (H) 39 - 117 U/L Final     Total Protein   Date Value Ref Range Status   12/14/2022 5.6 (L) 6.0 - 8.5 g/dL Final     ALT (SGPT)   Date Value Ref Range Status   12/14/2022 327 (H) 1 - 41 U/L Final     AST (SGOT)   Date Value Ref Range Status   12/14/2022 25 1 - 40 U/L Final     Total Bilirubin   Date Value Ref Range Status   12/14/2022 2.7 (H) 0.0 - 1.2 mg/dL Final     Albumin   Date Value Ref Range Status   12/14/2022 3.10 (L) 3.50 - 5.20 g/dL Final     Globulin   Date Value Ref Range Status   12/14/2022 2.5 gm/dL Final     HE  Variceal Bleed S/P EGD w banding 12/6  Shock liver  Alcoholic  Cirrhosis  Thrombocytopenia  Elevated AFP  Ascites    Will have Speech therapy see for resuming PO intake, will sto the IV continuous and begin lasix to help with the fluid his BMP is stable and his CBC is as well.Discussed all as wel as timing of the biopsy result with his wife

## 2022-12-15 NOTE — THERAPY RE-EVALUATION
Acute Care - Speech Language Pathology   Swallow Re-Evaluation  [place]     Patient Name: Lionel Whitehead  : 1945  MRN: [id number]  Today's Date: 12/15/2022               Admit Date: 2022    Visit Dx:     ICD-10-CM ICD-9-CM   1. Upper GI bleed  K92.2 578.9     Patient Active Problem List   Diagnosis   • Upper GI bleed   • Metabolic encephalopathy   • Hyperammonemia (HCC)   • Chronic liver failure without hepatic coma (HCC)     Past Medical History:   Diagnosis Date   • History of esophageal varices    • Restless leg syndrome      Past Surgical History:   Procedure Laterality Date   • ENDOSCOPY     • ENDOSCOPY W/ BANDING N/A 2022    Procedure: ESOPHAGOGASTRODUODENOSCOPY WITH BANDING;  Surgeon: Son Saleem MD;  Location: [facility];  Service: Gastroenterology;  Laterality: N/A;  varices       SLP Recommendation and Plan  SLP Swallowing Diagnosis: functional oral phase, R/O pharyngeal dysphagia (12/15/22 0900)  SLP Diet Recommendation: ice chips between meals after oral care, with supervision, other (see comments), temporary alternate methods of nutrition/hydration (meds whole in applesauce) (12/15/22 0900)  Recommended Precautions and Strategies: upright posture during/after eating, small bites of food and sips of liquid, general aspiration precautions, reflux precautions, fatigue precautions, 1:1 supervision (12/15/22 0900)  SLP Rec. for Method of Medication Administration: meds whole, with puree, as tolerated (12/15/22 0900)     Monitor for Signs of Aspiration: notify SLP if any concerns (12/15/22 0900)  Recommended Diagnostics: reassess via clinical swallow evaluation (12/15/22 0900)     Anticipated Discharge Disposition (SLP): unknown (12/15/22 0900)  Rehab Potential/Prognosis, Swallowing: re-evaluate goals as necessary (12/15/22 0900)                        Patient/Family Concerns, Anticipated Discharge Disposition (SLP): No concerns reported per pt at this time. (12/15/22  0900)                     Plan of Care Reviewed With: patient, other (see comments) (RN, Nany; MD, Dr. Carter)  Outcome Evaluation: SLP: Re-eval of swallowing. Pt with improved alertness and ability to participate. Trialed on thins and puree at this time. One episode of coughing on thins from cup, but coughed up a large greyish/tan mucous ball. Strong cough noted. No other coughing on repeated trials of thins but occasional throat clear. Tolerates puree without difficulty. Impressions: R/o pharyngeal dysphagia. Will see again later this morning for further assessment. Concerns for variation in cognitive status/easily fatigues. Recommend: okay for meds in applesauce and ice chips/sips of water after oral care. Continued assessment for advancement of diet.      SWALLOW EVALUATION (last 72 hours)     SLP Adult Swallow Evaluation     Row Name 12/15/22 0900 12/14/22 1105 12/13/22 0900 12/13/22 0800 12/12/22 1150       Rehab Evaluation    Document Type re-evaluation  -AD -- -- evaluation  -AD other (see comments)  -AD    Subjective Information no complaints  -AD -- -- --  Pt not verbally responding.  -AD --    Patient Observations alert;cooperative  -AD -- -- lethargic  varied alertness  -AD --    Patient/Family/Caregiver Comments/Observations Pt was seen upright in bed. Assisted with positioning after getting pt cleaned up. Pt with significant improvement in alertness and cooperativeness.  -AD -- -- Pt seen in bed, upright near 90 degrees. Pt able to open his eyes and nods yes/no to personal questions. He does not respond verbally. Opens nad closes eyes periodically. He does follow simple commands for opening his mouth and closing around swab. Limited following commands overall and limited effort overall due to current cognitive status.  -AD Pt was seen at bedside for attempted swallow eval with wife present. Mental status fluctuates Able to follow some simple, 1-step commands, but not consistently. Mouth is very dry  and dried secretions noted in oral cavity on tongue and palate, however, pt somewhat resistant with more aggressive oral care . Able to tolerate swab with slight moisture. Startles at times in response to readmission of swab. Difficulty getting pt to attempt to swallow but eventually does after significant delay. Pt demonstrating confusion and telling his wife he is ready to go home. Pt continues to not be ready to fully participate in swallow evaluation or for safe po intake at this time. Will continue to follow and evaluate as able. Wife and RN, Ramakrishna, understanding. Recommend to continue NPO status.  -AD    Session Not Performed -- patient unavailable for treatment  -AD -- -- --    Evaluation Not Performed, Comment -- out for biopsy  -AD -- -- --    Patient Effort good  -AD -- -- fair  -AD --    Comment -- -- -- Limitations due to current medical status/confusion.  -AD --    Symptoms Noted During/After Treatment none;other (see comments)  -AD -- -- fatigue;other (see comments)  varied cognitive status  -AD --    Symptoms Noted, Comment continues with mild weakness/fatigue, but cooperative and maintains alertness throughout  -AD -- -- -- --       General Information    Patient Profile Reviewed yes  -AD -- -- yes  -AD --    Pertinent History Of Current Problem Pt w/change in medical status with concern for hepatocellular carcinoma based on liver biopsy.  -AD -- -- Pt is a 78 y/o male referred for a swallow eval following hepatic encephalopathy. Admitted for UGI bleed and s/p EGD with banding. Diagnosed with esophageal varices, gastric varices. Swallow eval has been attempted since 12/8/22 w/o success due to mental status changes.  -AD --    Current Method of Nutrition NPO;nasogastric feedings;small-bore  -AD -- -- NPO  -AD --    Precautions/Limitations, Vision WFL;for purposes of eval  -AD -- -- other (see comments)  FAY  -AD --    Precautions/Limitations, Hearing WFL;for purposes of eval  -AD -- -- other (see  comments)  FAY, does respond to his name when called  -AD --    Prior Level of Function-Communication other (see comments)  metabolic encephalopathy improving  -AD -- -- cognitive-linguistic impairment;other (see comments)  difficult to assess due to current encephalopathy.  -AD --    Prior Level of Function-Swallowing no diet consistency restrictions;regular textures;thin liquids  -AD -- -- no diet consistency restrictions;regular textures;thin liquids  per spouse  -AD --    Plans/Goals Discussed with patient;agreed upon  -AD -- -- other (see comments)  Yesica ROMAN  -AD --    Barriers to Rehab medically complex  -AD -- -- medically complex  -AD --    Patient's Goals for Discharge return home;return to PO diet  -AD -- -- patient could not state  -AD --    Family Goals for Discharge -- -- -- patient able to return to PO diet  -AD --       Pain    Additional Documentation --  no jpain reported  -AD -- -- --  no pain reported or indicated  -AD --       Oral Motor Structure and Function    Oral Lesions or Structural Abnormalities and/or variants none noted, does have coating on tongue surface feel may be dried secretions  -AD -- -- none noted  -AD --    Dentition Assessment natural, present and adequate;other (see comments)  -AD -- -- natural, present and adequate;other (see comments)  difficult to fully assess due to limited following commands and mouth opening.  -AD --    Secretion Management dried secretions in oral cavity  tongue surface  -AD -- -- dried secretions in oral cavity  significant dry oral cavity  -AD --    Mucosal Quality dry  -AD -- -- dry  -AD --    Volitional Swallow delayed  -AD -- -- unable to elicit  -AD --    Volitional Cough unable to elicit  functional involuntary cough  -AD -- -- unable to elicit  -AD --       Oral Musculature and Cranial Nerve Assessment    Oral Motor General Assessment generalized oral motor weakness  -AD -- -- unable to assess  -AD --    Oral Motor, Comment -- -- -- Unable to  assess due to mental status  -AD --       General Eating/Swallowing Observations    Respiratory Support Currently in Use room air  -AD -- -- room air  -AD --    Eating/Swallowing Skills self-fed;fed by SLP;other (see comments)  Pt controlled cup drinks.  -AD -- -- -- --    Positioning During Eating upright 90 degree;upright in bed  -AD -- -- upright in bed  near 90 degrees  -AD --    Utensils Used spoon;cup  -AD -- -- other (see comments)  swab; attempted spoon 1x  -AD --    Consistencies Trialed pureed;thin liquids  -AD -- -- thin liquids  -AD --    Pre SpO2 (%) 98  -AD -- -- -- --    Post SpO2 (%) 98  -AD -- -- -- --       Respiratory    Respiratory Status WFL;room air;during swallowing/eating  -AD -- -- WFL;room air  -AD --       Clinical Swallow Eval    Oral Prep Phase WFL  -AD -- -- impaired  -AD --    Oral Transit WFL  -AD -- -- impaired  -AD --    Oral Residue WFL  -AD -- -- --  unable to assess  -AD --    Pharyngeal Phase suspected pharyngeal impairment  -AD -- -- --  unable to assess  -AD --    Esophageal Phase unremarkable  -AD -- -- --  unable to assess  -AD --    Clinical Swallow Evaluation Summary Pt demonstrates a functional oral swallow. No significant deficits with thins from spoon, cup and applesauce. Pt did exhibit coughing on one trial of water from cup and coughed up a large tanish/greyish mucous ball. No further coughing or choking noted on trials of thins from cup. Did have occasional throat clearing w/further trials of water by cup. Tolerates applesauce w/o any oral or pharyngeal deficits. Pt does demonstrate some fatigue. No esophageal s/s or complaints reported at this time.  -AD -- -- Pt with limited participation due to current mental status. Able to suck on straw, but unable to elicit swallow, in part due to dry oral cavity. Limited trials due to variation in alertness. Attempted 1/3 spoon size of water, but pt not maintaining alertness and ability to take liquid from spoon. Discontinued  attempt and no further po attempts at this time. Recommend continue NPO status w/Dobhoff tube feedings. Will f/u and if no significant change at that time, will discontinue order and recommend to reorder when mental status improves and pt able to fully participate.  -AD --       Oral Prep Concerns    Oral Prep Concerns -- -- -- reduced lip opening;incomplete or weak lip closure around spoon  -AD --    Reduced Lip Opening -- -- -- thin  -AD --    Incomplete or Weak Lip Closure Around Spoon -- -- -- thin  -AD --    Oral Prep Concerns, Comment -- -- -- Pt allows for swab and spoon in mouth, weak closure around both. Poor suck and significant limitation in lingual movement.  -AD --       Oral Transit Concerns    Oral Transit Concerns -- -- -- unable to initiate oral transit;other (see comments)  -AD --    Oral Transit Concerns, Comment -- -- -- Dry oral cavity may be keeping pt from having anything to transit or swallow. Pt unable to take thins from spoon due to weak closure and fluctuations in mental status. SLP did not feel pt was safe enough to attempt any other trials at this time.  -AD --       Pharyngeal Phase Concerns    Pharyngeal Phase Concerns cough;throat clear  -AD -- -- -- --    Cough thin  from single cup drink  -AD -- -- -- --    Throat Clear thin;other (see comments)  inconsistent from cup following choking  -AD -- -- -- --    Pharyngeal Phase Concerns, Comment Pt w/initial trials of thins w/o s/s of aspiration from spoon and cup. One episode of choking and coughing up large mucous ball. Further trials of thins demonstrate inconsistent throat clearing.  -AD -- -- -- --       SLP Evaluation Clinical Impression    SLP Swallowing Diagnosis functional oral phase;R/O pharyngeal dysphagia  -AD -- -- unable to assess  unable to fully assess due to curent mental status.  -AD --    Functional Impact risk of aspiration/pneumonia;risk of malnutrition;risk of dehydration  -AD -- -- risk of aspiration/pneumonia;risk  of malnutrition;risk of dehydration  -AD --    Rehab Potential/Prognosis, Swallowing re-evaluate goals as necessary  -AD -- -- re-evaluate goals as necessary  -AD --       Recommendations    SLP Diet Recommendation ice chips between meals after oral care, with supervision;other (see comments);temporary alternate methods of nutrition/hydration  meds whole in applesauce  -AD -- -- NPO;temporary alternate methods of nutrition/hydration  -AD --    Recommended Diagnostics reassess via clinical swallow evaluation  -AD -- -- reassess via clinical swallow evaluation  -AD --    Recommended Precautions and Strategies upright posture during/after eating;small bites of food and sips of liquid;general aspiration precautions;reflux precautions;fatigue precautions;1:1 supervision  -AD -- -- general aspiration precautions;reflux precautions  -AD --    Oral Care Recommendations Oral Care BID/PRN;Suction toothbrush  -AD -- -- Oral Care BID/PRN;Suction toothbrush  -AD --    SLP Rec. for Method of Medication Administration meds whole;with puree;as tolerated  -AD -- -- meds via alternate route  -AD --    Monitor for Signs of Aspiration notify SLP if any concerns  -AD -- -- notify SLP if any concerns  -AD --    Anticipated Discharge Disposition (SLP) unknown  -AD -- -- unknown  -AD --    Patient/Family Concerns, Anticipated Discharge Disposition (SLP) No concerns reported per pt at this time.  -AD -- -- Pt unable to state at this time  -AD --       Swallow Goals (SLP)    Swallow LTGs -- -- -- Swallow Long Term Goal (free text)  -AD --       (LTG) Swallow    (LTG) Swallow Pt will be able to participate in re-evaluation of swallowing as mental status improves in order to make determinationfor advancement to least restrictive po diet in 2 days.  -AD -- Pt will be able to participate in re-evaluation of swallowing as mental status improves in order to make determinationfor advancement to least restrictive po diet in 2 days.  -AD Pt will be  able to participate in re-evaluation of swallowing as mental status improves in order to make determinationfor advancement to po diet at least restrictive  -AD --    [place] (Swallow Long Term Goal) with 1:1 assist/ supervision  -AD -- with 1:1 assist/ supervision  -AD -- --    Time Frame (Swallow Long Term Goal) by discharge  -AD -- --  2 days  -AD -- --    Barriers (Swallow Long Term Goal) medically complex  -AD -- encephalopathy  -AD -- --    Progress/Outcomes (Swallow Long Term Goal) good progress toward goal;goal ongoing  -AD -- new goal  -AD -- --    Comment (Swallow Long Term Goal) Re-eval completed. See Clinical Swallow Summary  -AD -- -- -- --          User Key  (r) = Recorded By, (t) = Taken By, (c) = Cosigned By    Initials Name Effective Dates    Crissy Vargas MS CCC-SLP 06/16/21 -                 EDUCATION  The patient has been educated in the following areas:   Dysphagia (Swallowing Impairment) NPO rationale. Pt verbalizes understanding. Reinforcement due to current cognitive status.       SLP GOALS     Row Name 12/15/22 0900 12/13/22 0900 12/13/22 0800       (LTG) Swallow    (LTG) Swallow Pt will be able to participate in re-evaluation of swallowing as mental status improves in order to make determinationfor advancement to least restrictive po diet in 2 days.  -AD Pt will be able to participate in re-evaluation of swallowing as mental status improves in order to make determinationfor advancement to least restrictive po diet in 2 days.  -AD Pt will be able to participate in re-evaluation of swallowing as mental status improves in order to make determinationfor advancement to po diet at least restrictive  -AD    [place] (Swallow Long Term Goal) with 1:1 assist/ supervision  -AD with 1:1 assist/ supervision  -AD --    Time Frame (Swallow Long Term Goal) by discharge  -AD --  2 days  -AD --    Barriers (Swallow Long Term Goal) medically complex  -AD encephalopathy  -AD --     Progress/Outcomes (Swallow Long Term Goal) good progress toward goal;goal ongoing  -AD new goal  -AD --    Comment (Swallow Long Term Goal) Re-eval completed. See Clinical Swallow Summary  -AD -- --          User Key  (r) = Recorded By, (t) = Taken By, (c) = Cosigned By    Initials Name Provider Type    Crissy Vargas MS CCC-SLP Speech and Language Pathologist                   Time Calculation:    Time Calculation- SLP     Row Name 12/15/22 1044             Time Calculation- SLP    SLP Start Time 0830  -AD      SLP Stop Time 0900  -AD      SLP Time Calculation (min) 30 min  -AD      Total Timed Code Minutes- SLP 0 minute(s)  -AD      SLP Non-Billable Time (min) 0 min  -AD      SLP Received On 12/15/22  -AD         Untimed Charges    95760-DG Eval Oral Pharyng Swallow Minutes 30  -AD         Total Minutes    Untimed Charges Total Minutes 30  -AD       Total Minutes 30  -AD            User Key  (r) = Recorded By, (t) = Taken By, (c) = Cosigned By    Initials Name Provider Type    Crissy Vargas MS CCC-SLP Speech and Language Pathologist                Therapy Charges for Today     Code Description Service Date Service Provider Modifiers Qty    [id number] HC ST EVAL ORAL PHARYNG SWALLOW 2 12/15/2022 Crissy Parrish MS CCC-SLP GN 1               Crissy Parrish MS CCC-HILARIO  12/15/2022

## 2022-12-16 NOTE — PLAN OF CARE
Goal Outcome Evaluation:  Plan of Care Reviewed With: patient, other (see comments) (RN, Nany; MD, Dr. Carter)           Outcome Evaluation: SLP: Attempted soft, whole texture foods at lunch. Pt with poor appetite and required encouragement to try. Able to take one bite and extended oral prep noted with right pocketing of food. Able to spit out after SLP cue. Did not tolerate and recommend continue with puree diet. Will continue to try to advance diet. May consider, if able, to decrease tube feeding during the day to possible improve appetite. SLP will continue to follow.

## 2022-12-16 NOTE — THERAPY TREATMENT NOTE
Acute Care - Speech Language Pathology   Swallow Treatment Note  Opal Mac     Patient Name: Lionel Whitehead  : 1945  MRN: [id number]  Today's Date: 2022               Admit Date: 2022    Visit Dx:     ICD-10-CM ICD-9-CM   1. Upper GI bleed  K92.2 578.9     Patient Active Problem List   Diagnosis   • Upper GI bleed   • Metabolic encephalopathy   • Hyperammonemia (HCC)   • Chronic liver failure without hepatic coma (HCC)     Past Medical History:   Diagnosis Date   • History of esophageal varices    • Restless leg syndrome      Past Surgical History:   Procedure Laterality Date   • ENDOSCOPY     • ENDOSCOPY W/ BANDING N/A 2022    Procedure: ESOPHAGOGASTRODUODENOSCOPY WITH BANDING;  Surgeon: Son Saleem MD;  Location: [facility];  Service: Gastroenterology;  Laterality: N/A;  varices       SLP Recommendation and Plan     SLP Diet Recommendation: puree, thin liquids, other (see comments) (advance as tolerated to soft texture, whole.) (22)  Recommended Precautions and Strategies: upright posture during/after eating, small bites of food and sips of liquid, alternate between small bites of food and sips of liquid, check mouth frequently for oral residue/pocketing, general aspiration precautions, reflux precautions, fatigue precautions, 1:1 supervision, assist with feeding (22)  SLP Rec. for Method of Medication Administration: meds whole, with puree, as tolerated (22)     Monitor for Signs of Aspiration: notify SLP if any concerns (22)        Anticipated Discharge Disposition (SLP): unknown (22)     Therapy Frequency (Swallow): daily, 3 days per week, 4 days per week, 5 days per week (22)  Predicted Duration Therapy Intervention (Days): 1 week (22)     Swallowing Considerations per Physician Discretion: other (see comments) (consider running tube feeds at night and off during day if feasible to improve  overall appetite) (12/16/22 1210)  Daily Summary of Progress (SLP): progress toward functional goals is gradual (12/16/22 1210)         Patient/Family Concerns, Anticipated Discharge Disposition (SLP): No current concerns reported per his wife. (12/16/22 1210)     Treatment Assessment (SLP): continued, oral dysphagia (12/16/22 1210)  Treatment Assessment Comments (SLP): Pt continues to demonstrate tolerance of puree and thin liquids. Attempted soft to chew, whole consistency egg salad sandwich. Pt only willing to try one bite. Did not tolerate due to prolonged oral prep and pocketing in the right lower sulcus. Unable to clear with liquid wash but able to spit out with verbal prompt to do so. (12/16/22 1210)  Plan for Continued Treatment (SLP): continue treatment per plan of care (12/16/22 1210)         Plan of Care Reviewed With: patient, other (see comments) (RN, Niurka)  Outcome Evaluation: SLP: Attempted soft, whole texture foods at lunch. Pt with poor appetite and required encouragement to try. Able to take one bite and extended oral prep noted with right pocketing of food. Able to spit out after SLP cue. Did not tolerate and recommend continue with puree diet. Will continue to try to advance diet. May consider, if able, to decrease tube feeding during the day to possible improve appetite. SLP will continue to follow.      SWALLOW EVALUATION (last 72 hours)     SLP Adult Swallow Evaluation     Row Name 12/16/22 1210 12/15/22 1537 12/15/22 0900 12/14/22 1105          Rehab Evaluation    Document Type therapy note (daily note)  -AD therapy note (daily note)  -AD re-evaluation  -AD --     Subjective Information complains of  discomfort in the bed and wanting to get up to a chair; RN notified and SLP assisted wtih repositioning in the bed in the meantime.  -AD no complaints  -AD no complaints  -AD --     Patient Observations alert;cooperative  requires verbal stimulation to initially fully arouse  -AD  alert;cooperative  awakens easily  -AD alert;cooperative  -AD --     Patient/Family/Caregiver Comments/Observations Pt seen upright in bed near 90 degrees. Unable to tolerate fully upright due to stomach pushing into chest and stating uncomfortable.  -AD Pt was seen in bed, upright with wife present. Initially asleep but able to arouse with verbal. Maintains alertness.  -AD Pt was seen upright in bed. Assisted with positioning after getting pt cleaned up. Pt with significant improvement in alertness and cooperativeness.  -AD --     Session Not Performed -- -- -- patient unavailable for treatment  -AD     Evaluation Not Performed, Comment -- -- -- out for biopsy  -AD     Patient Effort adequate  -AD good  -AD good  -AD --     Symptoms Noted During/After Treatment fatigue  -AD none  -AD none;other (see comments)  -AD --     Symptoms Noted, Comment -- -- continues with mild weakness/fatigue, but cooperative and maintains alertness throughout  -AD --        General Information    Patient Profile Reviewed yes  -AD yes  -AD yes  -AD --     Pertinent History Of Current Problem -- No changes since am.  -AD Pt w/change in medical status with concern for hepatocellular carcinoma based on liver biopsy.  -AD --     Current Method of Nutrition pureed;thin liquids  -AD -- NPO;nasogastric feedings;small-bore  -AD --     Precautions/Limitations, Vision -- -- WFL;for purposes of eval  -AD --     Precautions/Limitations, Hearing -- -- WFL;for purposes of eval  -AD --     Prior Level of Function-Communication other (see comments)  continues with encephalopathy; oriented to person  -AD -- other (see comments)  metabolic encephalopathy improving  -AD --     Prior Level of Function-Swallowing -- -- no diet consistency restrictions;regular textures;thin liquids  -AD --     Plans/Goals Discussed with -- -- patient;agreed upon  -AD --     Barriers to Rehab -- -- medically complex  -AD --     Patient's Goals for Discharge -- -- return  home;return to PO diet  -AD --        Pain    Additional Documentation --  discomfort reported and repositioned w/improved affect. No level given.  -AD --  no pain reported or indicated  -AD --  no jpain reported  -AD --        Oral Motor Structure and Function    Oral Lesions or Structural Abnormalities and/or variants -- -- none noted, does have coating on tongue surface feel may be dried secretions  -AD --     Dentition Assessment -- -- natural, present and adequate;other (see comments)  -AD --     Secretion Management -- -- dried secretions in oral cavity  tongue surface  -AD --     Mucosal Quality -- -- dry  -AD --     Volitional Swallow -- -- delayed  -AD --     Volitional Cough -- -- unable to elicit  functional involuntary cough  -AD --        Oral Musculature and Cranial Nerve Assessment    Oral Motor General Assessment -- -- generalized oral motor weakness  -AD --        General Eating/Swallowing Observations    Respiratory Support Currently in Use room air  -AD room air  -AD room air  -AD --     Eating/Swallowing Skills self-fed;appropriate self-feeding skills observed  -AD fed by SLP  -AD self-fed;fed by SLP;other (see comments)  Pt controlled cup drinks.  -AD --     Positioning During Eating upright in bed  near 90 degrees  -AD upright 90 degree;upright in bed  leaning right due to pillows under left side to offset pressure on his bottom  -AD upright 90 degree;upright in bed  -AD --     Utensils Used straw  finger foods  -AD spoon  -AD spoon;cup  -AD --     Consistencies Trialed soft to chew textures;whole  -AD ice chips;nectar/syrup-thick liquids  -AD pureed;thin liquids  -AD --     Pre SpO2 (%) -- -- 98  -AD --     Post SpO2 (%) -- -- 98  -AD --        Respiratory    Respiratory Status WFL;room air;during swallowing/eating  -AD WFL;room air;during swallowing/eating  -AD WFL;room air;during swallowing/eating  -AD --        Clinical Swallow Eval    Oral Prep Phase -- -- WFL  -AD --     Oral Transit --  -- WFL  -AD --     Oral Residue -- -- WFL  -AD --     Pharyngeal Phase -- -- suspected pharyngeal impairment  -AD --     Esophageal Phase -- -- unremarkable  -AD --     Clinical Swallow Evaluation Summary -- -- Pt demonstrates a functional oral swallow. No significant deficits with thins from spoon, cup and applesauce. Pt did exhibit coughing on one trial of water from cup and coughed up a large tanish/greyish mucous ball. No further coughing or choking noted on trials of thins from cup. Did have occasional throat clearing w/further trials of water by cup. Tolerates applesauce w/o any oral or pharyngeal deficits. Pt does demonstrate some fatigue. No esophageal s/s or complaints reported at this time.  -AD --        Pharyngeal Phase Concerns    Pharyngeal Phase Concerns -- -- cough;throat clear  -AD --     Cough -- -- thin  from single cup drink  -AD --     Throat Clear -- -- thin;other (see comments)  inconsistent from cup following choking  -AD --     Pharyngeal Phase Concerns, Comment -- -- Pt w/initial trials of thins w/o s/s of aspiration from spoon and cup. One episode of choking and coughing up large mucous ball. Further trials of thins demonstrate inconsistent throat clearing.  -AD --        SLP Evaluation Clinical Impression    SLP Swallowing Diagnosis -- -- functional oral phase;R/O pharyngeal dysphagia  -AD --     Functional Impact -- -- risk of aspiration/pneumonia;risk of malnutrition;risk of dehydration  -AD --     Rehab Potential/Prognosis, Swallowing -- -- re-evaluate goals as necessary  -AD --        SLP Treatment Clinical Impressions    Treatment Assessment (SLP) continued;oral dysphagia  -AD improved;oral dysphagia;pharyngeal dysphagia  -AD -- --     Treatment Assessment Comments (SLP) Pt continues to demonstrate tolerance of puree and thin liquids. Attempted soft to chew, whole consistency egg salad sandwich. Pt only willing to try one bite. Did not tolerate due to prolonged oral prep and  pocketing in the right lower sulcus. Unable to clear with liquid wash but able to spit out with verbal prompt to do so.  -AD Pt continues to demonstrate tolerance of ice chips, ice cream with functional oropharyngeal swallow Concerns continue with alertness level at times. Feel when he is fully awake, he can tolerate at least puree foods and liquids. Recommend slow rate and allowing time for pt to fully clear and swallow.  -AD -- --     Daily Summary of Progress (SLP) progress toward functional goals is gradual  -AD progress toward functional goals is good  -AD -- --     Barriers to Overall Progress (SLP) Other (see comments)  encephalopathy and decreased appetite  -AD Other (see comments);Medically complex  encephalopathy  -AD -- --     Plan for Continued Treatment (SLP) continue treatment per plan of care  -AD continue treatment per plan of care;goals adjusted to reflect functional improvements demonstrated  -AD -- --     Care Plan Review care plan/treatment goals reviewed;risks/benefits reviewed;current/potential barriers reviewed  -AD evaluation/treatment results reviewed;care plan/treatment goals reviewed;risks/benefits reviewed;current/potential barriers reviewed;patient/other agree to care plan  -AD -- --     Care Plan Review, Other Participant(s) spouse  agrees  -AD spouse  -AD -- --        Recommendations    Therapy Frequency (Swallow) daily;3 days per week;4 days per week;5 days per week  -AD daily;other (see comments);3 days per week  -AD -- --     Predicted Duration Therapy Intervention (Days) 1 week  -AD 1 week  -AD -- --     SLP Diet Recommendation puree;thin liquids;other (see comments)  advance as tolerated to soft texture, whole.  -AD puree;thin liquids;other (see comments)  advance as tolerated to soft texture, whole.  -AD ice chips between meals after oral care, with supervision;other (see comments);temporary alternate methods of nutrition/hydration  meds whole in applesauce  -AD --      Recommended Diagnostics -- -- reassess via clinical swallow evaluation  -AD --     Recommended Precautions and Strategies upright posture during/after eating;small bites of food and sips of liquid;alternate between small bites of food and sips of liquid;check mouth frequently for oral residue/pocketing;general aspiration precautions;reflux precautions;fatigue precautions;1:1 supervision;assist with feeding  -AD upright posture during/after eating;small bites of food and sips of liquid;general aspiration precautions;reflux precautions;fatigue precautions;1:1 supervision;assist with feeding  -AD upright posture during/after eating;small bites of food and sips of liquid;general aspiration precautions;reflux precautions;fatigue precautions;1:1 supervision  -AD --     Oral Care Recommendations Oral Care before breakfast, after meals and PRN;Toothbrush  -AD Oral Care before breakfast, after meals and PRN;Toothbrush  -AD Oral Care BID/PRN;Suction toothbrush  -AD --     SLP Rec. for Method of Medication Administration meds whole;with puree;as tolerated  -AD meds whole;with puree;as tolerated  -AD meds whole;with puree;as tolerated  -AD --     Monitor for Signs of Aspiration notify SLP if any concerns  -AD notify SLP if any concerns  -AD notify SLP if any concerns  -AD --     Anticipated Discharge Disposition (SLP) unknown  -AD unknown  -AD unknown  -AD --     Patient/Family Concerns, Anticipated Discharge Disposition (SLP) No current concerns reported per his wife.  -AD No current concerns per wife.  -AD No concerns reported per pt at this time.  -AD --     Swallowing Considerations per Physician Discretion other (see comments)  consider running tube feeds at night and off during day if feasible to improve overall appetite  -AD -- -- --        Swallow Goals (SLP)    Swallow LTGs -- Patient will demonstrate progress toward functional swallow for  -AD -- --     Diet Tolerance Goal Selection (SLP) -- Patient will tolerate  trials of  -AD -- --        (LTG) Patient will demonstrate progress toward functional swallow for    Diet Texture (Demonstrate progress toward functional swallow) soft to chew (whole) textures  -AD soft to chew (whole) textures  -AD -- --     Liquid viscosity (Demonstrate progress toward functional swallow) thin liquids  -AD thin liquids  -AD -- --     [place] (Demonstrate progress towards functional swallow) independently (over 90% accuracy)  -AD independently (over 90% accuracy)  -AD -- --     Time Frame (Demonstrate progress toward functional swallow) 1 week  -AD 1 week  -AD -- --     Barriers (Demonstrate progress toward functional swallow) medically complex  -AD medically complex  -AD -- --     Progress/Outcomes (Demonstrate progress toward functional swallow) continuing progress toward goal;goal ongoing  -AD new goal  -AD -- --     Comment (Demonstrate progress toward functional swallow) Tolerating puree at this time with thin liquids. Pocketing of soft to chew, whole, in right lower sulcus.  -AD -- -- --        (LTG) Swallow    (LTG) Swallow -- Pt will be able to participate in re-evaluation of swallowing as mental status improves in order to make determinationfor advancement to least restrictive po diet in 2 days.  -AD Pt will be able to participate in re-evaluation of swallowing as mental status improves in order to make determinationfor advancement to least restrictive po diet in 2 days.  -AD --     [place] (Swallow Long Term Goal) -- with 1:1 assist/ supervision  -AD with 1:1 assist/ supervision  -AD --     Time Frame (Swallow Long Term Goal) -- by discharge  -AD by discharge  -AD --     Barriers (Swallow Long Term Goal) -- medically complex  -AD medically complex  -AD --     Progress/Outcomes (Swallow Long Term Goal) -- goal met  -AD good progress toward goal;goal ongoing  -AD --     Comment (Swallow Long Term Goal) -- -- Re-eval completed. See Clinical Swallow Summary  -AD --        (STG)  Patient will tolerate trials of    Consistencies Trialed (Tolerate trials) soft to chew (whole) textures;soft to chew (chopped) textures;soft to chew (ground) textures  -AD soft to chew (whole) textures;soft to chew (chopped) textures;soft to chew (ground) textures  -AD -- --     Desired Outcome (Tolerate trials) without signs of distress;with adequate oral prep/transit/clearance  -AD without signs of distress;with adequate oral prep/transit/clearance  -AD -- --     [place] (Tolerate trials) independently (over 90% accuracy)  -AD independently (over 90% accuracy)  -AD -- --     Time Frame (Tolerate trials) 1 week  -AD 1 week  -AD -- --     Progress/Outcomes (Tolerate trials) unable to make needed progress;goal ongoing  -AD new goal  -AD -- --     Comment (Tolerate trials) Unable to tolerate one bite of soft whole texture of egg salad sandwich. Increased oral prep time and oral transit. Attempted liquid wash spontaneously which did assit with partial oral transit. Oral pocketing in right lower sulcus wtih food. Pt did not clear on his own, but did spit out food upon verbal request per therapist.  -AD -- -- --           User Key  (r) = Recorded By, (t) = Taken By, (c) = Cosigned By    Initials Name Effective Dates    Crissy Vargas MS CCC-SLP 06/16/21 -                 EDUCATION  The patient has been educated in the following areas:   Dysphagia (Swallowing Impairment) Modified Diet Instruction. Wife, Meka, verbalizes understanding. Also demonstrates use of aspiration precautions with upright positioning during po intake.        SLP GOALS     Row Name 12/16/22 1210 12/15/22 1537 12/15/22 0900       (LTG) Patient will demonstrate progress toward functional swallow for    Diet Texture (Demonstrate progress toward functional swallow) soft to chew (whole) textures  -AD soft to chew (whole) textures  -AD --    Liquid viscosity (Demonstrate progress toward functional swallow) thin liquids  -AD thin liquids  -AD  --    [place] (Demonstrate progress towards functional swallow) independently (over 90% accuracy)  -AD independently (over 90% accuracy)  -AD --    Time Frame (Demonstrate progress toward functional swallow) 1 week  -AD 1 week  -AD --    Barriers (Demonstrate progress toward functional swallow) medically complex  -AD medically complex  -AD --    Progress/Outcomes (Demonstrate progress toward functional swallow) continuing progress toward goal;goal ongoing  -AD new goal  -AD --    Comment (Demonstrate progress toward functional swallow) Tolerating puree at this time with thin liquids. Pocketing of soft to chew, whole, in right lower sulcus.  -AD -- --       (LTG) Swallow    (LTG) Swallow -- Pt will be able to participate in re-evaluation of swallowing as mental status improves in order to make determinationfor advancement to least restrictive po diet in 2 days.  -AD Pt will be able to participate in re-evaluation of swallowing as mental status improves in order to make determinationfor advancement to least restrictive po diet in 2 days.  -AD    [place] (Swallow Long Term Goal) -- with 1:1 assist/ supervision  -AD with 1:1 assist/ supervision  -AD    Time Frame (Swallow Long Term Goal) -- by discharge  -AD by discharge  -AD    Barriers (Swallow Long Term Goal) -- medically complex  -AD medically complex  -AD    Progress/Outcomes (Swallow Long Term Goal) -- goal met  -AD good progress toward goal;goal ongoing  -AD    Comment (Swallow Long Term Goal) -- -- Re-eval completed. See Clinical Swallow Summary  -AD       (STG) Patient will tolerate trials of    Consistencies Trialed (Tolerate trials) soft to chew (whole) textures;soft to chew (chopped) textures;soft to chew (ground) textures  -AD soft to chew (whole) textures;soft to chew (chopped) textures;soft to chew (ground) textures  -AD --    Desired Outcome (Tolerate trials) without signs of distress;with adequate oral prep/transit/clearance  -AD without signs  of distress;with adequate oral prep/transit/clearance  -AD --    [place] (Tolerate trials) independently (over 90% accuracy)  -AD independently (over 90% accuracy)  -AD --    Time Frame (Tolerate trials) 1 week  -AD 1 week  -AD --    Progress/Outcomes (Tolerate trials) unable to make needed progress;goal ongoing  -AD new goal  -AD --    Comment (Tolerate trials) Unable to tolerate one bite of soft whole texture of egg salad sandwich. Increased oral prep time and oral transit. Attempted liquid wash spontaneously which did assit with partial oral transit. Oral pocketing in right lower sulcus wtih food. Pt did not clear on his own, but did spit out food upon verbal request per therapist.  -AD -- --          User Key  (r) = Recorded By, (t) = Taken By, (c) = Cosigned By    Initials Name Provider Type    Crissy Vargas MS CCC-SLP Speech and Language Pathologist                   Time Calculation:    Time Calculation- SLP     Row Name 12/16/22 1412             Time Calculation- SLP    SLP Start Time 1210  -AD      SLP Stop Time 1242  -AD      SLP Time Calculation (min) 32 min  -AD      Total Timed Code Minutes- SLP 0 minute(s)  -AD      SLP Non-Billable Time (min) 0 min  -AD      SLP Received On 12/16/22  -AD         Untimed Charges    30652-RU Treatment Swallow Minutes 32  -AD         Total Minutes    Untimed Charges Total Minutes 32  -AD       Total Minutes 32  -AD            User Key  (r) = Recorded By, (t) = Taken By, (c) = Cosigned By    Initials Name Provider Type    Crissy Vargas MS CCC-SLP Speech and Language Pathologist                Therapy Charges for Today     Code Description Service Date Service Provider Modifiers Qty    57617760705 HC ST EVAL ORAL PHARYNG SWALLOW 2 12/15/2022 Crissy Parrish MS CCC-SLP GN 1    [id number] HC ST TREATMENT SWALLOW 2 12/15/2022 Crissy Parrish MS CCC-SLP GN 1    [id number] HC ST TREATMENT SWALLOW 2 12/16/2022 Crissy Parrish MS CCC-SLP GN 1                Crissy Parrish, MS CCC-SLP  12/16/2022

## 2022-12-16 NOTE — CASE MANAGEMENT/SOCIAL WORK
Continued Stay Note  BOUBACAR Dawson     Patient Name: Lionel Whitehead  MRN: [id number]  Today's Date: 12/16/2022    Admit Date: 12/5/2022    Plan: from home w wife, possible STR ?   Discharge Plan     Row Name 12/16/22 0945       Plan    Plan from home w wife, possible STR ?    Plan Comments Chart reviewed, await pathology from liver biopsy to help determine plan of care for dc. Resources provided to patients wife for review for STR if that is needed prior to returning home. CM will continue to follow.               Discharge Codes    No documentation.                     Matt Lee RN

## 2022-12-16 NOTE — PLAN OF CARE
Goal Outcome Evaluation:  Plan of Care Reviewed With: patient        Progress: improving  Outcome Evaluation: Pt oriented to self only but seems to be more conversational, although at times illogical. Several loose bm's this shift, pt's perineum excoriated and red, cream applied. F/C remains in place, urine is dark and concentrated. TF's infusing at goal rate, some difficulty with flushing, dobhoff positional. Midline dressing changed due to bloody drainage and continues to seep blood. All VSS.

## 2022-12-16 NOTE — TELEPHONE ENCOUNTER
ICU called request to talk to Dr Saleem. PT has had 2 large bowel movements this am and now has a distended abdomen.      Provider aware

## 2022-12-16 NOTE — PROGRESS NOTES
Adult Nutrition  Assessment/PES    Patient Name:  Lionel Whitehead  YOB: 1945  MRN: [id number]  Admit Date:  12/5/2022    Assessment Date:  12/16/2022    Comments:  Noted SLP recd stimulate appetite during the day aid with desire for po.  Recommend: turn TF off 1 hr before and 1 hr after meal x 3 per day which would allow for 6 hrs off pump    ( ie: 7a-9a, 11a-1p, 4p-6pm)   This would allow for appetite stimulation.   Otherwise would need to consider alt formula more concentrated to allow for nocturnal feeds.  Will cont to follow and monitor plan for nutrition.      Reason for Assessment     Row Name 12/16/22 1520          Reason for Assessment    Reason For Assessment follow-up protocol     Diagnosis liver disease;infection/sepsis  shock, liver mass pending path, met enceph, cirrhosis, s/p esoph banding                Nutrition/Diet History     Row Name 12/16/22 1521          Nutrition/Diet History    Typical Intake (Food/Fluid/EN/PN) Pt asleep at visit earlier today. DHT in place with EN running, tolerating per RN                Labs/Tests/Procedures/Meds     Row Name 12/16/22 1521          Labs/Procedures/Meds    Lab Results Reviewed reviewed     Lab Results Comments BUn 48 H, glu 163-228        Diagnostic Tests/Procedures    Diagnostic Test/Procedure Reviewed reviewed        Medications    Pertinent Medications Reviewed reviewed     Pertinent Medications Comments lasix risaquad                    Nutrition Prescription Ordered     Row Name 12/16/22 1522          Nutrition Prescription PO    Current PO Diet Pureed        Nutrition Prescription EN    Product Fibersource HN (Jevity 1.2)     TF Delivery Method Continuous     Continuous TF Goal Rate (mL/hr) 80 mL/hr     Water flush (mL)  400 mL     Water Flush Frequency Every 2 hours                Evaluation of Received Nutrient/Fluid Intake     Row Name 12/16/22 1522          Fluid Intake Evaluation    Enteral Fluid (mL) 3320  69%        EN Evaluation     EN Average Volume Delivered (mL/day) --  79% x 1 noted                   Problem/Interventions:   Problem 1     Row Name 12/16/22 1523          Nutrition Diagnoses Problem 1    Problem 1 --     Etiology (related to) --     Signs/Symptoms (evidenced by) --     Resolved? --                  Problem 3     Row Name 12/16/22 1524          Nutrition Diagnoses Problem 3    Problem 3 Biting/Chewing Difficulty     Etiology (related to) Medical Diagnosis     Signs/Symptoms (evidenced by) Report/Observation                  Intervention Goal     Row Name 12/16/22 1524          Intervention Goal    General Maintain nutrition     TF/PN Adjust TF/PN     Transition TF to PO                Nutrition Intervention     Row Name 12/16/22 1524          Nutrition Intervention    RD/Tech Action Encourage intake;Follow Tx progress                Nutrition Prescription     Row Name 12/16/22 1524          Other Orders    Other consider transition to nite feeds                Education/Evaluation     Row Name 12/16/22 1524          Education    Education Education not appropriate at this time        Monitor/Evaluation    Monitor Per protocol;I&O;PO intake;Supplement intake;Pertinent labs;Weight;Symptoms                 Electronically signed by:  Alejandra Chacon RD  12/16/22 15:25 EST

## 2022-12-16 NOTE — PROGRESS NOTES
GI Daily Progress Note    Assessment/Plan:      Upper GI bleed    Metabolic encephalopathy    Hyperammonemia (HCC)    Chronic liver failure without hepatic coma (HCC)       LOS: 10 days     Lionel Whitehead is a 77 y.o. male who was admitted with Upper GI bleed. He reports the symptoms are unchanged. Discussed case with Dr Acevedo, present on floor for consultation. Discussed his distended abd with Nursing and appears to be gaseous distension from his lactulose.  Pt was responsive and verbal and was informed of his diagnosis and prognosis. He appeared to understand and his wife was there to talk over with him. The biopsy unfortunately was non-diagnostic. However his present functional status precludes any palliative treatments.    Subjective:    Patient expresses abdominal pain  Patient denies vomiting and difficulty swallowing    Objective:    Vital signs in last 24 hours:  Temp:  [97.4 °F (36.3 °C)-98.4 °F (36.9 °C)] 97.8 °F (36.6 °C)  Heart Rate:  [77-98] 93  Resp:  [16-20] 16  BP: (104-140)/(60-82) 127/82    Intake/Output last 3 shifts:  I/O last 3 completed shifts:  In: 4850 [P.O.:10; Other:3320; NG/GT:1520]  Out: 2075 [Urine:2075]  Intake/Output this shift:  I/O this shift:  In: 2820 [P.O.:10; Other:2000; NG/GT:810]  Out: 950 [Urine:950]    Physical Exam:Abdomen  Sounds Normal Active Bowel Sounds   Distension Soft and Distended No percussible ascites   Tenderness Mildly Tender     Imaging Results (Last 72 Hours)     Procedure Component Value Units Date/Time    XR Abdomen KUB [[id number]] Collected: 12/14/22 2204     Updated: 12/14/22 2206    Narrative:      CR Abdomen 1 Vw    INDICATION:   Feeding tube placement.    COMPARISON:   12/11/2022    FINDINGS:  AP radiograph(s) of the abdomen. Flexible feeding tube forms a loop in the body of the stomach with the tip directed toward the gastric fundus. Visualized bowel gas pattern is normal.      Impression:      Feeding tube loops in the body of the stomach with the tip  directed toward the fundus of the stomach.    Signer Name: Augie Juan MD   Signed: 12/14/2022 10:04 PM   Workstation Name: [id number]    [facility]    US Guided Liver Biopsy [[id number]] Collected: 12/14/22 1249     Updated: 12/14/22 1418    Narrative:      EXAMINATION:  Ultrasound-guided liver biopsy, 12/14/2022     HISTORY:   77-year-old male hospitalized with abnormal liver function testing,  liver disease and ascites. CT of abdomen demonstrated a cirrhotic liver  with innumerable hypodense liver lesions suspicious for hepatocellular  carcinoma. Ultrasound guided liver biopsy was requested for diagnostic  purposes.      CONSENT:   The procedure, risks and alternatives were discussed in detail with the  patient's wife and written informed consent was obtained. Timeout was  observed in the procedure room for patient identification and procedure  site verification, and the procedure site was marked by me. Permanent  images were recorded.     STERILE TECHNIQUE: Maximal sterile barrier precautions were utilized  during the procedure, including but not limited to: Hand hygiene; use of  chlorhexidine aseptic; sterile gloves, sterile drape/towels, sterile  ultrasound probe cover and sterile ultrasound gel.     PROCEDURE:   Utilizing an anterior approach and ultrasound guidance, 1% buffered  lidocaine utilized for local anesthesia was administered subcutaneously  and to the level of the liver capsule.     Under concurrent real-time ultrasound visualization, a 17-gauge needle  guide was advanced just proximal to an area of diffuse masslike  infiltration in the medial left hepatic lobe with permanent ultrasound  image documentation. An 18-gauge /10cm BioPince core biopsy needle was  utilized and captured a core of the liver within the abnormal area of  interest. Permanent ultrasound image demonstrating the 2.2 cm throw to  be within the area of interest within the liver. 3 core specimens  were  obtained. The cores were placed in formalin. Upon removal of the needle  guide, a Gelfoam slurry was instilled to embolize the biopsy tract.   Hemostasis achieved. A band-aid was applied. The patient's vital signs  remained stable throughout the procedure.       Impression:      Technically successful ultrasound-guided liver biopsy.     This report was finalized on 12/14/2022 2:16 PM by Dr. Dion Goddard MD.             WBC   Date Value Ref Range Status   12/16/2022 12.38 (H) 3.40 - 10.80 10*3/mm3 Final     RBC   Date Value Ref Range Status   12/16/2022 3.29 (L) 4.14 - 5.80 10*6/mm3 Final     Hemoglobin   Date Value Ref Range Status   12/16/2022 10.2 (L) 13.0 - 17.7 g/dL Final     Hematocrit   Date Value Ref Range Status   12/16/2022 35.9 (L) 37.5 - 51.0 % Final     MCV   Date Value Ref Range Status   12/16/2022 109.1 (H) 79.0 - 97.0 fL Final     MCH   Date Value Ref Range Status   12/16/2022 31.0 26.6 - 33.0 pg Final     MCHC   Date Value Ref Range Status   12/16/2022 28.4 (L) 31.5 - 35.7 g/dL Final     RDW   Date Value Ref Range Status   12/16/2022 18.6 (H) 12.3 - 15.4 % Final     RDW-SD   Date Value Ref Range Status   12/16/2022 71.5 (H) 37.0 - 54.0 fl Final     MPV   Date Value Ref Range Status   12/16/2022 14.5 (H) 6.0 - 12.0 fL Final     Platelets   Date Value Ref Range Status   12/16/2022 60 (L) 140 - 450 10*3/mm3 Final     Neutrophil %   Date Value Ref Range Status   12/16/2022 84.2 (H) 42.7 - 76.0 % Final     Lymphocyte %   Date Value Ref Range Status   12/16/2022 5.2 (L) 19.6 - 45.3 % Final     Monocyte %   Date Value Ref Range Status   12/16/2022 6.5 5.0 - 12.0 % Final     Eosinophil %   Date Value Ref Range Status   12/16/2022 1.5 0.3 - 6.2 % Final     Basophil %   Date Value Ref Range Status   12/16/2022 0.4 0.0 - 1.5 % Final     Immature Grans %   Date Value Ref Range Status   12/16/2022 2.2 (H) 0.0 - 0.5 % Final     Neutrophils, Absolute   Date Value Ref Range Status   12/16/2022 10.44 (H) 1.70  - 7.00 10*3/mm3 Final     Lymphocytes, Absolute   Date Value Ref Range Status   12/16/2022 0.64 (L) 0.70 - 3.10 10*3/mm3 Final     Monocytes, Absolute   Date Value Ref Range Status   12/16/2022 0.80 0.10 - 0.90 10*3/mm3 Final     Eosinophils, Absolute   Date Value Ref Range Status   12/16/2022 0.18 0.00 - 0.40 10*3/mm3 Final     Basophils, Absolute   Date Value Ref Range Status   12/16/2022 0.05 0.00 - 0.20 10*3/mm3 Final     Immature Grans, Absolute   Date Value Ref Range Status   12/16/2022 0.27 (H) 0.00 - 0.05 10*3/mm3 Final     nRBC   Date Value Ref Range Status   12/16/2022 0.2 0.0 - 0.2 /100 WBC Final       Glucose   Date Value Ref Range Status   12/16/2022 187 (H) 65 - 99 mg/dL Final     Sodium   Date Value Ref Range Status   12/16/2022 144 136 - 145 mmol/L Final     Potassium   Date Value Ref Range Status   12/16/2022 4.2 3.5 - 5.2 mmol/L Final     Comment:     Slight hemolysis detected by analyzer. Results may be affected.     CO2   Date Value Ref Range Status   12/16/2022 19.7 (L) 22.0 - 29.0 mmol/L Final     Chloride   Date Value Ref Range Status   12/16/2022 113 (H) 98 - 107 mmol/L Final     Anion Gap   Date Value Ref Range Status   12/16/2022 11.3 5.0 - 15.0 mmol/L Final     Creatinine   Date Value Ref Range Status   12/16/2022 1.00 0.76 - 1.27 mg/dL Final     BUN   Date Value Ref Range Status   12/16/2022 48 (H) 8 - 23 mg/dL Final     BUN/Creatinine Ratio   Date Value Ref Range Status   12/16/2022 48.0 (H) 7.0 - 25.0 Final     Calcium   Date Value Ref Range Status   12/16/2022 8.0 (L) 8.6 - 10.5 mg/dL Final     Alkaline Phosphatase   Date Value Ref Range Status   12/16/2022 223 (H) 39 - 117 U/L Final     Total Protein   Date Value Ref Range Status   12/16/2022 5.3 (L) 6.0 - 8.5 g/dL Final     ALT (SGPT)   Date Value Ref Range Status   12/16/2022 137 (H) 1 - 41 U/L Final     AST (SGOT)   Date Value Ref Range Status   12/16/2022 24 1 - 40 U/L Final     Total Bilirubin   Date Value Ref Range Status    12/16/2022 2.6 (H) 0.0 - 1.2 mg/dL Final     Albumin   Date Value Ref Range Status   12/16/2022 2.50 (L) 3.50 - 5.20 g/dL Final     Globulin   Date Value Ref Range Status   12/16/2022 2.8 gm/dL Final     HE  Variceal Bleed S/P EGD w banding 12/6  Shock liver  Alcoholic Cirrhosis  Thrombocytopenia  Elevated AFP  Ascites    Over-all his prognosis is GRIM and palliative approach seems appropriate will give the Wife and him time to discuss with family prior to withdrawing any care but will hold a dose of lactulose due to abd distension continue rifaxamin.  Dr Pond rounding this wknd will have him check in.

## 2022-12-16 NOTE — NURSING NOTE
Large watery BM's this morning.  Pt's stomach is visibly more distended and painful this afternoon.      Left message with Dr. Saleem.

## 2022-12-16 NOTE — CONSULTS
Subjective     REASON FOR CONSULTATION: Possible hepatocellular carcinoma in a patient with advanced cirrhosis of the liver  Provide an opinion on any further workup or treatment                             REQUESTING PHYSICIAN: Son Saleem MD    RECORDS OBTAINED:  Records of the patients history including those obtained from the referring provider were reviewed and summarized in detail.    HISTORY OF PRESENT ILLNESS:  The patient is a 77 y.o. year old male who is currently admitted to the ICU at [facility] due to upper GI bleeding from esophageal varices and hepatic encephalopathy.    His imaging studies that showed multiple lesions within the liver suspicious for hepatocellular carcinoma as well as possible tumor thrombus or clot within the portal vein.    Patient underwent a CT-guided biopsy 12/13/2022.  Final pathology showed only necrotic liver with no obvious malignancy.    Earlier in the hospitalization he required EGD 12/6/2022 with banding of esophageal varices.  He also has been transfused with packed red blood cells.    He underwent paracentesis with removal of almost 5 L of fluid on 12/13/2022.  Cytology on the fluid was negative for malignancy.    History of Present Illness     Past Medical History:   Diagnosis Date   • History of esophageal varices    • Restless leg syndrome         Past Surgical History:   Procedure Laterality Date   • ENDOSCOPY     • ENDOSCOPY W/ BANDING N/A 12/6/2022    Procedure: ESOPHAGOGASTRODUODENOSCOPY WITH BANDING;  Surgeon: Son Saleem MD;  Location: [facility];  Service: Gastroenterology;  Laterality: N/A;  varices        No current facility-administered medications on file prior to encounter.     Current Outpatient Medications on File Prior to Encounter   Medication Sig Dispense Refill   • gabapentin (NEURONTIN) 600 MG tablet TAKE TWO TABLETS BY MOUTH THREE TIMES A DAY AND THREE TABLETS AT BEDTIME. 810 tablet 1   • ibuprofen  (ADVIL,MOTRIN) 200 MG tablet Take 1 tablet by mouth Daily.     • multivitamin with minerals tablet tablet Take 1 tablet by mouth Daily.     • oxyCODONE-acetaminophen (Percocet)  MG per tablet Take 1 tablet by mouth 2 (Two) Times a Day As Needed for Moderate Pain. 15 tablet 0        ALLERGIES:  No Known Allergies     Social History     Socioeconomic History   • Marital status:    Tobacco Use   • Smoking status: Former     Types: Pipe     Quit date:      Years since quittin.9   • Smokeless tobacco: Never   Vaping Use   • Vaping Use: Never used   Substance and Sexual Activity   • Alcohol use: Defer   • Drug use: Defer   • Sexual activity: Defer        Family History   Problem Relation Age of Onset   • No Known Problems Other         Review of Systems   Reason unable to perform ROS: Patient is encephalopathic.   Cardiovascular: Positive for leg swelling.   Gastrointestinal: Positive for abdominal distention.        Objective     Vitals:    22 0900 22 1000 22 1100 22 1104   BP:       BP Location:       Patient Position:       Pulse: 84 84 87    Resp:       Temp:    97.5 °F (36.4 °C)   TempSrc:    Oral   SpO2: 100% 100%     Weight:       Height:         No flowsheet data found.    Physical Exam  Abdominal:      General: There is distension.   Musculoskeletal:      Right lower leg: Edema present.      Left lower leg: Edema present.   Skin:     Coloration: Skin is pale.   Psychiatric:      Comments: Encephalopathic           RECENT LABS:  Hematology WBC   Date Value Ref Range Status   2022 12.38 (H) 3.40 - 10.80 10*3/mm3 Final     RBC   Date Value Ref Range Status   2022 3.29 (L) 4.14 - 5.80 10*6/mm3 Final     Hemoglobin   Date Value Ref Range Status   2022 10.2 (L) 13.0 - 17.7 g/dL Final     Hematocrit   Date Value Ref Range Status   2022 35.9 (L) 37.5 - 51.0 % Final     Platelets   Date Value Ref Range Status   2022 60 (L) 140 - 450 10*3/mm3 Final         Lab Results   Component Value Date    GLUCOSE 187 (H) 12/16/2022    BUN 48 (H) 12/16/2022    CREATININE 1.00 12/16/2022    BCR 48.0 (H) 12/16/2022    K 4.2 12/16/2022    CO2 19.7 (L) 12/16/2022    CALCIUM 8.0 (L) 12/16/2022    PROTENTOTREF 6.9 06/20/2022    ALBUMIN 2.50 (L) 12/16/2022    LABIL2 1.8 06/20/2022    AST 24 12/16/2022     (H) 12/16/2022     Component   Ref Range & Units 4 d ago   (12/12/22) 8 d ago   (12/8/22) 9 d ago   (12/7/22) 9 d ago   (12/7/22)   Ammonia   16 - 60 umol/L 15 Low   126 High   142 High   174 High          Assessment & Plan     1.  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  Liver biopsy pathology is pending.  2.  Advanced cirrhosis of the liver  3.  Upper GI bleed from esophageal varices.  Patient is status post EGD with banding on 12/6/2022  4.  Anemia of blood loss  5.  Thrombocytopenia due to liver disease and splenomegaly.  6.  Hepatic encephalopathy.  Patient currently on Xifaxan and lactulose  7.  Coagulopathy of liver disease.  8.  Generally poor performance status.    Recommendations  1.  We reviewed the final pathology and discussed the results with the patient and his wife at the bedside.  Although the specimen does not show definite malignancy there certainly could be some sampling error and the overall clinical situation would suggest hepatocellular carcinoma with hepatic decompensation.  2.  We discussed the situation honestly with the patient and his wife.  I explained that even if we had a definitive diagnosis of cancer we would not be able to effectively treat him and his disease appears to be very close to end-stage.  3.  We recommended considering palliative care focusing on comfort.  4.  We mentioned the option of hospice care which I think she would be amenable to.  5.  We will not be following routinely in the hospital but please call again if there is something we can do to help this unfortunate gentleman.

## 2022-12-16 NOTE — PROGRESS NOTES
"SERVICE: [facility] HOSPITALIST    CONSULTANTS: Gastroenterology    CHIEF COMPLAINT: Follow-up metabolic encephalopathy and liver cirrhosis with variceal bleeding    SUBJECTIVE: Patient seen and examined at bedside.  Patient is currently AO x1, oriented to self but not place or time.  Counseled him on his current location and condition.  He seems to express understanding of this.  He reports no acute complaints at this time.  Nursing staff reports no acute issues and notices continued improvement in his mentation.  He is significantly improved from last time I evaluated the patient.     OBJECTIVE:    Physical exam is largely unchanged from previous exam, except where documented below, examination is accurate as of 12/16/2022    Physical Exam:  General: Patient is awake and alert.  Currently alert and oriented x1 to self only.  obese elderly male. No acute distress noted.   HENT: Head is atraumatic, normocephalic. Hearing is grossly intact. Nose is without obvious congestion and appears patent. Neck is supple and trachea is midline.   Eyes: Vision is grossly intact. Pupils appear equal and round.   Cardiovascular: Heart has regular rate and rhythm with no murmurs, rubs or gallops noted.   Respiratory: Lungs are clear to ausculation without wheezes, rhonchi or rales.   Abdominal/GI: Soft, nontender, bowel sounds present. No rebound or guarding present.   Extremities: No peripheral edema noted.   Musculoskeletal: Spontaneous movement of bilateral upper and lower extremities against gravity noted. No signs of injury or deformity noted.   Skin: Warm and dry.   Psych: Mood and affect are appropriate. Cooperative with exam.   Neuro: No facial asymmetry noted. No focal deficits noted, hearing and vision are grossly intact.     /65 (BP Location: Right arm, Patient Position: Lying)   Pulse 79   Temp 98 °F (36.7 °C) (Oral)   Resp 16   Ht 185.4 cm (73\")   Wt 103 kg (227 lb 11.8 oz)   SpO2 100%   " BMI 30.05 kg/m²     MEDS/LABS REVIEWED AND ORDERED    furosemide, 40 mg, Intravenous, BID  lactobacillus acidophilus, 1 capsule, Nasogastric, Daily  lactulose, 20 g, Nasogastric, Q6H  pantoprazole, 40 mg, Intravenous, Q AM  rifAXIMin, 600 mg, Oral, Q12H  sodium chloride, 10 mL, Intravenous, Q12H  sodium chloride, 10 mL, Intravenous, Q12H  sodium chloride, 10 mL, Intravenous, Q12H          LAB/DIAGNOSTICS:    Lab Results (last 24 hours)     Procedure Component Value Units Date/Time    CBC & Differential [[id number]]  (Abnormal) Collected: 12/16/22 0550    Specimen: Blood Updated: 12/16/22 0614    Narrative:      The following orders were created for panel order CBC & Differential.  Procedure                               Abnormality         Status                     ---------                               -----------         ------                     CBC Auto Differential[[id number]]        Abnormal            Final result               Scan Slide[[id number]]                                                                    Please view results for these tests on the individual orders.    CBC Auto Differential [[id number]]  (Abnormal) Collected: 12/16/22 0550    Specimen: Blood Updated: 12/16/22 0614     WBC 12.38 10*3/mm3      RBC 3.29 10*6/mm3      Hemoglobin 10.2 g/dL      Hematocrit 35.9 %      .1 fL      MCH 31.0 pg      MCHC 28.4 g/dL      RDW 18.6 %      RDW-SD 71.5 fl      MPV 14.5 fL      Platelets 60 10*3/mm3      Neutrophil % 84.2 %      Lymphocyte % 5.2 %      Monocyte % 6.5 %      Eosinophil % 1.5 %      Basophil % 0.4 %      Immature Grans % 2.2 %      Neutrophils, Absolute 10.44 10*3/mm3      Lymphocytes, Absolute 0.64 10*3/mm3      Monocytes, Absolute 0.80 10*3/mm3      Eosinophils, Absolute 0.18 10*3/mm3      Basophils, Absolute 0.05 10*3/mm3      Immature Grans, Absolute 0.27 10*3/mm3      nRBC 0.2 /100 WBC     Comprehensive Metabolic Panel [[id number]] Collected: 12/16/22 0550    Specimen:  Blood Updated: 12/16/22 0604    POC Glucose Once [[id number]]  (Abnormal) Collected: 12/16/22 0008    Specimen: Blood Updated: 12/16/22 0014     Glucose 163 mg/dL      Comment: Meter: [id number] : [id number] Norma Neil RN       POC Glucose Once [[id number]]  (Abnormal) Collected: 12/15/22 1810    Specimen: Blood Updated: 12/15/22 1817     Glucose 228 mg/dL      Comment: Meter: [id number] : [id number] DinnerTimeberly CNA       POC Glucose Once [[id number]]  (Normal) Collected: 12/15/22 1226    Specimen: Blood Updated: 12/15/22 1232     Glucose 127 mg/dL      Comment: Meter: [id number] : [id number] Villarreal Meggan CNA       Comprehensive Metabolic Panel [[id number]]  (Abnormal) Collected: 12/15/22 1151    Specimen: Blood Updated: 12/15/22 1228     Glucose 136 mg/dL      BUN 48 mg/dL      Creatinine 1.12 mg/dL      Sodium 150 mmol/L      Potassium 4.0 mmol/L      Chloride 119 mmol/L      CO2 19.7 mmol/L      Calcium 8.3 mg/dL      Total Protein 5.8 g/dL      Albumin 2.80 g/dL      ALT (SGPT) 204 U/L      AST (SGOT) 25 U/L      Alkaline Phosphatase 251 U/L      Total Bilirubin 3.1 mg/dL      Globulin 3.0 gm/dL      A/G Ratio 0.9 g/dL      BUN/Creatinine Ratio 42.9     Anion Gap 11.3 mmol/L      eGFR 68.5 mL/min/1.73      Comment: National Kidney Foundation and American Society of Nephrology (ASN) Task Force recommended calculation based on the Chronic Kidney Disease Epidemiology Collaboration (CKD-EPI) equation refit without adjustment for race.       Narrative:      GFR Normal >60  Chronic Kidney Disease <60  Kidney Failure <15    The GFR formula is only valid for adults with stable renal function between ages 18 and 70.    CBC & Differential [[id number]]  (Abnormal) Collected: 12/15/22 1151    Specimen: Blood Updated: 12/15/22 1214    Narrative:      The following orders were created for panel order CBC & Differential.  Procedure                               Abnormality         Status                      ---------                               -----------         ------                     CBC Auto Differential[[id number]]        Abnormal            Final result               Scan Slide[[id number]]                                                                    Please view results for these tests on the individual orders.    CBC Auto Differential [[id number]]  (Abnormal) Collected: 12/15/22 1151    Specimen: Blood Updated: 12/15/22 1214     WBC 12.81 10*3/mm3      RBC 3.46 10*6/mm3      Hemoglobin 10.4 g/dL      Hematocrit 35.6 %      .9 fL      MCH 30.1 pg      MCHC 29.2 g/dL      RDW 18.3 %      RDW-SD 65.6 fl      MPV 14.2 fL      Platelets 79 10*3/mm3      Neutrophil % 81.4 %      Lymphocyte % 5.9 %      Monocyte % 8.4 %      Eosinophil % 1.4 %      Basophil % 0.1 %      Immature Grans % 2.8 %      Neutrophils, Absolute 10.44 10*3/mm3      Lymphocytes, Absolute 0.75 10*3/mm3      Monocytes, Absolute 1.07 10*3/mm3      Eosinophils, Absolute 0.18 10*3/mm3      Basophils, Absolute 0.01 10*3/mm3      Immature Grans, Absolute 0.36 10*3/mm3         ECG 12 Lead Rhythm Change   Final Result   HEART RATE= 118  bpm   RR Interval= 508  ms   CT Interval= 151  ms   P Horizontal Axis= 50  deg   P Front Axis= 35  deg   QRSD Interval= 93  ms   QT Interval= 305  ms   QRS Axis= -21  deg   T Wave Axis= 17  deg   - ABNORMAL ECG -   Sinus tachycardia   Ventricular premature complex   Borderline left axis deviation   Non-specific STT wave change   Inferior infarct, old   Fusion complex   No change from prior tracing   Electronically Signed By: Nicola Hernández ([facility]) 10-Dec-2022 22:37:28   Date and Time of Study: 2022-12-10 08:17:42      ECG 12 Lead Rhythm Change   Final Result   HEART RATE= 103  bpm   RR Interval= 600  ms   CT Interval= 149  ms   P Horizontal Axis= 13  deg   P Front Axis= 25  deg   QRSD Interval= 76  ms   QT Interval= 354  ms   QRS Axis= -24  deg   T Wave Axis= 51  deg   - ABNORMAL ECG -   Sinus tachycardia    Paired ventricular premature complexes   Inferior infarct, old   now pvcs and pacs   Electronically Signed By: Deirdre Rg ([facility]) 09-Dec-2022 12:01:05   Date and Time of Study: 2022-12-08 23:59:26      ECG 12 Lead Altered Mental Status   Final Result   HEART RATE= 96  bpm   RR Interval= 620  ms   NE Interval= 155  ms   P Horizontal Axis= 20  deg   P Front Axis= 28  deg   QRSD Interval= 85  ms   QT Interval= 351  ms   QRS Axis= -11  deg   T Wave Axis= 13  deg   - ABNORMAL ECG -   Sinus rhythm   Inferior infarct, old   NO PRIOR TRACING AVAILABLE FOR COMPARISON   Electronically Signed By: Eugene Polo ([facility]) 06-Dec-2022 07:38:49   Date and Time of Study: 2022-12-06 00:34:09          US Guided Liver Biopsy    Result Date: 12/14/2022  Technically successful ultrasound-guided liver biopsy.  This report was finalized on 12/14/2022 2:16 PM by Dr. Dion Goddard MD.      XR Abdomen KUB    Result Date: 12/14/2022  Feeding tube loops in the body of the stomach with the tip directed toward the fundus of the stomach. Signer Name: Augie Juan MD  Signed: 12/14/2022 10:04 PM  Workstation Name: [id number]  [facility]        ASSESSMENT/PLAN:  Please not portions of this assessment/plan may have been copied and pasted, but I have personally seen this patient and reviewed each line of this assessment and plan for accuracy and made updates to reflect my necessary changes on 12/16/2022     Metabolic encephalopathy  -Patient continues to improve.  -Continue lactulose, patient having appropriate number of bowel movements  -Continue Free water as patient has been hypernatremic    Liver cirrhosis/esophageal and gastric variceal bleeding status post banding  -GI continues to follow.  He has completed therapy with octreotide and Protonix, therefore drips have been discontinued.     Liver mass-status postbiopsy awaiting pathology report.     Suspected aspiration pneumonia-stable on room air, has completed  "Augmentin course    Hypernatremia- continue free water as above    Thrombocytopenia-platelet count remains low but stable no need of transfusion at this time.  Continue to monitor.     PLAN FOR DISPOSITION: RUPESH Mota DO  Hospitalist, [facility]  12/16/22  06:48 EST    At [facility], we believe that sharing information builds trust and better relationships. You are receiving this note because you recently visited [facility]. It is possible you will see health information before a provider has talked with you about it. This kind of information can be easy to misunderstand. To help you fully understand what it means for your health, we urge you to discuss this note with your provider.    \"Dictated utilizing Dragon dictation\"    "

## 2022-12-17 NOTE — PLAN OF CARE
Goal Outcome Evaluation:  Plan of Care Reviewed With: patient, spouse        Progress: no change  Outcome Evaluation: Pt's spouse is interested in information regarding Hospice care for her . His daughter visited this shift. No significant changes this shift. VSS at this time.

## 2022-12-17 NOTE — PLAN OF CARE
Goal Outcome Evaluation:              Outcome Evaluation: VSS.  PT had 3 BM's today.  Hold 1900 lactulose per el.  Resumed am dose.  TF remained - see order.  Lasix iv.  Confusion seems to be worsening this evening.  Midline dressing changed today.

## 2022-12-17 NOTE — PROGRESS NOTES
"SERVICE: [facility] HOSPITALIST    CONSULTANTS: Gastroenterology    CHIEF COMPLAINT: Follow-up metabolic encephalopathy and liver cirrhosis with variceal bleeding    SUBJECTIVE: Patient seen and examined at bedside.  Patient remains confused but awake and alert.  He is oriented to self only.  Patient slumped over to left side of bed having to be pulled up by nursing staff and myself.  He reports no acute issues.  Nursing staff reports no acute issues.     I called patient's wife to discuss his prognosis, she is agreeable to hospice consultation.     OBJECTIVE:    Physical exam is largely unchanged from previous exam, except where documented below, examination is accurate as of 12/17/2022    Physical Exam:  General: Patient is awake and alert.  Currently alert and oriented x1 to self only.  obese elderly male. No acute distress noted.   HENT: Head is atraumatic, normocephalic. Hearing is grossly intact. Nose is without obvious congestion and appears patent. Neck is supple and trachea is midline.   Eyes: Vision is grossly intact. Pupils appear equal and round.   Cardiovascular: Heart has regular rate and rhythm with no murmurs, rubs or gallops noted.   Respiratory: Lungs are clear to ausculation without wheezes, rhonchi or rales.   Abdominal/GI: Soft, nontender, bowel sounds present. No rebound or guarding present.   Extremities: No peripheral edema noted.   Musculoskeletal: Spontaneous movement of bilateral upper and lower extremities against gravity noted. No signs of injury or deformity noted.   Skin: Warm and dry.   Psych: Mood and affect are appropriate. Cooperative with exam.   Neuro: No facial asymmetry noted. No focal deficits noted, hearing and vision are grossly intact.     /63 (BP Location: Right arm, Patient Position: Lying)   Pulse 69   Temp 98.3 °F (36.8 °C) (Oral)   Resp 18   Ht 185.4 cm (73\")   Wt 103 kg (227 lb 11.8 oz)   SpO2 94%   BMI 30.05 kg/m²     MEDS/LABS REVIEWED " AND ORDERED    furosemide, 40 mg, Intravenous, BID  lactobacillus acidophilus, 1 capsule, Nasogastric, Daily  lactulose, 20 g, Nasogastric, Q6H  pantoprazole, 40 mg, Intravenous, Q AM  rifAXIMin, 600 mg, Oral, Q12H  sodium chloride, 10 mL, Intravenous, Q12H  sodium chloride, 10 mL, Intravenous, Q12H  sodium chloride, 10 mL, Intravenous, Q12H          LAB/DIAGNOSTICS:    Lab Results (last 24 hours)     Procedure Component Value Units Date/Time    POC Glucose Once [[id number]]  (Abnormal) Collected: 12/17/22 0802    Specimen: Blood Updated: 12/17/22 0808     Glucose 237 mg/dL      Comment: Meter: [id number] : [id number] Alisia Steven CNA       Body Fluid Culture - Body Fluid, Peritoneum [[id number]] Collected: 12/13/22 1104    Specimen: Body Fluid from Peritoneum Updated: 12/17/22 0755     Body Fluid Culture No growth at 4 days     Gram Stain Occasional WBCs seen      No organisms seen    POC Glucose Once [[id number]]  (Abnormal) Collected: 12/17/22 0040    Specimen: Blood Updated: 12/17/22 0046     Glucose 212 mg/dL      Comment: Meter: [id number] : [id number] Norma Neil RN       POC Glucose Once [[id number]]  (Abnormal) Collected: 12/16/22 1733    Specimen: Blood Updated: 12/16/22 1740     Glucose 174 mg/dL      Comment: Meter: [id number] : [id number] Clemencia William RN       Tissue Pathology Exam [[id number]] Collected: 12/14/22 1159    Specimen: Tissue from Liver Updated: 12/16/22 1423     Case Report --     Surgical Pathology Report                         Case: [id number]                                  Authorizing Provider:  Son Saleem        Collected:           12/14/2022 11:59 AM                                 MD [place]                                                                   Ordering Location:     [facility]   Received:            12/14/2022 12:25 PM                                 ICU                                                                           Pathologist:           Joanne Lin MD                                                    Specimen:    Liver                                                                                       Final Diagnosis --     1. Liver, Ultrasound-Guided Core Biopsy For Lesions:                A. Cirrhosis with associated necrotic hepatic tissue (see Comment).              B. Negative for increased iron by iron special stain.              C. No definitive malignancy identified.    SWM/clm       Comment --     The viable liver tissue is all cirrhotic, with the reticulin failing to highlight any definitive carcinoma. Some of the cores are necrotic, thus an adjacent neoplastic process such as HCC cannot be excluded. This case is reviewed internally with Dr. Allen, who concurs.       Gross Description --     1. Liver.   Received in formalin labeled with the patient's name and designated 'liver needle biopsy' are four tan to grey white variegated core fragments of soft tissue measuring from 0.6 cm up to 2.0 x 0.1 cm in tubal diameter. The tissue is submitted in 1A.    mb/uso/jat/clm       Special Stains --     Utilizing appropriate controls, multiple special stains are performed including Iron, Trichrome and Reticulin.  Iron is negative for increased iron stores.  Trichrome demonstrates diffuse periportal and bridging fibrosis supporting cirrhosis.  A Reticulin stain highlights intact liver plates within the viable areas without definitive evidence of a neoplastic process.      POC Glucose Once [[id number]]  (Abnormal) Collected: 12/16/22 1151    Specimen: Blood Updated: 12/16/22 1157     Glucose 167 mg/dL      Comment: Meter: [id number] : [id number] Roxane Bah RN           ECG 12 Lead Rhythm Change   Final Result   HEART RATE= 118  bpm   RR Interval= 508  ms   NV Interval= 151  ms   P Horizontal Axis= 50  deg   P Front Axis= 35  deg   QRSD Interval= 93  ms   QT Interval= 305  ms   QRS Axis= -21  deg   T  Wave Axis= 17  deg   - ABNORMAL ECG -   Sinus tachycardia   Ventricular premature complex   Borderline left axis deviation   Non-specific STT wave change   Inferior infarct, old   Fusion complex   No change from prior tracing   Electronically Signed By: Nicola Hernández ([facility]) 10-Dec-2022 22:37:28   Date and Time of Study: 2022-12-10 08:17:42      ECG 12 Lead Rhythm Change   Final Result   HEART RATE= 103  bpm   RR Interval= 600  ms   [place] Interval= 149  ms   P Horizontal Axis= 13  deg   P Front Axis= 25  deg   QRSD Interval= 76  ms   QT Interval= 354  ms   QRS Axis= -24  deg   T Wave Axis= 51  deg   - ABNORMAL ECG -   Sinus tachycardia   Paired ventricular premature complexes   Inferior infarct, old   now pvcs and pacs   Electronically Signed By: Deirdre Rg ([facility]) 09-Dec-2022 12:01:05   Date and Time of Study: 2022-12-08 23:59:26      ECG 12 Lead Altered Mental Status   Final Result   HEART RATE= 96  bpm   RR Interval= 620  ms   [place] Interval= 155  ms   P Horizontal Axis= 20  deg   P Front Axis= 28  deg   QRSD Interval= 85  ms   QT Interval= 351  ms   QRS Axis= -11  deg   T Wave Axis= 13  deg   - ABNORMAL ECG -   Sinus rhythm   Inferior infarct, old   NO PRIOR TRACING AVAILABLE FOR COMPARISON   Electronically Signed By: Eugene Polo ([facility]) 06-Dec-2022 07:38:49   Date and Time of Study: 2022-12-06 00:34:09          No radiology results for the last day      ASSESSMENT/PLAN:  Please not portions of this assessment/plan may have been copied and pasted, but I have personally seen this patient and reviewed each line of this assessment and plan for accuracy and made updates to reflect my necessary changes on 12/17/2022     Metabolic encephalopathy  -treat as below    Liver cirrhosis/esophageal and gastric variceal bleeding status post banding, shock liver  -GI continues to follow.  He has completed therapy with octreotide and Protonix, therefore drips have been discontinued.   -Despite improvement in ammonia level he  "remains with encephalopathy likely due to his severe cirrhosis. Palliative suggested by GI. Wife agreeable. Will consult Hospice.     Liver mass-biopsy indeterminate .     Suspected aspiration pneumonia-stable on room air, has completed Augmentin course    Hypernatremia- continue free water as above    Thrombocytopenia-platelet count remains low but stable no need of transfusion at this time.  Continue to monitor.     PLAN FOR DISPOSITION: RUPESH oMta DO  Hospitalist, [facility]  12/17/22  06:48 EST    At [facility], we believe that sharing information builds trust and better relationships. You are receiving this note because you recently visited [facility]. It is possible you will see health information before a provider has talked with you about it. This kind of information can be easy to misunderstand. To help you fully understand what it means for your health, we urge you to discuss this note with your provider.    \"Dictated utilizing Dragon dictation\"    "

## 2022-12-17 NOTE — PLAN OF CARE
Goal Outcome Evaluation:  Plan of Care Reviewed With: patient        Progress: no change  Outcome Evaluation: VSS, sleepy but will answer questions and follow commands.Appetite fair, continues on tube feeding with automatic water flushes, abdomen distended, ble edemetous and heel lift boots in place, iv lasix given, good urine output recorded, F/C in place.

## 2022-12-17 NOTE — PROGRESS NOTES
Gastroenterology   Inpatient Progress Note    Reason for Follow Up: GI bleed, hepatic encephalopathy    Subjective     Interval History:   Patient states he resting comfortably this morning.  Tube feeds running at 80 cc/h.  Case discussed with primary physician and hospice has been called.    Current Facility-Administered Medications:   •  furosemide (LASIX) injection 40 mg, 40 mg, Intravenous, BID, Kd Carter MD, 40 mg at 12/17/22 0828  •  hydrocortisone-bacitracin-zinc oxide-nystatin (MAGIC BARRIER) ointment 1 application, 1 application, Topical, Q4H PRN, Son Saleem MD  •  labetalol (NORMODYNE,TRANDATE) injection 10 mg, 10 mg, Intravenous, Q2H PRN, Kd Carter MD, 10 mg at 12/11/22 1357  •  lactobacillus acidophilus (RISAQUAD) capsule 1 capsule, 1 capsule, Nasogastric, Daily, Kd Carter MD, 1 capsule at 12/17/22 0828  •  lactulose solution 20 g, 20 g, Nasogastric, Q6H, Kd Carter MD, 20 g at 12/17/22 0630  •  pantoprazole (PROTONIX) injection 40 mg, 40 mg, Intravenous, Q AM, Kd Carter MD, 40 mg at 12/17/22 0548  •  potassium chloride (K-DUR,KLOR-CON) CR tablet 40 mEq, 40 mEq, Oral, PRN **OR** potassium chloride (KLOR-CON) packet 40 mEq, 40 mEq, Nasogastric, PRN, 40 mEq at 12/10/22 2026 **OR** potassium chloride 10 mEq in 100 mL IVPB, 10 mEq, Intravenous, Q1H PRN, Kd Carter MD  •  rifAXIMin (XIFAXAN) tablet 600 mg, 600 mg, Oral, Q12H, Son Saleem MD, 600 mg at 12/17/22 0828  •  sodium chloride 0.9 % flush 10 mL, 10 mL, Intravenous, PRN, Kd Carter MD  •  sodium chloride 0.9 % flush 10 mL, 10 mL, Intravenous, Q12H, Kd Carter MD, 10 mL at 12/17/22 0829  •  sodium chloride 0.9 % flush 10 mL, 10 mL, Intravenous, Q12H, Kd Carter MD, 10 mL at 12/17/22 0829  •  sodium chloride 0.9 % flush 10 mL, 10 mL, Intravenous, Q12H, Kd Carter MD, 10 mL at 12/17/22 0829  •  sodium chloride 0.9 % flush 10 mL, 10 mL, Intravenous, PRN, Kd Carter,  MD  •  sodium chloride 0.9 % flush 20 mL, 20 mL, Intravenous, Raul HYLTON Rusty T, MD  •  sodium chloride 0.9 % infusion 40 mL, 40 mL, Intravenous, Raul HYLTON Rusty T, MD, 40 mL at 12/06/22 0620  Review of Systems:    Review of systems could not be obtained due to  patient confusion.    Objective     Vital Signs  Temp:  [97.5 °F (36.4 °C)-98.6 °F (37 °C)] 98.3 °F (36.8 °C)  Heart Rate:  [69-93] 69  Resp:  [16-20] 18  BP: (119-142)/(63-85) 134/63  Body mass index is 30.05 kg/m².    Intake/Output Summary (Last 24 hours) at 12/17/2022 0958  Last data filed at 12/17/2022 0906  Gross per 24 hour   Intake 5306 ml   Output 2325 ml   Net 2981 ml     I/O this shift:  In: -   Out: 200 [Urine:200]     Physical Exam:   General: patient resting comfortably, Dobbhoff tube in right nares with tube feeds at 80 cc/h   Eyes: Mild scleral icterus   Skin: warm and dry, not jaundiced   Abdomen: soft, nontender, modestly distended; normal bowel sounds, no masses palpated, no periumbical lymphadenopathy   Psychiatric: Responds to verbal cues with some garbled sounds     Results Review:     I reviewed the patient's new clinical results.    Results from last 7 days   Lab Units 12/16/22  0550 12/15/22  1151 12/14/22  0511   WBC 10*3/mm3 12.38* 12.81* 9.47   HEMOGLOBIN g/dL 10.2* 10.4* 10.4*   HEMATOCRIT % 35.9* 35.6* 34.7*   PLATELETS 10*3/mm3 60* 79* 62*     Results from last 7 days   Lab Units 12/16/22  0550 12/15/22  1151 12/14/22  0511   SODIUM mmol/L 144 150* 149*   POTASSIUM mmol/L 4.2 4.0 4.3   CHLORIDE mmol/L 113* 119* 120*   CO2 mmol/L 19.7* 19.7* 19.4*   BUN mg/dL 48* 48* 43*   CREATININE mg/dL 1.00 1.12 0.97   CALCIUM mg/dL 8.0* 8.3* 8.1*   BILIRUBIN mg/dL 2.6* 3.1* 2.7*   ALK PHOS U/L 223* 251* 262*   ALT (SGPT) U/L 137* 204* 327*   AST (SGOT) U/L 24 25 25   GLUCOSE mg/dL 187* 136* 192*     Results from last 7 days   Lab Units 12/14/22  0511   INR  1.77*     Lab Results   Lab Value Date/Time    LIPASE 34 12/06/2022 0012        Radiology:  XR Abdomen KUB   Final Result   Feeding tube loops in the body of the stomach with the tip directed toward the fundus of the stomach.      Signer Name: Augie Juan MD    Signed: 12/14/2022 10:04 PM    Workstation Name: [email or web address]     [facility]      US Guided Liver Biopsy   Final Result   Technically successful ultrasound-guided liver biopsy.       This report was finalized on 12/14/2022 2:16 PM by Dr. Dion Goddard MD.          CT Abdomen With & Without Contrast   Final Result      1. 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 Splenomegaly present.   2. There is also suggestion of partial thrombosis of the portal confluence and SMV and could be related to the jejunal wall thickening.   3. Cholelithiasis.   4. Nonobstructing right renal calculus.   5. Significant infiltrate right lung base   6. Infrarenal abdominal aortic aneurysm. Recommend yearly surveillance.      Signer Name: Cherry Chatterjee MD    Signed: 12/13/2022 1:14 PM    Workstation [facility]: [facility]     [facility]      US Paracentesis   Final Result   Technically successful ultrasound-guided paracentesis with removal of   4.8 L ascites fluid.       This report was finalized on 12/13/2022 3:36 PM by Dr. Sb Mak MD.          XR Abdomen KUB   Final Result   Feeding tube looped in the proximal stomach. Consider advancing the tube.      Signer Name: Augie Juan MD    Signed: 12/11/2022 11:02 PM    Workstation Name: [facility]     Radiology [facility]      XR Shoulder 2+ View Right   Final Result      1. No acute fracture or dislocation.   2. Slightly high riding humeral head raising concern for possible rotator cuff tear. This can be further evaluated with a nonemergent noncontrast MRI of the right shoulder on an outpatient basis.   3. Mild AC joint  arthrosis.      Signer Name: Jersey Cabrera MD    Signed: 12/10/2022 5:11 PM    Workstation [facility]: [email or web address]     [facility]      US Abdomen Complete   Final Result      1.  Technically difficult exam due to patient condition and patient body habitus per technologist report.   2.  Enlarged liver with coarse echotexture and nodularity likely reflecting underlying liver disease.   3.  Splenomegaly with small volume ascites.   4.  Cholelithiasis with gallbladder wall thickening. Unable to assess for sonographic Graves's due to patient sedation.   5.  No evidence of hydronephrosis. No definite renal calculi although evaluation is limited.   6.  Infrarenal abdominal aortic aneurysm measuring up to 3.4 cm.      Signer Name: Paco Mckeon MD    Signed: 12/9/2022 5:11 PM    Workstation Name: [email or web address]     [facility]      MRI Brain Without Contrast   Final Result      1. Study severely motion degraded. Allowing for this, there is no evidence for acute infarct. There is generalized atrophy and mild probable sequelae of small vessel disease.      Signer Name: Libby Tyler MD    Signed: 12/9/2022 1:46 PM    Workstation Name: [email or web address]     [facility]      XR Abdomen KUB   Final Result   1. Enteric tube tip at the level of the mid body stomach.   2. Probable right renal/pelvic stone and additional findings suspicious   for cholelithiasis.       This report was finalized on 12/8/2022 2:21 PM by Dr. Dion Goddard MD.          XR Chest 1 View   Final Result   Airspace disease right lung including the right lung base with some elevation the right hemidiaphragm, and more rounded right superior perihilar airspace disease. Please correlate for clinical evidence for aspiration or pneumonia and follow-up to   complete clearing is recommended to exclude underlying pathology      Signer Name: Libby Tyler MD    Signed: 12/8/2022 6:56 AM    Workstation Name: [email or web address]      [facility]      CT Head Without Contrast   Final Result      1.  Motion degraded exam.   2.  Within these limits, no acute intracranial abnormality. No significant change compared to CT brain same day.      Signer Name: Paco Mckeon MD    Signed: 12/7/2022 11:18 PM    Workstation Name: [email or web address]     [facility]      CT Head Without Contrast   Final Result   Impression:   1. No clearly acute intracranial pathology demonstrated.      2. Generalized cerebral atrophy and nonspecific periventricular hypodensities which are thought to represent white matter microvascular change.      Signer Name: Andrews Cabrera MD    Signed: 12/7/2022 7:50 AM    Workstation Name: [facility]     [facility]      XR Chest 1 View   Final Result   No acute cardiopulmonary findings.      Signer Name: Orlando Parmar MD    Signed: 12/6/2022 2:04 AM    Workstation Name: [facility]     [facility]      SCANNED - IMAGING   Final Result          Assessment & Plan     Patient Active Problem List   Diagnosis   • Upper GI bleed   • Metabolic encephalopathy   • Hyperammonemia (HCC)   • Chronic liver failure without hepatic coma (HCC)       Assessment:  1. Status post variceal bleed.  No further eating since banding on December 6  2. Cirrhosis secondary to alcohol.  Elevated alpha-fetoprotein with nodular liver with liver biopsy nondiagnostic.  3. Possibly concerning for hepatocellular carcinoma  4. Ascites.  Stable.  5. Thrombocytopenia.  Stable.  6. Anemia.  Stable.  7. Hepatic encephalopathy on lactulose.      Plan:  · Agree with palliative care and hospice has been called.  · Continue lactulose and Xifaxan.  · We will be available as needed.  I discussed the patients findings and my recommendations with patient and consulting provider.    MD Chaparro Littlejohn M.D.  [facility] [address]  [address] [place] [address]  Office: [phone]

## 2022-12-18 NOTE — PROGRESS NOTES
"SERVICE: [facility] HOSPITALIST    CONSULTANTS: Gastroenterology    CHIEF COMPLAINT: Follow-up metabolic encephalopathy and liver cirrhosis with variceal bleeding    SUBJECTIVE: Patient seen and examined at bedside.  Patient is somnolent. Therefore ros not obtained. Nursing reports no acute issues.          OBJECTIVE:    Physical exam is largely unchanged from previous exam, except where documented below, examination is accurate as of 12/18/2022    Physical Exam:  General: Patient is somnolent. obese elderly male. No acute distress noted.   HENT: Head is atraumatic, normocephalic. Hearing is grossly intact. Nose is without obvious congestion and appears patent. Neck is supple and trachea is midline.   Eyes: Vision is grossly intact. Pupils appear equal and round.   Cardiovascular: Heart has regular rate and rhythm with no murmurs, rubs or gallops noted.   Respiratory: Lungs are clear to ausculation without wheezes, rhonchi or rales.   Abdominal/GI: Soft, nontender, bowel sounds present. No rebound or guarding present.   Extremities: No peripheral edema noted.   Musculoskeletal: Spontaneous movement of bilateral upper and lower extremities against gravity noted. No signs of injury or deformity noted.   Skin: Warm and dry.   Psych: somnolent, Cooperative with exam.   Neuro: No facial asymmetry noted. No focal deficits noted, hearing and vision are grossly intact.     /83 (BP Location: Left arm, Patient Position: Lying)   Pulse 92   Temp 96.1 °F (35.6 °C) (Axillary)   Resp 20   Ht 185.4 cm (73\")   Wt 103 kg (227 lb 11.8 oz)   SpO2 90%   BMI 30.05 kg/m²     MEDS/LABS REVIEWED AND ORDERED    furosemide, 40 mg, Intravenous, BID  lactobacillus acidophilus, 1 capsule, Nasogastric, Daily  lactulose, 20 g, Nasogastric, Q6H  pantoprazole, 40 mg, Intravenous, Q AM  rifAXIMin, 600 mg, Oral, Q12H  sodium chloride, 10 mL, Intravenous, Q12H  sodium chloride, 10 mL, Intravenous, Q12H  sodium chloride, " 10 mL, Intravenous, Q12H          LAB/DIAGNOSTICS:    Lab Results (last 24 hours)     Procedure Component Value Units Date/Time    POC Glucose Once [[id number]]  (Abnormal) Collected: 12/17/22 1730    Specimen: Blood Updated: 12/17/22 1737     Glucose 203 mg/dL      Comment: Meter: [id number] : [id number] Alisia Steven CNA       POC Glucose Once [[id number]]  (Abnormal) Collected: 12/17/22 1149    Specimen: Blood Updated: 12/17/22 1155     Glucose 187 mg/dL      Comment: Meter: [id number] : [id number] Alisia Steven CNA       POC Glucose Once [[id number]]  (Abnormal) Collected: 12/17/22 0802    Specimen: Blood Updated: 12/17/22 0808     Glucose 237 mg/dL      Comment: Meter: [id number] : [id number] Alisia Steven CNA       Body Fluid Culture - Body Fluid, Peritoneum [[id number]] Collected: 12/13/22 1104    Specimen: Body Fluid from Peritoneum Updated: 12/17/22 0755     Body Fluid Culture No growth at 4 days     Gram Stain Occasional WBCs seen      No organisms seen        ECG 12 Lead Rhythm Change   Final Result   HEART RATE= 118  bpm   RR Interval= 508  ms   CA Interval= 151  ms   P Horizontal Axis= 50  deg   P Front Axis= 35  deg   QRSD Interval= 93  ms   QT Interval= 305  ms   QRS Axis= -21  deg   T Wave Axis= 17  deg   - ABNORMAL ECG -   Sinus tachycardia   Ventricular premature complex   Borderline left axis deviation   Non-specific STT wave change   Inferior infarct, old   Fusion complex   No change from prior tracing   Electronically Signed By: Nicola Hernández ([facility]) 10-Dec-2022 22:37:28   Date and Time of Study: 2022-12-10 08:17:42      ECG 12 Lead Rhythm Change   Final Result   HEART RATE= 103  bpm   RR Interval= 600  ms   CA Interval= 149  ms   P Horizontal Axis= 13  deg   P Front Axis= 25  deg   QRSD Interval= 76  ms   QT Interval= 354  ms   QRS Axis= -24  deg   T Wave Axis= 51  deg   - ABNORMAL ECG -   Sinus tachycardia   Paired ventricular premature complexes   Inferior infarct, old   now pvcs  and pacs   Electronically Signed By: Deirdre Rg ([facility]) 09-Dec-2022 12:01:05   Date and Time of Study: 2022-12-08 23:59:26      ECG 12 Lead Altered Mental Status   Final Result   HEART RATE= 96  bpm   RR Interval= 620  ms   WV Interval= 155  ms   P Horizontal Axis= 20  deg   P Front Axis= 28  deg   QRSD Interval= 85  ms   QT Interval= 351  ms   QRS Axis= -11  deg   T Wave Axis= 13  deg   - ABNORMAL ECG -   Sinus rhythm   Inferior infarct, old   NO PRIOR TRACING AVAILABLE FOR COMPARISON   Electronically Signed By: Eugene Polo ([facility]) 06-Dec-2022 07:38:49   Date and Time of Study: 2022-12-06 00:34:09          No radiology results for the last day      ASSESSMENT/PLAN:  Please not portions of this assessment/plan may have been copied and pasted, but I have personally seen this patient and reviewed each line of this assessment and plan for accuracy and made updates to reflect my necessary changes on 12/18/2022         Liver cirrhosis/esophageal and gastric variceal bleeding status post banding, shock liver  -GI continues to follow.  He has completed therapy with octreotide and Protonix, therefore drips have been discontinued.   -Despite improvement in ammonia level he remains with encephalopathy likely due to his severe cirrhosis. Palliative suggested by GI. Wife agreeable. Hospice consulted, awaiting evaluation and transition of care thereafter.     Metabolic encephalopathy  -treat as above    Liver mass-biopsy indeterminate but per GI likely has cancer leading to sequela of liver issues as above.     Suspected aspiration pneumonia-stable on room air, has completed Augmentin course    Hypernatremia- continue free water as above    Thrombocytopenia-platelet count remains low but stable no need of transfusion at this time.  Continue to monitor.     PLAN FOR DISPOSITION: await [facility] evaluation     Lionel Mota DO  Hospitalist, [facility]  12/18/22  06:48 EST    At [facility], we believe  "that sharing information builds trust and better relationships. You are receiving this note because you recently visited [facility]. It is possible you will see health information before a provider has talked with you about it. This kind of information can be easy to misunderstand. To help you fully understand what it means for your health, we urge you to discuss this note with your provider.    \"Dictated utilizing Dragon dictation\"    "

## 2022-12-18 NOTE — NURSING NOTE
In checking orders, noticed that fibersource at 80cc and flush amount noted to be 400cc q 2hr.  The patients abd is distended and taut.  Call made to MD.  570 residual pulled from pt.  MD stated to replace 200cc hold feeding for 1 hr.  After 1 hr, reduce feeding to 40cc and water flush 400cc q 8hrs.

## 2022-12-18 NOTE — NURSING NOTE
Md notified of residual residual of 130 ml at a feed of 40 ml/hr. 100 ml returned and tube feeding shut off for one hour, then restart at current rate per md. Family updated.   .

## 2022-12-18 NOTE — PLAN OF CARE
Goal Outcome Evaluation:  Plan of Care Reviewed With: spouse, daughter        Progress: declining  Outcome Evaluation: Pt arouses to voice but drifts back of to sleep, Tube feeding at 40 ml/hr, abdomen round/distende. soft n/t, multiple lose stools, bottom excoriated, protective ointment applied, f/c to bsd woth dark yellow concentrated urine in bag, cath care provided. pt rpositioned frequent and pillows utilized for comfort. Family at bedside.

## 2022-12-19 NOTE — THERAPY DISCHARGE NOTE
Acute Care - Speech Language Pathology Other-/Discharge   Opal Mac     Patient Name: Lionel Whitehead  : 1945  MRN: [id number]  Today's Date: 2022               Admit Date: 2022     Visit Dx:    ICD-10-CM ICD-9-CM   1. Upper GI bleed  K92.2 578.9     Patient Active Problem List   Diagnosis   • Upper GI bleed   • Metabolic encephalopathy   • Hyperammonemia (HCC)   • Chronic liver failure without hepatic coma (HCC)     Past Medical History:   Diagnosis Date   • History of esophageal varices    • Restless leg syndrome      Past Surgical History:   Procedure Laterality Date   • ENDOSCOPY     • ENDOSCOPY W/ BANDING N/A 2022    Procedure: ESOPHAGOGASTRODUODENOSCOPY WITH BANDING;  Surgeon: Son Saleem MD;  Location: [facility];  Service: Gastroenterology;  Laterality: N/A;  varices                     Anticipated Discharge Disposition (SLP): other (see comments) (care and comfort/possible [facility] scatter bed) (22)                       Reason for Discharge: no further expectation of functional progress (22)                   EDUCATION  The patient has been educated in the following areas:   Dysphagia (Swallowing Impairment).           SLP GOALS     Row Name [id number]          (LTG) Patient will demonstrate progress toward functional swallow for    Diet Texture (Demonstrate progress toward functional swallow) soft to chew (whole) textures  -AD soft to chew (whole) textures  -AD     Liquid viscosity (Demonstrate progress toward functional swallow) thin liquids  -AD thin liquids  -AD     [place] (Demonstrate progress towards functional swallow) independently (over 90% accuracy)  -AD independently (over 90% accuracy)  -AD     Time Frame (Demonstrate progress toward functional swallow) 1 week  -AD 1 week  -AD     Barriers (Demonstrate progress toward functional swallow) care and comfort measures  -AD medically complex  -AD     Progress/Outcomes  (Demonstrate progress toward functional swallow) goal no longer appropriate;goal not met  -AD continuing progress toward goal;goal ongoing  -AD     Comment (Demonstrate progress toward functional swallow) -- Tolerating puree at this time with thin liquids. Pocketing of soft to chew, whole, in right lower sulcus.  -AD        (STG) Patient will tolerate trials of    Consistencies Trialed (Tolerate trials) soft to chew (whole) textures;soft to chew (chopped) textures;soft to chew (ground) textures  -AD soft to chew (whole) textures;soft to chew (chopped) textures;soft to chew (ground) textures  -AD     Desired Outcome (Tolerate trials) without signs of distress;with adequate oral prep/transit/clearance  -AD without signs of distress;with adequate oral prep/transit/clearance  -AD     [place] (Tolerate trials) independently (over 90% accuracy)  -AD independently (over 90% accuracy)  -AD     Time Frame (Tolerate trials) 1 week  -AD 1 week  -AD     Progress/Outcomes (Tolerate trials) goal no longer appropriate;goal not met  -AD unable to make needed progress;goal ongoing  -AD     Comment (Tolerate trials) -- Unable to tolerate one bite of soft whole texture of egg salad sandwich. Increased oral prep time and oral transit. Attempted liquid wash spontaneously which did assit with partial oral transit. Oral pocketing in right lower sulcus wtih food. Pt did not clear on his own, but did spit out food upon verbal request per therapist.  -AD           User Key  (r) = Recorded By, (t) = Taken By, (c) = Cosigned By    Initials Name Provider Type    Crissy Vargas MS CCC-SLP Speech and Language Pathologist                     SLP Discharge Summary  Anticipated Discharge Disposition (SLP): other (see comments) (care and comfort/possible [facility] scatter bed)  Reason for Discharge: no further expectation of functional progress  Progress Toward Achieving Short/long Term Goals: unable to tolerate or actively participate in  therapy    Crissy Parrish, MS CCC-SLP  12/19/2022

## 2022-12-19 NOTE — DISCHARGE SUMMARY
"Lionel Whitehead  1945  [id number]    Hospitalists Discharge/Death Summary    Date of Admission: 2022  Date of Discharge:  2022   Time of death: 0728 am    History of Present Illness from [facility] on admit:   \"Lionel Whitehead is a 77 y.o. male who presented to [facility] on 2022 complaining of feeling very weak for last 1 day, he has episode of syncope also, patient also giving a history of having episode of throwing up blood blood cell, he did not have any bowel movements we have not ever noticed any melena.  Patient noted to have hemoglobin on very low side in the range of 6, patient was hypotensive at the time of presentation, patient received IV fluids and now receiving PRBC transfusion with improvement in blood pressure, patient was started on IV Protonix and octreotide drip realizing previous history of variceal bleed required banding, patient has stopped drinking for more than 12 years now.  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  After treatment for all above, oncology recommended a palliative approach to keep patient comfortable, as HCC cannot be ruled out.  After variceal bleed was treated with gastric banding on 2022, the patient declined further and was not able to resume taking anything orally.  He continued to decline daily until finally hospice was consulted and was due to come today to evaluate him at 10:30 AM.  Unfortunately the patient did not survive until this time and  peacefully with wife at bedside at 7:28 AM.    PCP  Patient Care Team:  Niraj" Reymundo Wells MD (Tony) as PCP - General (Internal Medicine)    Consults:   Consults     Date and Time Order Name Status Description    12/16/2022 12:31 AM Hematology & Oncology Inpatient Consult Completed     12/9/2022 11:46 AM Inpatient Neurology Consult General Completed     12/6/2022  4:39 AM Inpatient Gastroenterology Consult Completed           Operations and Procedures Performed:  Procedure(s):  ESOPHAGOGASTRODUODENOSCOPY WITH BANDING     XR Shoulder 2+ View Right    Result Date: 12/10/2022  Narrative: CR Shoulder Comp Min 2 Vws RT INDICATION: Right shoulder pain status post fall 5 days ago. COMPARISON: None available. FINDINGS: AP internal rotation and AP external rotation views of the right shoulder.   No fracture or dislocation.  Mild degenerative change of the acromioclavicular joint. Humeral head is slightly high riding which may represent a rotator cuff tear.  No foreign body.     Impression: 1. No acute fracture or dislocation. 2. Slightly high riding humeral head raising concern for possible rotator cuff tear. This can be further evaluated with a nonemergent noncontrast MRI of the right shoulder on an outpatient basis. 3. Mild AC joint arthrosis. Signer Name: Jersey Cabrera MD  Signed: 12/10/2022 5:11 PM  Workstation Name: [email or web address]  [facility]    CT Head Without Contrast    Result Date: 12/7/2022  Narrative: CT Head WO HISTORY: Mental status change, persistent or worsening;Gastrointestinal hemorrhage, unspecified COMPARISON: CT brain 12/7/2022. TECHNIQUE: CT of the brain without IV contrast. Coronal and sagittal reconstructions were obtained.  Radiation dose reduction techniques included automated exposure control or exposure modulation based on body size. Count of known CT and cardiac nuc med studies performed in previous 12 months: 0. Time of Scan: 11:07 PM FINDINGS: Portions of the study are degraded by patient motion. No acute intracranial hemorrhage, mass effect,  midline shift, or hydrocephalus. Gray-white matter differentiation is within normal limits. Mild patchy hypodensities in the cerebral white matter, nonspecific but likely representing chronic microvascular ischemic changes. Ventricles and cortical sulci are mildly prominent, in keeping with age-related volume loss. Basal cisterns are clear. Calvarium is intact. Visualized paranasal sinuses are clear. Visualized mastoid air cells are clear.     Impression: 1.  Motion degraded exam. 2.  Within these limits, no acute intracranial abnormality. No significant change compared to CT brain same day. Signer Name: Paco Mckeon MD  Signed: 12/7/2022 11:18 PM  Workstation Name: [id number]  [facility]    CT Head Without Contrast    Result Date: 12/7/2022  Narrative: CT Head WO HISTORY: Acute onset of altered mental status TECHNIQUE: Axial unenhanced head CT. Radiation dose reduction techniques included automated exposure control or exposure modulation based on body size. Count of known CT and cardiac nuc med studies performed in previous 12 months: 0. Time of scan: 7:43 AM COMPARISON: None. FINDINGS: The ventricles are generous in size. Cortical sulci are correspondingly prominent. No extraaxial fluid collections are seen. No parenchymal or subarachnoid hemorrhage is present. Periventricular hypodensities are demonstrated which are nonspecific but likely represent white matter microvascular change in a patient of this age. No CT evidence of mass or acute infarct. The mastoid air cells are clear. The visualized paranasal sinuses are clear. Orbital structures demonstrate no acute findings.     Impression: Impression: 1. No clearly acute intracranial pathology demonstrated. 2. Generalized cerebral atrophy and nonspecific periventricular hypodensities which are thought to represent white matter microvascular change. Signer Name: Andrews Cabrera MD  Signed: 12/7/2022 7:50 AM  Workstation Name: [email or web address]   [facility] [place]    CT Abdomen With & Without Contrast    Result Date: 12/13/2022  Narrative: CT Abdomen WO W INDICATION: Elevated alkaline phosphatase gastrointestinal hemorrhage unspecified. TECHNIQUE: CT of the abdomen with and without IV contrast. Coronal and sagittal reconstructions were obtained.  Radiation dose reduction techniques included automated exposure control or exposure modulation based on body size. Count of known CT and cardiac nuc med studies performed in previous 12 months: 0. COMPARISON: None available. FINDINGS: Infiltrate seen in the right lung base with minimal emphysematous change. Respiratory motion artifact present. Moderate nodular morphology of the liver with heterogeneous enhancement present in patient with cirrhosis. Parenchyma is markedly abnormal. No gross arterial enhancement seen. However the vague areas of abnormal low density on portal venous phase could be cirrhoto- mimetic HCC variant. Correlate with alpha-fetoprotein. The posterior branch of the right portal vein is occluded also concerning for underlying malignancy. There is also thrombosis of the main portal vein is nonocclusive. Spleen is enlarged at 14.3 cm. Mild volume ascites present. Gallbladder full of stones. Mild wall thickening of the jejunum seen but this could be underdistention artifact. There is also thrombosis in the portal confluence and SMV. This could account for the changes in the small bowel. GI track not imaged as pelvic CT not requested. There is a catheter within the stomach, coiled in the body. Stomach is not adequately distended to evaluate. Involuntary respiratory motion artifact and streak artifact limiting evaluation. There is a large right renal pelvic 10 mm stone without delay in enhancement of the right kidney and does not likely obstructed. Moderate atherosclerotic plaque seen in the aorta with infrarenal abdominal aortic aneurysm measuring 3.3 x 3.6 cm.     Impression: 1.  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 Splenomegaly present. 2. There is also suggestion of partial thrombosis of the portal confluence and SMV and could be related to the jejunal wall thickening. 3. Cholelithiasis. 4. Nonobstructing right renal calculus. 5. Significant infiltrate right lung base 6. Infrarenal abdominal aortic aneurysm. Recommend yearly surveillance. Signer Name: Cherry Chatterjee MD  Signed: 12/13/2022 1:14 PM  Workstation Name: [facility]  [facility]    MRI Brain Without Contrast    Result Date: 12/9/2022  Narrative: MRI Brain WO INDICATION: Mental status change of uncertain cause. GI hemorrhage. Rule out stroke. Hepatic encephalopathy. TECHNIQUE: MRI of the brain without IV contrast. COMPARISON:  Head CT 12/7/2022. FINDINGS: Study is motion degraded despite use of motion limiting sequences. There is no evidence for a recent infarct. There is mild generalized atrophy and mild white matter signal abnormality. There is no Chiari I malformation. There is no extra-axial fluid collection. There is no gross evidence of abnormal mineral deposition in the basal ganglia (imaging evidence for hepatic encephalopathy). This certainly does not exclude that diagnosis particularly given the limitations of the current study. There is no MRI evidence for intracranial hemorrhage. The major arterial intracranial flow voids are grossly maintained at the base the brain. There is a small amount of fluid or inflammatory change in the right greater left side mastoid air cells and there is mild paranasal sinus mucosal thickening.     Impression: 1. Study severely motion degraded. Allowing for this, there is no evidence for acute infarct. There is generalized atrophy and mild probable sequelae of small vessel disease. Signer Name: Libby Tyler MD  Signed: 12/9/2022 1:46 PM  Workstation Name:  [email or web address]  [facility] [place]    US Abdomen Complete    Result Date: 12/9/2022  Narrative: US Abdomen Complete INDICATION: Abnormal findings on plain film, elevated transaminases;Gastrointestinal hemorrhage, unspecified COMPARISON: None available. FINDINGS: Technically difficult exam due to patient condition and patient body habitus per technologist report. PANCREAS: The pancreas is obscured due to overlying bowel gas. LIVER: Enlarged liver measuring 20.5 cm with coarse echotexture and nodularity. COMMON BILE DUCT: The common duct is normal in size at the zafar hepatis measuring 5 mm. GALLBLADDER: Small layering gallstones with gallbladder wall thickening measuring up to 5.6 mm. KIDNEYS: The right kidney measures 11.5 cm. The left kidney measures 11.8 cm. No renal calculi are identified although evaluation is limited. SPLEEN: The spleen is enlarged measuring 16.5 cm.  OTHER: Infrarenal abdominal aorta measures 3.4 cm in maximal dimension. IVC is not visualized. Small volume ascites..     Impression: 1.  Technically difficult exam due to patient condition and patient body habitus per technologist report. 2.  Enlarged liver with coarse echotexture and nodularity likely reflecting underlying liver disease. 3.  Splenomegaly with small volume ascites. 4.  Cholelithiasis with gallbladder wall thickening. Unable to assess for sonographic Graves's due to patient sedation. 5.  No evidence of hydronephrosis. No definite renal calculi although evaluation is limited. 6.  Infrarenal abdominal aortic aneurysm measuring up to 3.4 cm. Signer Name: Paco Mckeon MD  Signed: 12/9/2022 5:11 PM  Workstation Name: [id number]  [facility] [place]    US Guided Liver Biopsy    Result Date: 12/14/2022  Narrative: EXAMINATION: Ultrasound-guided liver biopsy, 12/14/2022  HISTORY: 77-year-old male hospitalized with abnormal liver function testing, liver disease and ascites. CT of abdomen demonstrated a cirrhotic  liver with innumerable hypodense liver lesions suspicious for hepatocellular carcinoma. Ultrasound guided liver biopsy was requested for diagnostic purposes.  CONSENT: The procedure, risks and alternatives were discussed in detail with the patient's wife and written informed consent was obtained. Timeout was observed in the procedure room for patient identification and procedure site verification, and the procedure site was marked by me. Permanent images were recorded.  STERILE TECHNIQUE: Maximal sterile barrier precautions were utilized during the procedure, including but not limited to: Hand hygiene; use of chlorhexidine aseptic; sterile gloves, sterile drape/towels, sterile ultrasound probe cover and sterile ultrasound gel.  PROCEDURE: Utilizing an anterior approach and ultrasound guidance, 1% buffered lidocaine utilized for local anesthesia was administered subcutaneously and to the level of the liver capsule.  Under concurrent real-time ultrasound visualization, a 17-gauge needle guide was advanced just proximal to an area of diffuse masslike infiltration in the medial left hepatic lobe with permanent ultrasound image documentation. An 18-gauge /10cm BioPince core biopsy needle was utilized and captured a core of the liver within the abnormal area of interest. Permanent ultrasound image demonstrating the 2.2 cm throw to be within the area of interest within the liver. 3 core specimens were obtained. The cores were placed in formalin. Upon removal of the needle guide, a Gelfoam slurry was instilled to embolize the biopsy tract. Hemostasis achieved. A band-aid was applied. The patient's vital signs remained stable throughout the procedure.      Impression: Technically successful ultrasound-guided liver biopsy.  This report was finalized on 12/14/2022 2:16 PM by Dr. Dion Goddard MD.      XR Chest 1 View    Result Date: 12/8/2022  Narrative: CR Chest 1 Vw INDICATION: Increased secretions. Unspecified GI  hemorrhage. COMPARISON:  None available. FINDINGS: Portable AP view(s) of the chest. 2 films Cardiac silhouette size is top normal. There is elevation of the right hemidiaphragm that is new from prior with some right base airspace disease. Atelectasis, aspiration or early pneumonia in differential. There is also some patchy airspace disease in the right superior perihilar region also new from prior. Follow-up to clearing recommended to exclude underlying pathology. There is no pneumothorax or pleural effusion. There is no acute congestive heart failure.     Impression: Airspace disease right lung including the right lung base with some elevation the right hemidiaphragm, and more rounded right superior perihilar airspace disease. Please correlate for clinical evidence for aspiration or pneumonia and follow-up to complete clearing is recommended to exclude underlying pathology Signer Name: Libby Tyler MD  Signed: 12/8/2022 6:56 AM  Workstation Name: [email or web address]  [facility]    XR Chest 1 View    Result Date: 12/6/2022  Narrative: CR Chest 1 Vw INDICATION: Near syncopal episode weakness COMPARISON:  None available. FINDINGS: Portable AP view(s) of the chest.  The heart and mediastinal contours are normal. The lungs are clear. No pneumothorax or pleural effusion.     Impression: No acute cardiopulmonary findings. Signer Name: Orlando Parmar MD  Signed: 12/6/2022 2:04 AM  [email or web address]  [facility]    US Paracentesis    Result Date: 12/13/2022  Narrative: ULTRASOUND-GUIDED PARACENTESIS, 12/13/2022  HISTORY: 77-year-old male hospital inpatient with abnormal liver function testing, liver disease, ascites.  CONSENT: The procedure, risks and alternatives were discussed in detail with the patient, questions were entertained, and written informed consent was obtained. Timeout was observed in the procedure room for patient identification and procedure site verification,  and the procedure site was marked by me. Permanent images were recorded.  PROCEDURE: Preprocedure ultrasound imaging shows small-to-moderate volumes of ascites fluid scattered in multiple pockets in the upper and lower abdomen. The largest single collection in the right lower quadrant was targeted for the procedure.  Using sterile technique, 1% lidocaine local anesthetic and real-time ultrasound guidance, a 5 Mongolian paracentesis catheter was placed into the largest identified portion of ascites fluid in the right lower quadrant, and 4.8 L of straw-colored ascites fluid was removed without apparent complication.      Impression: Technically successful ultrasound-guided paracentesis with removal of 4.8 L ascites fluid.  This report was finalized on 12/13/2022 3:36 PM by Dr. Sb Mak MD.      XR Abdomen KUB    Result Date: 12/14/2022  Narrative: CR Abdomen 1 Vw INDICATION: Feeding tube placement. COMPARISON: 12/11/2022 FINDINGS: AP radiograph(s) of the abdomen. Flexible feeding tube forms a loop in the body of the stomach with the tip directed toward the gastric fundus. Visualized bowel gas pattern is normal.     Impression: Feeding tube loops in the body of the stomach with the tip directed toward the fundus of the stomach. Signer Name: Augie Juan MD  Signed: 12/14/2022 10:04 PM  Workstation Name: [email or web address]  [facility]    XR Abdomen KUB    Result Date: 12/11/2022  Narrative: CR Abdomen 1 Vw INDICATION: Tube placement. COMPARISON: 12/8/2022 FINDINGS: AP radiograph(s) of the abdomen. NG tube has been removed. A feeding tube is looped in the proximal stomach. Visualized bowel gas pattern is normal.     Impression: Feeding tube looped in the proximal stomach. Consider advancing the tube. Signer Name: Augie Juan MD  Signed: 12/11/2022 11:02 PM  Workstation Name: [email or web address]  [facility]    XR Abdomen KUB    Result Date: 12/8/2022  Narrative: XR ABDOMEN  KUB-: 12/8/2022 1:53 PM  INDICATION: NG tube placement  COMPARISON: None available.  FINDINGS: AP radiographs of the abdomen. There is an enteric tube present and the tip is at the level of the mid body stomach. There are calcific densities in the right midabdomen, one of which appears to represent a renal or renal pelvic stone measuring 9 mm. Other smaller calcific densities in the right upper quadrant are nonspecific but suspicious for cholelithiasis. No suspicious calcifications in the pelvis. There is a Yancey catheter present..  The bowel gas pattern is nonobstructive. No acute osseous abnormalities. No radiopaque foreign body. Dextroscoliosis and secondary degenerative change in the lumbar spine.      Impression: 1. Enteric tube tip at the level of the mid body stomach. 2. Probable right renal/pelvic stone and additional findings suspicious for cholelithiasis.  This report was finalized on 12/8/2022 2:21 PM by Dr. Dion Goddard MD.      Allergies:  has No Known Allergies.    Lukas  n/a    Discharge Medications: n/a    Last Lab Results:   Lab Results (most recent)     Procedure Component Value Units Date/Time    POC Glucose Once [[id number]]  (Abnormal) Collected: 12/18/22 2356    Specimen: Blood Updated: 12/19/22 0002     Glucose 135 mg/dL      Comment: Meter: [id number] : [id number] Rajwinder LICEA       POC Glucose Once [[id number]]  (Abnormal) Collected: 12/18/22 1729    Specimen: Blood Updated: 12/18/22 1735     Glucose 165 mg/dL      Comment: Meter: [id number] : [id number] Jose Woodward CNA       Body Fluid Culture - Body Fluid, Peritoneum [[id number]] Collected: 12/13/22 1104    Specimen: Body Fluid from Peritoneum Updated: 12/18/22 0724     Body Fluid Culture No growth at 5 days     Gram Stain Occasional WBCs seen      No organisms seen    Tissue Pathology Exam [[id number]] Collected: 12/14/22 1159    Specimen: Tissue from Liver Updated: 12/16/22 1423     Case Report --     Surgical Pathology  Report                         Case: [id number]                                  Authorizing Provider:  Son Saleem        Collected:           12/14/2022 11:59 AM                                 MD [place]                                                                   Ordering Location:     [facility]   Received:            12/14/2022 12:25 PM                                 ICU                                                                          Pathologist:           Joanne Lin MD                                                    Specimen:    Liver                                                                                       Final Diagnosis --     1. Liver, Ultrasound-Guided Core Biopsy For Lesions:                A. Cirrhosis with associated necrotic hepatic tissue (see Comment).              B. Negative for increased iron by iron special stain.              C. No definitive malignancy identified.    SWM/clm       Comment --     The viable liver tissue is all cirrhotic, with the reticulin failing to highlight any definitive carcinoma. Some of the cores are necrotic, thus an adjacent neoplastic process such as HCC cannot be excluded. This case is reviewed internally with Dr. Allen, who concurs.       Gross Description --     1. Liver.   Received in formalin labeled with the patient's name and designated 'liver needle biopsy' are four tan to grey white variegated core fragments of soft tissue measuring from 0.6 cm up to 2.0 x 0.1 cm in tubal diameter. The tissue is submitted in 1A.    mb/uso/jat/clm       Special Stains --     Utilizing appropriate controls, multiple special stains are performed including Iron, Trichrome and Reticulin.  Iron is negative for increased iron stores.  Trichrome demonstrates diffuse periportal and bridging fibrosis supporting cirrhosis.  A Reticulin stain highlights intact liver plates within the viable areas without definitive  evidence of a neoplastic process.      Comprehensive Metabolic Panel [[id number]]  (Abnormal) Collected: 12/16/22 0550    Specimen: Blood Updated: 12/16/22 0727     Glucose 187 mg/dL      BUN 48 mg/dL      Creatinine 1.00 mg/dL      Sodium 144 mmol/L      Potassium 4.2 mmol/L      Comment: Slight hemolysis detected by analyzer. Results may be affected.        Chloride 113 mmol/L      CO2 19.7 mmol/L      Calcium 8.0 mg/dL      Total Protein 5.3 g/dL      Albumin 2.50 g/dL      ALT (SGPT) 137 U/L      AST (SGOT) 24 U/L      Alkaline Phosphatase 223 U/L      Total Bilirubin 2.6 mg/dL      Globulin 2.8 gm/dL      A/G Ratio 0.9 g/dL      BUN/Creatinine Ratio 48.0     Anion Gap 11.3 mmol/L      eGFR 78.5 mL/min/1.73      Comment: National Kidney Foundation and American Society of Nephrology (ASN) Task Force recommended calculation based on the Chronic Kidney Disease Epidemiology Collaboration (CKD-EPI) equation refit without adjustment for race.       Narrative:      GFR Normal >60  Chronic Kidney Disease <60  Kidney Failure <15    The GFR formula is only valid for adults with stable renal function between ages 18 and 70.    CBC & Differential [[id number]]  (Abnormal) Collected: 12/16/22 0550    Specimen: Blood Updated: 12/16/22 0614    Narrative:      The following orders were created for panel order CBC & Differential.  Procedure                               Abnormality         Status                     ---------                               -----------         ------                     CBC Auto Differential[[id number]]        Abnormal            Final result               Scan Slide[[id number]]                                                                    Please view results for these tests on the individual orders.    CBC Auto Differential [[id number]]  (Abnormal) Collected: 12/16/22 0550    Specimen: Blood Updated: 12/16/22 0614     WBC 12.38 10*3/mm3      RBC 3.29 10*6/mm3      Hemoglobin 10.2 g/dL       Hematocrit 35.9 %      .1 fL      MCH 31.0 pg      MCHC 28.4 g/dL      RDW 18.6 %      RDW-SD 71.5 fl      MPV 14.5 fL      Platelets 60 10*3/mm3      Neutrophil % 84.2 %      Lymphocyte % 5.2 %      Monocyte % 6.5 %      Eosinophil % 1.5 %      Basophil % 0.4 %      Immature Grans % 2.2 %      Neutrophils, Absolute 10.44 10*3/mm3      Lymphocytes, Absolute 0.64 10*3/mm3      Monocytes, Absolute 0.80 10*3/mm3      Eosinophils, Absolute 0.18 10*3/mm3      Basophils, Absolute 0.05 10*3/mm3      Immature Grans, Absolute 0.27 10*3/mm3      nRBC 0.2 /100 WBC     Comprehensive Metabolic Panel [[id number]]  (Abnormal) Collected: 12/15/22 1151    Specimen: Blood Updated: 12/15/22 1228     Glucose 136 mg/dL      BUN 48 mg/dL      Creatinine 1.12 mg/dL      Sodium 150 mmol/L      Potassium 4.0 mmol/L      Chloride 119 mmol/L      CO2 19.7 mmol/L      Calcium 8.3 mg/dL      Total Protein 5.8 g/dL      Albumin 2.80 g/dL      ALT (SGPT) 204 U/L      AST (SGOT) 25 U/L      Alkaline Phosphatase 251 U/L      Total Bilirubin 3.1 mg/dL      Globulin 3.0 gm/dL      A/G Ratio 0.9 g/dL      BUN/Creatinine Ratio 42.9     Anion Gap 11.3 mmol/L      eGFR 68.5 mL/min/1.73      Comment: National Kidney Foundation and American Society of Nephrology (ASN) Task Force recommended calculation based on the Chronic Kidney Disease Epidemiology Collaboration (CKD-EPI) equation refit without adjustment for race.       Narrative:      GFR Normal >60  Chronic Kidney Disease <60  Kidney Failure <15    The GFR formula is only valid for adults with stable renal function between ages 18 and 70.    CBC & Differential [[id number]]  (Abnormal) Collected: 12/15/22 1151    Specimen: Blood Updated: 12/15/22 1214    Narrative:      The following orders were created for panel order CBC & Differential.  Procedure                               Abnormality         Status                     ---------                               -----------         ------                      CBC Auto Differential[[id number]]        Abnormal            Final result               Scan Slide[[id number]]                                                                    Please view results for these tests on the individual orders.    CBC Auto Differential [[id number]]  (Abnormal) Collected: 12/15/22 1151    Specimen: Blood Updated: 12/15/22 1214     WBC 12.81 10*3/mm3      RBC 3.46 10*6/mm3      Hemoglobin 10.4 g/dL      Hematocrit 35.6 %      .9 fL      MCH 30.1 pg      MCHC 29.2 g/dL      RDW 18.3 %      RDW-SD 65.6 fl      MPV 14.2 fL      Platelets 79 10*3/mm3      Neutrophil % 81.4 %      Lymphocyte % 5.9 %      Monocyte % 8.4 %      Eosinophil % 1.4 %      Basophil % 0.1 %      Immature Grans % 2.8 %      Neutrophils, Absolute 10.44 10*3/mm3      Lymphocytes, Absolute 0.75 10*3/mm3      Monocytes, Absolute 1.07 10*3/mm3      Eosinophils, Absolute 0.18 10*3/mm3      Basophils, Absolute 0.01 10*3/mm3      Immature Grans, Absolute 0.36 10*3/mm3     SCANNED - LABS [[id number]] Resulted: 12/05/22     Updated: 12/14/22 1350    Non-gynecologic Cytology [[id number]] Collected: 12/13/22 1104    Specimen: Body Fluid from Abdominal Cavity Updated: 12/14/22 1257     Case Report --     Formerly [facility] Non-Gyn Cytology                           Case: [id number]                                  Authorizing Provider:  Son Saleem        Collected:           12/13/2022 11:04 AM                                 MD [place]                                                                   Ordering Location:     [facility]   Received:            12/13/2022 11:16 AM                                 ICU                                                                          Pathologist:           Hanny Allen MD                                                          Specimen:    Abdominal Cavity, Paracentesis                                                              Final Diagnosis --      1. Ascites, Paracentesis:  A. Negative for malignant cells.  B. Benign mesothelial cells and mixed inflammation.       Gross Description --     1. Abdominal Cavity.  1 bottle received containing 1150 ml of pale yellow fluid. 1 thin prep & cellblock.         Microscopic Description --     Cytologically benign appearing mesothelial cells with mild mixed inflammation.  Screened by:  [email or web address]        Protime-INR [[id number]]  (Abnormal) Collected: 12/14/22 0511    Specimen: Blood Updated: 12/14/22 0611     Protime 20.9 Seconds      INR 1.77    Narrative:      Therapeutic Ranges for INR: 2.0-3.0 (PT 20-30)                              2.5-3.5 (PT 25-34)    Scan Slide [[id number]] Collected: 12/14/22 0511    Specimen: Blood Updated: 12/14/22 0600     Anisocytosis Slight/1+     WBC Morphology Normal     Platelet Morphology Normal    Protein, Body Fluid - Body Fluid, Peritoneum [[id number]] Collected: 12/13/22 1104    Specimen: Body Fluid from Peritoneum Updated: 12/13/22 1629     Protein, Total, Fluid <1.0 g/dL     Narrative:      No Reference Ranges Established.    A serous fluid total fluid (TP) greater than 50 percent of the serum TP suggests the fluid is an exudate.      1. Pleural TP/Serum TP >0.5  2. Pleural LD/Serum LD >0.6  3. Pleural LD >2/3 of the upper limit of normal for serum LDH    This test was developed, it performance characteristics determined and judged suitable for clinical purposes by [facility].  It has not been cleared or approved by the FDA.  The laboratory is regulated under CLIA as qualified to perform high-complexity testing.     Albumin, Fluid - Body Fluid, Peritoneum [[id number]] Collected: 12/13/22 1104    Specimen: Body Fluid from Peritoneum Updated: 12/13/22 1629     Albumin, Fluid <0.20 g/dL     Narrative:      No Reference Ranges Established.    A Serous fluid albumin gradient (serum albumin-fluid) <1.1 g/dL suggests the fluid is an exudate.  Cirrhosis usually results  in an ascites fluid albumin gradient >1.1 g/dL.    This test was developed, its performance characteristics determined and judged suitable for clinical purposes by [facility].  It has not been cleared or approved by the FDA.  The laboratory is regulated under CLIA as qualified to perfom high-complexity testing.      Body Fluid Cell Count With Differential - Body Fluid, Peritoneum [[id number]] Collected: 12/13/22 1104    Specimen: Body Fluid from Peritoneum Updated: 12/13/22 1225    Narrative:      The following orders were created for panel order Body Fluid Cell Count With Differential - Body Fluid, Peritoneum.  Procedure                               Abnormality         Status                     ---------                               -----------         ------                     Body fluid cell count - ...[[id number]]                      Final result               Body fluid differential ...[[id number]]                      Final result                 Please view results for these tests on the individual orders.    Body fluid differential - Body Fluid, Peritoneum [[id number]] Collected: 12/13/22 1104    Specimen: Body Fluid from Peritoneum Updated: 12/13/22 1225     Neutrophils, Fluid 32 %      Lymphocytes, Fluid 68 %     Body fluid cell count - Body Fluid, Peritoneum [[id number]] Collected: 12/13/22 1104    Specimen: Body Fluid from Peritoneum Updated: 12/13/22 1208     Color, Fluid Colorless     Appearance, Fluid Clear     WBC, Fluid 105 /mm3      RBC, Fluid 0 /mm3     Procalcitonin [[id number]]  (Abnormal) Collected: 12/12/22 0513    Specimen: Blood Updated: 12/13/22 1005     Procalcitonin 0.72 ng/mL     Narrative:      As a Marker for Sepsis (Non-Neonates):    1. <0.5 ng/mL represents a low risk of severe sepsis and/or septic shock.  2. >2 ng/mL represents a high risk of severe sepsis and/or septic shock.    As a Marker for Lower Respiratory Tract Infections that require antibiotic  "therapy:    PCT on Admission    Antibiotic Therapy       6-12 Hrs later    >0.5                Strongly Recommended  >0.25 - <0.5        Recommended   0.1 - 0.25          Discouraged              Remeasure/reassess PCT  <0.1                Strongly Discouraged     Remeasure/reassess PCT    As 28 day mortality risk marker: \"Change in Procalcitonin Result\" (>80% or <=80%) if Day 0 (or Day 1) and Day 4 values are available. Refer to http://www.[facility]-pct-calculator.com    Change in PCT <=80%  A decrease of PCT levels below or equal to 80% defines a positive change in PCT test result representing a higher risk for 28-day all-cause mortality of patients diagnosed with severe sepsis for septic shock.    Change in PCT >80%  A decrease of PCT levels of more than 80% defines a negative change in PCT result representing a lower risk for 28-day all-cause mortality of patients diagnosed with severe sepsis or septic shock.       Blood Culture - Blood, Hand, Right [[id number]]  (Normal) Collected: 12/08/22 0205    Specimen: Blood from Hand, Right Updated: 12/13/22 0215     Blood Culture No growth at 5 days    Narrative:      Less than seven (7) mL's of blood was collected.  Insufficient quantity may yield false negative results.    Blood Culture - Blood, Hand, Right [[id number]]  (Normal) Collected: 12/08/22 0205    Specimen: Blood from Hand, Right Updated: 12/13/22 0215     Blood Culture No growth at 5 days    Narrative:      Less than seven (7) mL's of blood was collected.  Insufficient quantity may yield false negative results.    Ammonia [[id number]]  (Abnormal) Collected: 12/12/22 0513    Specimen: Blood Updated: 12/12/22 0545     Ammonia 15 umol/L     Magnesium [[id number]]  (Normal) Collected: 12/12/22 0513    Specimen: Blood Updated: 12/12/22 0541     Magnesium 2.1 mg/dL     Basic Metabolic Panel [[id number]]  (Abnormal) Collected: 12/11/22 2153    Specimen: Blood Updated: 12/11/22 2300     Glucose 144 mg/dL      BUN 53 mg/dL  "     Creatinine 1.21 mg/dL      Sodium 156 mmol/L      Potassium 4.0 mmol/L      Chloride 123 mmol/L      CO2 21.6 mmol/L      Calcium 8.3 mg/dL      BUN/Creatinine Ratio 43.8     Anion Gap 11.4 mmol/L      eGFR 62.4 mL/min/1.73      Comment: National Kidney Foundation and American Society of Nephrology (ASN) Task Force recommended calculation based on the Chronic Kidney Disease Epidemiology Collaboration (CKD-EPI) equation refit without adjustment for race.       Narrative:      GFR Normal >60  Chronic Kidney Disease <60  Kidney Failure <15    The GFR formula is only valid for adults with stable renal function between ages 18 and 70.    Basic Metabolic Panel [[id number]]  (Abnormal) Collected: 12/11/22 0031    Specimen: Blood Updated: 12/11/22 0142     Glucose 157 mg/dL      BUN 60 mg/dL      Creatinine 1.25 mg/dL      Sodium 157 mmol/L      Potassium 4.6 mmol/L      Comment: Slight hemolysis detected by analyzer. Results may be affected.        Chloride 127 mmol/L      CO2 19.1 mmol/L      Calcium 8.1 mg/dL      BUN/Creatinine Ratio 48.0     Anion Gap 10.9 mmol/L      eGFR 60.0 mL/min/1.73      Comment: National Kidney Foundation and American Society of Nephrology (ASN) Task Force recommended calculation based on the Chronic Kidney Disease Epidemiology Collaboration (CKD-EPI) equation refit without adjustment for race.       Narrative:      GFR Normal >60  Chronic Kidney Disease <60  Kidney Failure <15    The GFR formula is only valid for adults with stable renal function between ages 18 and 70.    C-reactive Protein [[id number]]  (Abnormal) Collected: 12/10/22 0543    Specimen: Blood Updated: 12/10/22 1308     C-Reactive Protein 15.16 mg/dL     Procalcitonin [[id number]]  (Abnormal) Collected: 12/10/22 0543    Specimen: Blood Updated: 12/10/22 0638     Procalcitonin 1.88 ng/mL     Narrative:      As a Marker for Sepsis (Non-Neonates):    1. <0.5 ng/mL represents a low risk of severe sepsis and/or septic shock.  2. >2  "ng/mL represents a high risk of severe sepsis and/or septic shock.    As a Marker for Lower Respiratory Tract Infections that require antibiotic therapy:    PCT on Admission    Antibiotic Therapy       6-12 Hrs later    >0.5                Strongly Recommended  >0.25 - <0.5        Recommended   0.1 - 0.25          Discouraged              Remeasure/reassess PCT  <0.1                Strongly Discouraged     Remeasure/reassess PCT    As 28 day mortality risk marker: \"Change in Procalcitonin Result\" (>80% or <=80%) if Day 0 (or Day 1) and Day 4 values are available. Refer to http://www.[facility]-pct-calculator.com    Change in PCT <=80%  A decrease of PCT levels below or equal to 80% defines a positive change in PCT test result representing a higher risk for 28-day all-cause mortality of patients diagnosed with severe sepsis for septic shock.    Change in PCT >80%  A decrease of PCT levels of more than 80% defines a negative change in PCT result representing a lower risk for 28-day all-cause mortality of patients diagnosed with severe sepsis or septic shock.       Hemoglobin & Hematocrit, Blood [[id number]]  (Abnormal) Collected: 12/10/22 0319    Specimen: Blood Updated: 12/10/22 0448     Hemoglobin 7.9 g/dL      Hematocrit 25.5 %     MRSA Screen, PCR (Inpatient) - Swab, Nares [[id number]]  (Normal) Collected: 12/09/22 1326    Specimen: Swab from Nares Updated: 12/09/22 5823     MRSA PCR No MRSA Detected    Narrative:      The negative predictive value of this diagnostic test is high and should only be used to consider de-escalating anti-MRSA therapy. A positive result may indicate colonization with MRSA and must be correlated clinically.    aPTT [[id number]]  (Normal) Collected: 12/09/22 1326    Specimen: Blood Updated: 12/09/22 1349     PTT 26.3 seconds     Narrative:      PTT = The equivalent PTT values for the therapeutic range of heparin levels at 0.1 to 0.7 U/ml are 53 to 110 seconds.      Protime-INR [[id number]]  " (Abnormal) Collected: 12/09/22 1326    Specimen: Blood Updated: 12/09/22 1349     Protime 20.1 Seconds      INR 1.68    Narrative:      Therapeutic Ranges for INR: 2.0-3.0 (PT 20-30)                              2.5-3.5 (PT 25-34)    Urinalysis, Microscopic Only - Straight Cath [[id number]]  (Abnormal) Collected: 12/09/22 1326    Specimen: Urine from Straight Cath Updated: 12/09/22 1346     RBC, UA 6-12 /HPF      WBC, UA 3-5 /HPF      Comment: Urine culture not indicated.        Bacteria, UA None Seen /HPF      Squamous Epithelial Cells, UA 0-2 /HPF      Hyaline Casts, UA None Seen /LPF      Methodology Manual Light Microscopy    Urinalysis With Culture If Indicated - Straight Cath [[id number]]  (Abnormal) Collected: 12/09/22 1326    Specimen: Urine from Straight Cath Updated: 12/09/22 1344     Color, UA Yellow     Appearance, UA Clear     pH, UA 5.5     Specific Gravity, UA 1.020     Glucose, UA Negative     Ketones, UA Negative     Bilirubin, UA Negative     Blood, UA Large (3+)     Protein, UA 30 mg/dL (1+)     Leuk Esterase, UA Trace     Nitrite, UA Negative     Urobilinogen, UA 0.2 E.U./dL    Narrative:      In absence of clinical symptoms, the presence of pyuria, bacteria, and/or nitrites on the urinalysis result does not correlate with infection.    Blood Gas, Arterial - [[id number]]  (Abnormal) Collected: 12/09/22 1105    Specimen: Arterial Blood Updated: 12/09/22 1107     Site Right Brachial     Yunior's Test N/A     pH, Arterial 7.465 pH units      Comment: 83 Value above reference range        pCO2, Arterial 29.0 mm Hg      Comment: 84 Value below reference range        pO2, Arterial 67.5 mm Hg      Comment: 84 Value below reference range        HCO3, Arterial 20.8 mmol/L      Base Excess, Arterial -2.4 mmol/L      Comment: 84 Value below reference range        O2 Saturation, Arterial 93.7 %      Comment: 84 Value below reference range        Hemoglobin, Blood Gas 8.2 g/dL      Comment: 84 Value below  reference range        Temperature 37.0 C      Barometric Pressure for Blood Gas 741 mmHg      Modality RA     Collected by [id number]     Comment: Meter: [id number]     :  [id number]        pCO2, Temperature Corrected 29.0 mm Hg      pH, Temp Corrected 7.465 pH Units      pO2, Temperature Corrected 67.5 mm Hg     Phosphorus [[id number]]  (Normal) Collected: 12/08/22 2354    Specimen: Blood Updated: 12/09/22 0021     Phosphorus 4.2 mg/dL     Magnesium [[id number]]  (Normal) Collected: 12/08/22 2354    Specimen: Blood Updated: 12/09/22 0021     Magnesium 2.0 mg/dL     Hemoglobin & Hematocrit, Blood [[id number]]  (Abnormal) Collected: 12/08/22 1610    Specimen: Blood Updated: 12/08/22 1618     Hemoglobin 8.6 g/dL      Hematocrit 27.3 %     Lactic Acid, Plasma [[id number]]  (Abnormal) Collected: 12/08/22 0617    Specimen: Blood Updated: 12/08/22 0644     Lactate 3.5 mmol/L     Ammonia [[id number]]  (Abnormal) Collected: 12/08/22 0616    Specimen: Blood Updated: 12/08/22 0642     Ammonia 126 umol/L     Renal Function Panel [[id number]]  (Abnormal) Collected: 12/08/22 0017    Specimen: Blood Updated: 12/08/22 0031     Glucose 172 mg/dL      BUN 82 mg/dL      Creatinine 1.93 mg/dL      Sodium 133 mmol/L      Potassium 5.0 mmol/L      Chloride 103 mmol/L      CO2 15.3 mmol/L      Calcium 8.5 mg/dL      Albumin 2.90 g/dL      Phosphorus 5.0 mg/dL      Anion Gap 14.7 mmol/L      BUN/Creatinine Ratio 42.5     eGFR 35.7 mL/min/1.73      Comment: National Kidney Foundation and American Society of Nephrology (ASN) Task Force recommended calculation based on the Chronic Kidney Disease Epidemiology Collaboration (CKD-EPI) equation refit without adjustment for race.       Narrative:      GFR Normal >60  Chronic Kidney Disease <60  Kidney Failure <15    The GFR formula is only valid for adults with stable renal function between ages 18 and 70.    Lactic Acid, Plasma [[id number]]  (Abnormal) Collected: 12/07/22 2341    Specimen:  Blood from Arm, Left Updated: 12/08/22 0009     Lactate 5.4 mmol/L     TSH [[id number]]  (Normal) Collected: 12/07/22 0536    Specimen: Blood from Arm, Left Updated: 12/07/22 2359     TSH 0.277 uIU/mL     Blood Gas, Arterial - [[id number]]  (Abnormal) Collected: 12/07/22 2330    Specimen: Arterial Blood Updated: 12/07/22 2332     Site Left Brachial     Yunior's Test N/A     pH, Arterial 7.386 pH units      pCO2, Arterial 25.4 mm Hg      Comment: 84 Value below reference range        pO2, Arterial 77.6 mm Hg      Comment: 84 Value below reference range        HCO3, Arterial 15.2 mmol/L      Comment: 84 Value below reference range        Base Excess, Arterial -8.6 mmol/L      Comment: 84 Value below reference range        O2 Saturation, Arterial 96.3 %      Hemoglobin, Blood Gas 9.1 g/dL      Comment: 84 Value below reference range        Temperature 37.0 C      Barometric Pressure for Blood Gas 747 mmHg      Modality RA     Collected by [id number]     Comment: Meter: [id number]     :  [id number]        pCO2, Temperature Corrected 25.4 mm Hg      pH, Temp Corrected 7.386 pH Units      pO2, Temperature Corrected 77.6 mm Hg     AFP Tumor Marker [[id number]]  (Abnormal) Collected: 12/07/22 0121    Specimen: Blood Updated: 12/07/22 1740     ALPHA-FETOPROTEIN 650.00 ng/mL     Narrative:      Alpha Fetoprotein Tumor Marker Reference Range:    0.0-8.3 ng/mL    Note: Normal values apply only to males and nonpregnant females. These results are not interpretable for pregnant females.    Results may be falsely decreased if patient taking Biotin.      Urine Culture - Urine, Urine, Clean Catch [[id number]] Collected: 12/06/22 1339    Specimen: Urine, Clean Catch Updated: 12/07/22 1308     Urine Culture 50,000 CFU/mL Mixed [place] Isolated    Narrative:      Specimen contains mixed organisms of questionable pathogenicity suggestive of contamination. If symptoms persist, suggest recollection.  Colonization of the urinary tract  without infection is common. Treatment is discouraged unless the patient is symptomatic, pregnant, or undergoing an invasive urologic procedure.    STAT Lactic Acid, Reflex [[id number]]  (Abnormal) Collected: 12/07/22 0121    Specimen: Blood Updated: 12/07/22 0145     Lactate 4.1 mmol/L     STAT Lactic Acid, Reflex [[id number]]  (Abnormal) Collected: 12/06/22 1857    Specimen: Blood Updated: 12/06/22 1929     Lactate 3.9 mmol/L     Urinalysis, Microscopic Only - Urine, Clean Catch [[id number]]  (Abnormal) Collected: 12/06/22 1339    Specimen: Urine, Clean Catch Updated: 12/06/22 1459     RBC, UA 3-5 /HPF      WBC, UA 21-30 /HPF      Bacteria, UA 2+ /HPF      Squamous Epithelial Cells, UA None Seen /HPF      Hyaline Casts, UA 3-6 /LPF      Methodology Manual Light Microscopy    Urinalysis With Culture If Indicated - Urine, Clean Catch [[id number]]  (Abnormal) Collected: 12/06/22 1339    Specimen: Urine, Clean Catch Updated: 12/06/22 1442     Color, UA Dark Yellow     Appearance, UA Clear     pH, UA 5.5     Specific Gravity, UA 1.020     Glucose, UA Negative     Ketones, UA Negative     Bilirubin, UA Negative     Blood, UA Trace     Protein, UA Trace     Leuk Esterase, UA Moderate (2+)     Nitrite, UA Negative     Urobilinogen, UA 0.2 E.U./dL    Narrative:      In absence of clinical symptoms, the presence of pyuria, bacteria, and/or nitrites on the urinalysis result does not correlate with infection.    COVID-19 and FLU A/B PCR - Swab, Nasopharynx [[id number]]  (Normal) Collected: 12/06/22 0203    Specimen: Swab from Nasopharynx Updated: 12/06/22 0306     COVID19 Not Detected     Influenza A PCR Not Detected     Influenza B PCR Not Detected    Narrative:      Fact sheet for providers: https://www.fda.gov/media/648298/download    Fact sheet for patients: https://www.fda.gov/media/419901/download    Test performed by PCR.    Troponin [[id number]]  (Normal) Collected: 12/06/22 0012    Specimen: Blood Updated: 12/06/22 0107      Troponin T <0.010 ng/mL     Narrative:      Troponin T Reference Range:  <= 0.03 ng/mL-   Negative for AMI  >0.03 ng/mL-     Abnormal for myocardial necrosis.  Clinicians would have to utilize clinical acumen, EKG, Troponin and serial changes to determine if it is an Acute Myocardial Infarction or myocardial injury due to an underlying chronic condition.       Results may be falsely decreased if patient taking Biotin.      Lipase [[id number]]  (Normal) Collected: 12/06/22 0012    Specimen: Blood Updated: 12/06/22 0043     Lipase 34 U/L     aPTT [[id number]]  (Normal) Collected: 12/06/22 0012    Specimen: Blood Updated: 12/06/22 0039     PTT 27.4 seconds     Narrative:      PTT = The equivalent PTT values for the therapeutic range of heparin levels at 0.1 to 0.7 U/ml are 53 to 110 seconds.          Imaging Results (Most Recent)     Procedure Component Value Units Date/Time    XR Abdomen KUB [[id number]] Collected: 12/14/22 2204     Updated: 12/14/22 2206    Narrative:      CR Abdomen 1 Vw    INDICATION:   Feeding tube placement.    COMPARISON:   12/11/2022    FINDINGS:  AP radiograph(s) of the abdomen. Flexible feeding tube forms a loop in the body of the stomach with the tip directed toward the gastric fundus. Visualized bowel gas pattern is normal.      Impression:      Feeding tube loops in the body of the stomach with the tip directed toward the fundus of the stomach.    Signer Name: Augie Juan MD   Signed: 12/14/2022 10:04 PM   Workstation Name: [id number]    [facility]    US Guided Liver Biopsy [[id number]] Collected: 12/14/22 1249     Updated: 12/14/22 1418    Narrative:      EXAMINATION:  Ultrasound-guided liver biopsy, 12/14/2022     HISTORY:   77-year-old male hospitalized with abnormal liver function testing,  liver disease and ascites. CT of abdomen demonstrated a cirrhotic liver  with innumerable hypodense liver lesions suspicious for hepatocellular  carcinoma. Ultrasound  guided liver biopsy was requested for diagnostic  purposes.      CONSENT:   The procedure, risks and alternatives were discussed in detail with the  patient's wife and written informed consent was obtained. Timeout was  observed in the procedure room for patient identification and procedure  site verification, and the procedure site was marked by me. Permanent  images were recorded.     STERILE TECHNIQUE: Maximal sterile barrier precautions were utilized  during the procedure, including but not limited to: Hand hygiene; use of  chlorhexidine aseptic; sterile gloves, sterile drape/towels, sterile  ultrasound probe cover and sterile ultrasound gel.     PROCEDURE:   Utilizing an anterior approach and ultrasound guidance, 1% buffered  lidocaine utilized for local anesthesia was administered subcutaneously  and to the level of the liver capsule.     Under concurrent real-time ultrasound visualization, a 17-gauge needle  guide was advanced just proximal to an area of diffuse masslike  infiltration in the medial left hepatic lobe with permanent ultrasound  image documentation. An 18-gauge /10cm BioPince core biopsy needle was  utilized and captured a core of the liver within the abnormal area of  interest. Permanent ultrasound image demonstrating the 2.2 cm throw to  be within the area of interest within the liver. 3 core specimens were  obtained. The cores were placed in formalin. Upon removal of the needle  guide, a Gelfoam slurry was instilled to embolize the biopsy tract.   Hemostasis achieved. A band-aid was applied. The patient's vital signs  remained stable throughout the procedure.       Impression:      Technically successful ultrasound-guided liver biopsy.     This report was finalized on 12/14/2022 2:16 PM by Dr. Dion Goddard MD.        Paracentesis [[id number]] Collected: 12/13/22 1534    Specimen: Body Fluid Updated: 12/13/22 1538    Narrative:      ULTRASOUND-GUIDED PARACENTESIS, 12/13/2022     HISTORY:    77-year-old male hospital inpatient with abnormal liver function  testing, liver disease, ascites.     CONSENT:   The procedure, risks and alternatives were discussed in detail with the  patient, questions were entertained, and written informed consent was  obtained. Timeout was observed in the procedure room for patient  identification and procedure site verification, and the procedure site  was marked by me. Permanent images were recorded.     PROCEDURE:   Preprocedure ultrasound imaging shows small-to-moderate volumes of  ascites fluid scattered in multiple pockets in the upper and lower  abdomen. The largest single collection in the right lower quadrant was  targeted for the procedure.     Using sterile technique, 1% lidocaine local anesthetic and real-time  ultrasound guidance, a 5 Slovenian paracentesis catheter was placed into  the largest identified portion of ascites fluid in the right lower  quadrant, and 4.8 L of straw-colored ascites fluid was removed without  apparent complication.       Impression:      Technically successful ultrasound-guided paracentesis with removal of  4.8 L ascites fluid.     This report was finalized on 12/13/2022 3:36 PM by Dr. Sb Mak MD.       SCANNED - IMAGING [[id number]] Resulted: 12/05/22     Updated: 12/13/22 1322    CT Abdomen With & Without Contrast [[id number]] Collected: 12/13/22 1314     Updated: 12/13/22 1316    Narrative:      CT Abdomen WO W    INDICATION:   Elevated alkaline phosphatase gastrointestinal hemorrhage unspecified.    TECHNIQUE:   CT of the abdomen with and without IV contrast. Coronal and sagittal reconstructions were obtained.  Radiation dose reduction techniques included automated exposure control or exposure modulation based on body size. Count of known CT and cardiac nuc med  studies performed in previous 12 months: 0.     COMPARISON:   None available.    FINDINGS:  Infiltrate seen in the right lung base with minimal emphysematous change.  Respiratory motion artifact present.    Moderate nodular morphology of the liver with heterogeneous enhancement present in patient with cirrhosis. Parenchyma is markedly abnormal. No gross arterial enhancement seen. However the vague areas of abnormal low density on portal venous phase could  be cirrhoto- mimetic HCC variant. Correlate with alpha-fetoprotein. The posterior branch of the right portal vein is occluded also concerning for underlying malignancy. There is also thrombosis of the main portal vein is nonocclusive. Spleen is enlarged  at 14.3 cm.    Mild volume ascites present. Gallbladder full of stones. Mild wall thickening of the jejunum seen but this could be underdistention artifact. There is also thrombosis in the portal confluence and SMV. This could account for the changes in the small  bowel.    GI track not imaged as pelvic CT not requested. There is a catheter within the stomach, coiled in the body. Stomach is not adequately distended to evaluate. Involuntary respiratory motion artifact and streak artifact limiting evaluation.    There is a large right renal pelvic 10 mm stone without delay in enhancement of the right kidney and does not likely obstructed. Moderate atherosclerotic plaque seen in the aorta with infrarenal abdominal aortic aneurysm measuring 3.3 x 3.6 cm.      Impression:        1. 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 Splenomegaly present.  2. There is also suggestion of partial thrombosis of the portal confluence and SMV and could be related to the jejunal wall thickening.  3. Cholelithiasis.  4. Nonobstructing right renal calculus.  5. Significant infiltrate right lung base  6. Infrarenal abdominal aortic aneurysm. Recommend yearly surveillance.    Signer Name: Cherry Chatterjee MD   Signed: 12/13/2022 1:14 PM   Workstation Name: [facility]    [facility]    XR Abdomen KUB [[id number]] Collected: 12/11/22 2302     Updated: 12/11/22 2305    Narrative:      CR Abdomen 1 Vw    INDICATION:   Tube placement.    COMPARISON:   12/8/2022    FINDINGS:  AP radiograph(s) of the abdomen. NG tube has been removed. A feeding tube is looped in the proximal stomach. Visualized bowel gas pattern is normal.      Impression:      Feeding tube looped in the proximal stomach. Consider advancing the tube.    Signer Name: Augie Juan MD   Signed: 12/11/2022 11:02 PM   Workstation Name: [email or web address]    [facility]    XR Shoulder 2+ View Right [[id number]] Collected: 12/10/22 1711     Updated: 12/10/22 1713    Narrative:      CR Shoulder Comp Min 2 Vws RT    INDICATION:   Right shoulder pain status post fall 5 days ago.    COMPARISON:   None available.    FINDINGS:   AP internal rotation and AP external rotation views of the right shoulder.   No fracture or dislocation.  Mild degenerative change of the acromioclavicular joint. Humeral head is slightly high riding which may represent a rotator cuff tear.  No foreign  body.      Impression:        1. No acute fracture or dislocation.  2. Slightly high riding humeral head raising concern for possible rotator cuff tear. This can be further evaluated with a nonemergent noncontrast MRI of the right shoulder on an outpatient basis.  3. Mild AC joint arthrosis.    Signer Name: Jersey Cabrera MD   Signed: 12/10/2022 5:11 PM   Workstation Name: [email or web address]    [facility]    US Abdomen Complete [[id number]] Collected: 12/09/22 1711     Updated: 12/09/22 1713    Narrative:      US Abdomen Complete    INDICATION:   Abnormal findings on plain film, elevated transaminases;Gastrointestinal hemorrhage, unspecified    COMPARISON:   None available.    FINDINGS:    Technically difficult exam due to patient condition and patient body habitus per technologist report.    PANCREAS: The pancreas is obscured  due to overlying bowel gas.    LIVER: Enlarged liver measuring 20.5 cm with coarse echotexture and nodularity.     COMMON BILE DUCT: The common duct is normal in size at the zafar hepatis measuring 5 mm.    GALLBLADDER: Small layering gallstones with gallbladder wall thickening measuring up to 5.6 mm.    KIDNEYS: The right kidney measures 11.5 cm. The left kidney measures 11.8 cm. No renal calculi are identified although evaluation is limited.    SPLEEN: The spleen is enlarged measuring 16.5 cm.      OTHER: Infrarenal abdominal aorta measures 3.4 cm in maximal dimension. IVC is not visualized. Small volume ascites..      Impression:        1.  Technically difficult exam due to patient condition and patient body habitus per technologist report.  2.  Enlarged liver with coarse echotexture and nodularity likely reflecting underlying liver disease.  3.  Splenomegaly with small volume ascites.  4.  Cholelithiasis with gallbladder wall thickening. Unable to assess for sonographic Graves's due to patient sedation.  5.  No evidence of hydronephrosis. No definite renal calculi although evaluation is limited.  6.  Infrarenal abdominal aortic aneurysm measuring up to 3.4 cm.    Signer Name: Paco Mkceon MD   Signed: 12/9/2022 5:11 PM   Workstation Name: [id number]    [facility]    MRI Brain Without Contrast [[id number]] Collected: 12/09/22 1346     Updated: 12/09/22 1349    Narrative:      MRI Brain WO    INDICATION:   Mental status change of uncertain cause. GI hemorrhage. Rule out stroke. Hepatic encephalopathy.    TECHNIQUE:   MRI of the brain without IV contrast.    COMPARISON:    Head CT 12/7/2022.    FINDINGS:  Study is motion degraded despite use of motion limiting sequences. There is no evidence for a recent infarct. There is mild generalized atrophy and mild white matter signal abnormality. There is no Chiari I malformation. There is no extra-axial fluid  collection. There is no gross evidence of  abnormal mineral deposition in the basal ganglia (imaging evidence for hepatic encephalopathy). This certainly does not exclude that diagnosis particularly given the limitations of the current study. There is no  MRI evidence for intracranial hemorrhage. The major arterial intracranial flow voids are grossly maintained at the base the brain. There is a small amount of fluid or inflammatory change in the right greater left side mastoid air cells and there is mild  paranasal sinus mucosal thickening.      Impression:        1. Study severely motion degraded. Allowing for this, there is no evidence for acute infarct. There is generalized atrophy and mild probable sequelae of small vessel disease.    Signer Name: Libby Tyler MD   Signed: 12/9/2022 1:46 PM   Workstation Name: [email or web address]    [facility] [place]    XR Abdomen KUB [[id number]] Collected: 12/08/22 1418     Updated: 12/08/22 1423    Narrative:      XR ABDOMEN KUB-: 12/8/2022 1:53 PM     INDICATION:   NG tube placement     COMPARISON:   None available.     FINDINGS:  AP radiographs of the abdomen. There is an enteric tube present and the  tip is at the level of the mid body stomach. There are calcific  densities in the right midabdomen, one of which appears to represent a  renal or renal pelvic stone measuring 9 mm. Other smaller calcific  densities in the right upper quadrant are nonspecific but suspicious for  cholelithiasis. No suspicious calcifications in the pelvis. There is a  Yancey catheter present..     The bowel gas pattern is nonobstructive. No acute osseous abnormalities.  No radiopaque foreign body. Dextroscoliosis and secondary degenerative  change in the lumbar spine.       Impression:      1. Enteric tube tip at the level of the mid body stomach.  2. Probable right renal/pelvic stone and additional findings suspicious  for cholelithiasis.     This report was finalized on 12/8/2022 2:21 PM by Dr. Dion Goddard MD.       XR Chest 1  View [[id number]] Collected: 12/08/22 0656     Updated: 12/08/22 0658    Narrative:      CR Chest 1 Vw    INDICATION:   Increased secretions. Unspecified GI hemorrhage.     COMPARISON:    None available.    FINDINGS:  Portable AP view(s) of the chest. 2 films    Cardiac silhouette size is top normal.    There is elevation of the right hemidiaphragm that is new from prior with some right base airspace disease. Atelectasis, aspiration or early pneumonia in differential. There is also some patchy airspace disease in the right superior perihilar region also  new from prior. Follow-up to clearing recommended to exclude underlying pathology. There is no pneumothorax or pleural effusion. There is no acute congestive heart failure.      Impression:      Airspace disease right lung including the right lung base with some elevation the right hemidiaphragm, and more rounded right superior perihilar airspace disease. Please correlate for clinical evidence for aspiration or pneumonia and follow-up to  complete clearing is recommended to exclude underlying pathology    Signer Name: Libby Tyler MD   Signed: 12/8/2022 6:56 AM   [facility]: [facility]    [facility]    CT Head Without Contrast [[id number]] Collected: 12/07/22 2318     Updated: 12/07/22 2320    Narrative:      CT Head WO    HISTORY: Mental status change, persistent or worsening;Gastrointestinal hemorrhage, unspecified    COMPARISON: CT brain 12/7/2022.    TECHNIQUE: CT of the brain without IV contrast. Coronal and sagittal reconstructions were obtained.  Radiation dose reduction techniques included automated exposure control or exposure modulation based on body size. Count of known CT and cardiac nuc med  studies performed in previous 12 months: 0.     Time of Scan: 11:07 PM    FINDINGS:  Portions of the study are degraded by patient motion.    No acute intracranial hemorrhage, mass effect, midline shift, or hydrocephalus.     Gray-white  matter differentiation is within normal limits. Mild patchy hypodensities in the cerebral white matter, nonspecific but likely representing chronic microvascular ischemic changes.     Ventricles and cortical sulci are mildly prominent, in keeping with age-related volume loss. Basal cisterns are clear.     Calvarium is intact.     Visualized paranasal sinuses are clear. Visualized mastoid air cells are clear.      Impression:        1.  Motion degraded exam.  2.  Within these limits, no acute intracranial abnormality. No significant change compared to CT brain same day.    Signer Name: Paco Mckeon MD   Signed: 12/7/2022 11:18 PM   Workstation Name: [id number]    [facility] [place]    CT Head Without Contrast [[id number]] Collected: 12/07/22 0750     Updated: 12/07/22 0752    Narrative:      CT Head WO    HISTORY:   Acute onset of altered mental status    TECHNIQUE:   Axial unenhanced head CT. Radiation dose reduction techniques included automated exposure control or exposure modulation based on body size. Count of known CT and cardiac nuc med studies performed in previous 12 months: 0.     Time of scan: 7:43 AM    COMPARISON:   None.    FINDINGS:     The ventricles are generous in size. Cortical sulci are correspondingly prominent. No extraaxial fluid collections are seen.    No parenchymal or subarachnoid hemorrhage is present. Periventricular hypodensities are demonstrated which are nonspecific but likely represent white matter microvascular change in a patient of this age. No CT evidence of mass or acute infarct.    The mastoid air cells are clear. The visualized paranasal sinuses are clear.  Orbital structures demonstrate no acute findings.      Impression:      Impression:  1. No clearly acute intracranial pathology demonstrated.    2. Generalized cerebral atrophy and nonspecific periventricular hypodensities which are thought to represent white matter microvascular change.    Signer Name: Andrews LORENZO  "MD Rick   Signed: 2022 7:50 AM   [facility]: [facility]    [facility]    XR Chest 1 View [[id number]] Collected: 22     Updated: 22    Narrative:      CR Chest 1 Vw    INDICATION:   Near syncopal episode weakness     COMPARISON:    None available.    FINDINGS:  Portable AP view(s) of the chest.  The heart and mediastinal contours are normal. The lungs are clear. No pneumothorax or pleural effusion.      Impression:      No acute cardiopulmonary findings.    Signer Name: Orlando Parmar MD   Signed: 2022 2:04 AM   Workstation Name: [facility]    [facility]        PROCEDURES  Procedure(s):  ESOPHAGOGASTRODUODENOSCOPY WITH BANDING    Condition on Discharge:     Physical Exam at Discharge  Vital Signs  Absent  No chest rise and fall      Physical Exam  Skin:     General: Skin is warm.     Pale/jaundiced/cyanotic with breath and heart sounds absent    Discharge Diet:  n/a     Анна Kirby, APRN  22  10:45 EST    Time: Discharge 30 min (if over 30 minutes give explanation as to why it took greater than 30 minutes)    \"Dictated utilizing Dragon dictation\"      "

## 2022-12-19 NOTE — NURSING NOTE
RN went to give scheduled ativan, pt moaning so IV pain med given also.  Pt still has labored breathing.  RN went to place pulse ox on d/t IV pain medicine protocol.  Vital signs obtained showing a decline.  MD and  called.  Wife called and is on her way.

## 2022-12-19 NOTE — CASE MANAGEMENT/SOCIAL WORK
Continued Stay Note  BOUBACAR Dawson     Patient Name: Lionel Whitehead  MRN: [id number]  Today's Date: 12/19/2022    Admit Date: 12/5/2022    Plan: from home w wife, possible STR ?   Discharge Plan     Row Name 12/19/22 0903       Plan    Plan Comments Call received from Noreen ROMAN that patient has passed and could CM notify [facility] such. [facility] and informed her of such and she states she will let the schedulers know.               Discharge Codes    No documentation.                     Nat Alonzo, ABEL

## 2022-12-19 NOTE — PROGRESS NOTES
Called to bedside by staff due to bradycardia and change in breathing.  Ativan, Robinul, and Morphine started earlier today as [facility]  While I was assessing Mr. Thapa, the  called and spoke to his wife.  Mrs. Nguyễn reports that Mr. Nguyễn wished to not be resuscitated or placed on mechanical ventilation.  This was confirmed by me as Mrs. Nguyễn was placed on speaker.  Will change him to DNR/DNI.  She is on her way to see him.  Prognosis is hours to days and he is likely a candidate for a scatter bed level of care.  I confirmed the order changes w/ staff.

## (undated) DEVICE — GLV SURG SENSICARE PI MIC PF SZ7.5 LF STRL

## (undated) DEVICE — THE BITE BLOCK MAXI, LATEX FREE STRAP IS USED TO PROTECT THE ENDOSCOPE INSERTION TUBE FROM BEING BITTEN BY THE PATIENT.

## (undated) DEVICE — KT ORCA ORCAPOD DISP STRL

## (undated) DEVICE — ADAPT CLN BIOGUARD AIR/H2O DISP

## (undated) DEVICE — JACKT LAB F/R KNIT CUFF/COLR XLG BLU

## (undated) DEVICE — SAFELINER SUCTION CANISTER 1000CC: Brand: DEROYAL

## (undated) DEVICE — BW-412T DISP COMBO CLEANING BRUSH: Brand: SINGLE USE COMBINATION CLEANING BRUSH

## (undated) DEVICE — MULTIPLE BAND LIGATOR: Brand: SPEEDBAND SUPERVIEW SUPER 7

## (undated) DEVICE — Device

## (undated) DEVICE — SYR LL 3CC